# Patient Record
Sex: MALE | Race: WHITE | NOT HISPANIC OR LATINO | Employment: OTHER | URBAN - METROPOLITAN AREA
[De-identification: names, ages, dates, MRNs, and addresses within clinical notes are randomized per-mention and may not be internally consistent; named-entity substitution may affect disease eponyms.]

---

## 2018-04-13 ENCOUNTER — HOSPITAL ENCOUNTER (INPATIENT)
Facility: HOSPITAL | Age: 63
LOS: 4 days | Discharge: HOME | End: 2018-04-17
Attending: EMERGENCY MEDICINE | Admitting: INTERNAL MEDICINE

## 2018-04-13 ENCOUNTER — APPOINTMENT (OUTPATIENT)
Dept: CT IMAGING | Facility: HOSPITAL | Age: 63
End: 2018-04-13

## 2018-04-13 ENCOUNTER — APPOINTMENT (OUTPATIENT)
Dept: MRI IMAGING | Facility: HOSPITAL | Age: 63
End: 2018-04-13

## 2018-04-13 ENCOUNTER — APPOINTMENT (OUTPATIENT)
Dept: GENERAL RADIOLOGY | Facility: HOSPITAL | Age: 63
End: 2018-04-13

## 2018-04-13 ENCOUNTER — APPOINTMENT (OUTPATIENT)
Dept: CARDIOLOGY | Facility: HOSPITAL | Age: 63
End: 2018-04-13
Attending: INTERNAL MEDICINE

## 2018-04-13 DIAGNOSIS — R13.12 OROPHARYNGEAL DYSPHAGIA: ICD-10-CM

## 2018-04-13 DIAGNOSIS — G45.9 TRANSIENT CEREBRAL ISCHEMIA, UNSPECIFIED TYPE: ICD-10-CM

## 2018-04-13 DIAGNOSIS — R07.9 EXERTIONAL CHEST PAIN: Primary | ICD-10-CM

## 2018-04-13 PROBLEM — R73.9 HYPERGLYCEMIA: Status: ACTIVE | Noted: 2018-04-13

## 2018-04-13 PROBLEM — E78.5 HLD (HYPERLIPIDEMIA): Status: ACTIVE | Noted: 2018-04-13

## 2018-04-13 PROBLEM — I16.1 HYPERTENSIVE EMERGENCY: Status: ACTIVE | Noted: 2018-04-13

## 2018-04-13 PROBLEM — I10 ESSENTIAL HYPERTENSION: Status: ACTIVE | Noted: 2018-04-13

## 2018-04-13 LAB
ALBUMIN SERPL-MCNC: 3.7 G/DL (ref 3.5–5.2)
ALBUMIN/GLOB SERPL: 1.3 G/DL
ALP SERPL-CCNC: 155 U/L (ref 39–117)
ALT SERPL W P-5'-P-CCNC: 20 U/L (ref 1–41)
ANION GAP SERPL CALCULATED.3IONS-SCNC: 10.9 MMOL/L
AORTIC DIMENSIONLESS INDEX: 0.7 (DI)
ASCENDING AORTA: 3 CM
AST SERPL-CCNC: 17 U/L (ref 1–40)
BASOPHILS # BLD AUTO: 0.03 10*3/MM3 (ref 0–0.2)
BASOPHILS NFR BLD AUTO: 0.4 % (ref 0–1.5)
BH CV ECHO MEAS - ACS: 2 CM
BH CV ECHO MEAS - AO MEAN PG (FULL): 2 MMHG
BH CV ECHO MEAS - AO MEAN PG: 4 MMHG
BH CV ECHO MEAS - AO ROOT AREA (BSA CORRECTED): 1.6
BH CV ECHO MEAS - AO ROOT AREA: 8.6 CM^2
BH CV ECHO MEAS - AO ROOT DIAM: 3.3 CM
BH CV ECHO MEAS - AO V2 MAX: 125 CM/SEC
BH CV ECHO MEAS - AO V2 MEAN: 90.8 CM/SEC
BH CV ECHO MEAS - AO V2 VTI: 22.3 CM
BH CV ECHO MEAS - ASC AORTA: 3 CM
BH CV ECHO MEAS - AVA(I,A): 2.3 CM^2
BH CV ECHO MEAS - AVA(I,D): 2.3 CM^2
BH CV ECHO MEAS - BSA(HAYCOCK): 2.2 M^2
BH CV ECHO MEAS - BSA: 2.1 M^2
BH CV ECHO MEAS - BZI_BMI: 33.8 KILOGRAMS/M^2
BH CV ECHO MEAS - BZI_METRIC_HEIGHT: 170.2 CM
BH CV ECHO MEAS - BZI_METRIC_WEIGHT: 98 KG
BH CV ECHO MEAS - CONTRAST EF (2CH): 55.9 ML/M^2
BH CV ECHO MEAS - CONTRAST EF 4CH: 57.1 ML/M^2
BH CV ECHO MEAS - EDV(CUBED): 97.3 ML
BH CV ECHO MEAS - EDV(MOD-SP2): 59 ML
BH CV ECHO MEAS - EDV(MOD-SP4): 70 ML
BH CV ECHO MEAS - EDV(TEICH): 97.3 ML
BH CV ECHO MEAS - EF(CUBED): 69.4 %
BH CV ECHO MEAS - EF(MOD-SP2): 55.9 %
BH CV ECHO MEAS - EF(MOD-SP4): 57.1 %
BH CV ECHO MEAS - EF(TEICH): 61 %
BH CV ECHO MEAS - ESV(CUBED): 29.8 ML
BH CV ECHO MEAS - ESV(MOD-SP2): 26 ML
BH CV ECHO MEAS - ESV(MOD-SP4): 30 ML
BH CV ECHO MEAS - ESV(TEICH): 37.9 ML
BH CV ECHO MEAS - FS: 32.6 %
BH CV ECHO MEAS - IVS/LVPW: 0.92
BH CV ECHO MEAS - IVSD: 1.1 CM
BH CV ECHO MEAS - LAT PEAK E' VEL: 6 CM/SEC
BH CV ECHO MEAS - LV DIASTOLIC VOL/BSA (35-75): 33.5 ML/M^2
BH CV ECHO MEAS - LV MASS(C)D: 192.9 GRAMS
BH CV ECHO MEAS - LV MASS(C)DI: 92.3 GRAMS/M^2
BH CV ECHO MEAS - LV MEAN PG: 2 MMHG
BH CV ECHO MEAS - LV SYSTOLIC VOL/BSA (12-30): 14.4 ML/M^2
BH CV ECHO MEAS - LV V1 MAX: 79 CM/SEC
BH CV ECHO MEAS - LV V1 MEAN: 64.8 CM/SEC
BH CV ECHO MEAS - LV V1 VTI: 14.8 CM
BH CV ECHO MEAS - LVIDD: 4.6 CM
BH CV ECHO MEAS - LVIDS: 3.1 CM
BH CV ECHO MEAS - LVLD AP2: 6.7 CM
BH CV ECHO MEAS - LVLD AP4: 7.6 CM
BH CV ECHO MEAS - LVLS AP2: 5.8 CM
BH CV ECHO MEAS - LVLS AP4: 5.8 CM
BH CV ECHO MEAS - LVOT AREA (M): 3.5 CM^2
BH CV ECHO MEAS - LVOT AREA: 3.5 CM^2
BH CV ECHO MEAS - LVOT DIAM: 2.1 CM
BH CV ECHO MEAS - LVPWD: 1.2 CM
BH CV ECHO MEAS - MED PEAK E' VEL: 6 CM/SEC
BH CV ECHO MEAS - MV A DUR: 0.1 SEC
BH CV ECHO MEAS - MV A MAX VEL: 97.7 CM/SEC
BH CV ECHO MEAS - MV DEC SLOPE: 389 CM/SEC^2
BH CV ECHO MEAS - MV DEC TIME: 0.16 SEC
BH CV ECHO MEAS - MV E MAX VEL: 77.5 CM/SEC
BH CV ECHO MEAS - MV E/A: 0.79
BH CV ECHO MEAS - MV MEAN PG: 2 MMHG
BH CV ECHO MEAS - MV P1/2T MAX VEL: 82.1 CM/SEC
BH CV ECHO MEAS - MV P1/2T: 61.8 MSEC
BH CV ECHO MEAS - MV V2 MEAN: 70.2 CM/SEC
BH CV ECHO MEAS - MV V2 VTI: 27.9 CM
BH CV ECHO MEAS - MVA P1/2T LCG: 2.7 CM^2
BH CV ECHO MEAS - MVA(P1/2T): 3.6 CM^2
BH CV ECHO MEAS - MVA(VTI): 1.8 CM^2
BH CV ECHO MEAS - PA ACC SLOPE: 42.8 CM/SEC^2
BH CV ECHO MEAS - PA ACC TIME: 0.06 SEC
BH CV ECHO MEAS - PA MAX PG (FULL): 3 MMHG
BH CV ECHO MEAS - PA MAX PG: 5.1 MMHG
BH CV ECHO MEAS - PA PR(ACCEL): 50.7 MMHG
BH CV ECHO MEAS - PA V2 MAX: 113 CM/SEC
BH CV ECHO MEAS - PULM A REVS DUR: 0.1 SEC
BH CV ECHO MEAS - PULM A REVS VEL: 32 CM/SEC
BH CV ECHO MEAS - PULM DIAS VEL: 40.7 CM/SEC
BH CV ECHO MEAS - PULM S/D: 1.5
BH CV ECHO MEAS - PULM SYS VEL: 60.2 CM/SEC
BH CV ECHO MEAS - PVA(V,A): 3.4 CM^2
BH CV ECHO MEAS - PVA(V,D): 3.4 CM^2
BH CV ECHO MEAS - QP/QS: 1.6
BH CV ECHO MEAS - RV MAX PG: 2.1 MMHG
BH CV ECHO MEAS - RV MEAN PG: 1 MMHG
BH CV ECHO MEAS - RV V1 MAX: 72.9 CM/SEC
BH CV ECHO MEAS - RV V1 MEAN: 54.3 CM/SEC
BH CV ECHO MEAS - RV V1 VTI: 15.2 CM
BH CV ECHO MEAS - RVOT AREA: 5.3 CM^2
BH CV ECHO MEAS - RVOT DIAM: 2.6 CM
BH CV ECHO MEAS - SI(AO): 91.3 ML/M^2
BH CV ECHO MEAS - SI(CUBED): 32.3 ML/M^2
BH CV ECHO MEAS - SI(LVOT): 24.5 ML/M^2
BH CV ECHO MEAS - SI(MOD-SP2): 15.8 ML/M^2
BH CV ECHO MEAS - SI(MOD-SP4): 19.1 ML/M^2
BH CV ECHO MEAS - SI(TEICH): 28.4 ML/M^2
BH CV ECHO MEAS - SUP REN AO DIAM: 2.25 CM
BH CV ECHO MEAS - SV(AO): 190.7 ML
BH CV ECHO MEAS - SV(CUBED): 67.5 ML
BH CV ECHO MEAS - SV(LVOT): 51.3 ML
BH CV ECHO MEAS - SV(MOD-SP2): 33 ML
BH CV ECHO MEAS - SV(MOD-SP4): 40 ML
BH CV ECHO MEAS - SV(RVOT): 80.7 ML
BH CV ECHO MEAS - SV(TEICH): 59.4 ML
BH CV ECHO MEAS - TAPSE (>1.6): 2.5 CM2
BH CV VAS BP RIGHT ARM: NORMAL MMHG
BH CV XLRA - RV BASE: 3 CM
BH CV XLRA - TDI S': 15.4 CM/SEC
BILIRUB SERPL-MCNC: 0.3 MG/DL (ref 0.1–1.2)
BUN BLD-MCNC: 13 MG/DL (ref 8–23)
BUN/CREAT SERPL: 23.2 (ref 7–25)
CALCIUM SPEC-SCNC: 8.5 MG/DL (ref 8.6–10.5)
CHLORIDE SERPL-SCNC: 101 MMOL/L (ref 98–107)
CO2 SERPL-SCNC: 25.1 MMOL/L (ref 22–29)
CREAT BLD-MCNC: 0.56 MG/DL (ref 0.76–1.27)
DEPRECATED RDW RBC AUTO: 45.4 FL (ref 37–54)
E/E' RATIO: 12
EOSINOPHIL # BLD AUTO: 0.3 10*3/MM3 (ref 0–0.7)
EOSINOPHIL NFR BLD AUTO: 4.4 % (ref 0.3–6.2)
ERYTHROCYTE [DISTWIDTH] IN BLOOD BY AUTOMATED COUNT: 13.6 % (ref 11.5–14.5)
GFR SERPL CREATININE-BSD FRML MDRD: 148 ML/MIN/1.73
GLOBULIN UR ELPH-MCNC: 2.9 GM/DL
GLUCOSE BLD-MCNC: 292 MG/DL (ref 65–99)
GLUCOSE BLDC GLUCOMTR-MCNC: 295 MG/DL (ref 70–130)
HCT VFR BLD AUTO: 50.8 % (ref 40.4–52.2)
HGB BLD-MCNC: 16.6 G/DL (ref 13.7–17.6)
IMM GRANULOCYTES # BLD: 0.03 10*3/MM3 (ref 0–0.03)
IMM GRANULOCYTES NFR BLD: 0.4 % (ref 0–0.5)
LEFT ATRIUM VOLUME INDEX: 17 ML/M2
LV EF 2D ECHO EST: 57 %
LYMPHOCYTES # BLD AUTO: 2.56 10*3/MM3 (ref 0.9–4.8)
LYMPHOCYTES NFR BLD AUTO: 37.9 % (ref 19.6–45.3)
MAXIMAL PREDICTED HEART RATE: 158 BPM
MCH RBC QN AUTO: 30.4 PG (ref 27–32.7)
MCHC RBC AUTO-ENTMCNC: 32.7 G/DL (ref 32.6–36.4)
MCV RBC AUTO: 93 FL (ref 79.8–96.2)
MONOCYTES # BLD AUTO: 0.6 10*3/MM3 (ref 0.2–1.2)
MONOCYTES NFR BLD AUTO: 8.9 % (ref 5–12)
NEUTROPHILS # BLD AUTO: 3.23 10*3/MM3 (ref 1.9–8.1)
NEUTROPHILS NFR BLD AUTO: 48 % (ref 42.7–76)
NT-PROBNP SERPL-MCNC: 31.8 PG/ML (ref 5–900)
PLATELET # BLD AUTO: 149 10*3/MM3 (ref 140–500)
PMV BLD AUTO: 11.6 FL (ref 6–12)
POTASSIUM BLD-SCNC: 3.9 MMOL/L (ref 3.5–5.2)
PROT SERPL-MCNC: 6.6 G/DL (ref 6–8.5)
RBC # BLD AUTO: 5.46 10*6/MM3 (ref 4.6–6)
SODIUM BLD-SCNC: 137 MMOL/L (ref 136–145)
STRESS TARGET HR: 134 BPM
TROPONIN T SERPL-MCNC: <0.01 NG/ML (ref 0–0.03)
TROPONIN T SERPL-MCNC: <0.01 NG/ML (ref 0–0.03)
WBC NRBC COR # BLD: 6.75 10*3/MM3 (ref 4.5–10.7)

## 2018-04-13 PROCEDURE — 93306 TTE W/DOPPLER COMPLETE: CPT

## 2018-04-13 PROCEDURE — 99254 IP/OBS CNSLTJ NEW/EST MOD 60: CPT | Performed by: INTERNAL MEDICINE

## 2018-04-13 PROCEDURE — 83880 ASSAY OF NATRIURETIC PEPTIDE: CPT | Performed by: PHYSICIAN ASSISTANT

## 2018-04-13 PROCEDURE — 85025 COMPLETE CBC W/AUTO DIFF WBC: CPT | Performed by: PHYSICIAN ASSISTANT

## 2018-04-13 PROCEDURE — 93005 ELECTROCARDIOGRAM TRACING: CPT | Performed by: PHYSICIAN ASSISTANT

## 2018-04-13 PROCEDURE — 70551 MRI BRAIN STEM W/O DYE: CPT

## 2018-04-13 PROCEDURE — 82962 GLUCOSE BLOOD TEST: CPT

## 2018-04-13 PROCEDURE — 80053 COMPREHEN METABOLIC PANEL: CPT | Performed by: PHYSICIAN ASSISTANT

## 2018-04-13 PROCEDURE — 84484 ASSAY OF TROPONIN QUANT: CPT | Performed by: PHYSICIAN ASSISTANT

## 2018-04-13 PROCEDURE — 70450 CT HEAD/BRAIN W/O DYE: CPT

## 2018-04-13 PROCEDURE — 99285 EMERGENCY DEPT VISIT HI MDM: CPT

## 2018-04-13 PROCEDURE — 84484 ASSAY OF TROPONIN QUANT: CPT | Performed by: INTERNAL MEDICINE

## 2018-04-13 PROCEDURE — 93306 TTE W/DOPPLER COMPLETE: CPT | Performed by: INTERNAL MEDICINE

## 2018-04-13 PROCEDURE — 71046 X-RAY EXAM CHEST 2 VIEWS: CPT

## 2018-04-13 PROCEDURE — 93010 ELECTROCARDIOGRAM REPORT: CPT | Performed by: INTERNAL MEDICINE

## 2018-04-13 PROCEDURE — 25010000002 LORAZEPAM PER 2 MG: Performed by: INTERNAL MEDICINE

## 2018-04-13 RX ORDER — SODIUM CHLORIDE 0.9 % (FLUSH) 0.9 %
1-10 SYRINGE (ML) INJECTION AS NEEDED
Status: DISCONTINUED | OUTPATIENT
Start: 2018-04-13 | End: 2018-04-17 | Stop reason: HOSPADM

## 2018-04-13 RX ORDER — ASPIRIN 325 MG
325 TABLET ORAL ONCE
Status: COMPLETED | OUTPATIENT
Start: 2018-04-13 | End: 2018-04-13

## 2018-04-13 RX ORDER — HYDROCODONE BITARTRATE AND ACETAMINOPHEN 5; 325 MG/1; MG/1
1 TABLET ORAL EVERY 4 HOURS PRN
Status: DISCONTINUED | OUTPATIENT
Start: 2018-04-13 | End: 2018-04-17 | Stop reason: HOSPADM

## 2018-04-13 RX ORDER — ASPIRIN 325 MG
325 TABLET ORAL DAILY
Status: DISCONTINUED | OUTPATIENT
Start: 2018-04-13 | End: 2018-04-14

## 2018-04-13 RX ORDER — ONDANSETRON 4 MG/1
4 TABLET, ORALLY DISINTEGRATING ORAL EVERY 6 HOURS PRN
Status: DISCONTINUED | OUTPATIENT
Start: 2018-04-13 | End: 2018-04-17 | Stop reason: HOSPADM

## 2018-04-13 RX ORDER — DOCUSATE SODIUM 100 MG/1
100 CAPSULE, LIQUID FILLED ORAL 2 TIMES DAILY PRN
Status: DISCONTINUED | OUTPATIENT
Start: 2018-04-13 | End: 2018-04-17 | Stop reason: HOSPADM

## 2018-04-13 RX ORDER — ONDANSETRON 2 MG/ML
4 INJECTION INTRAMUSCULAR; INTRAVENOUS EVERY 6 HOURS PRN
Status: DISCONTINUED | OUTPATIENT
Start: 2018-04-13 | End: 2018-04-17 | Stop reason: HOSPADM

## 2018-04-13 RX ORDER — LORAZEPAM 2 MG/ML
1 INJECTION INTRAMUSCULAR
Status: COMPLETED | OUTPATIENT
Start: 2018-04-13 | End: 2018-04-13

## 2018-04-13 RX ORDER — ATORVASTATIN CALCIUM 80 MG/1
80 TABLET, FILM COATED ORAL NIGHTLY
Status: DISCONTINUED | OUTPATIENT
Start: 2018-04-13 | End: 2018-04-17 | Stop reason: HOSPADM

## 2018-04-13 RX ORDER — NITROGLYCERIN 0.4 MG/1
0.4 TABLET SUBLINGUAL
Status: DISCONTINUED | OUTPATIENT
Start: 2018-04-13 | End: 2018-04-17 | Stop reason: HOSPADM

## 2018-04-13 RX ORDER — DIPHENHYDRAMINE HCL 25 MG
25 CAPSULE ORAL NIGHTLY PRN
COMMUNITY
End: 2022-10-28 | Stop reason: HOSPADM

## 2018-04-13 RX ORDER — ONDANSETRON 4 MG/1
4 TABLET, FILM COATED ORAL EVERY 6 HOURS PRN
Status: DISCONTINUED | OUTPATIENT
Start: 2018-04-13 | End: 2018-04-17 | Stop reason: HOSPADM

## 2018-04-13 RX ORDER — LABETALOL HYDROCHLORIDE 5 MG/ML
20 INJECTION, SOLUTION INTRAVENOUS EVERY 6 HOURS PRN
Status: DISCONTINUED | OUTPATIENT
Start: 2018-04-13 | End: 2018-04-14

## 2018-04-13 RX ORDER — ASPIRIN 300 MG/1
300 SUPPOSITORY RECTAL DAILY
Status: DISCONTINUED | OUTPATIENT
Start: 2018-04-13 | End: 2018-04-14

## 2018-04-13 RX ORDER — ACETAMINOPHEN 325 MG/1
650 TABLET ORAL EVERY 4 HOURS PRN
Status: DISCONTINUED | OUTPATIENT
Start: 2018-04-13 | End: 2018-04-17 | Stop reason: HOSPADM

## 2018-04-13 RX ORDER — BISACODYL 10 MG
10 SUPPOSITORY, RECTAL RECTAL DAILY PRN
Status: DISCONTINUED | OUTPATIENT
Start: 2018-04-13 | End: 2018-04-17 | Stop reason: HOSPADM

## 2018-04-13 RX ORDER — DEXTROSE MONOHYDRATE 25 G/50ML
25 INJECTION, SOLUTION INTRAVENOUS
Status: DISCONTINUED | OUTPATIENT
Start: 2018-04-13 | End: 2018-04-17 | Stop reason: HOSPADM

## 2018-04-13 RX ORDER — NICOTINE POLACRILEX 4 MG
15 LOZENGE BUCCAL
Status: DISCONTINUED | OUTPATIENT
Start: 2018-04-13 | End: 2018-04-17 | Stop reason: HOSPADM

## 2018-04-13 RX ORDER — ASPIRIN 325 MG
325 TABLET ORAL DAILY
COMMUNITY
End: 2018-04-17 | Stop reason: HOSPADM

## 2018-04-13 RX ADMIN — LORAZEPAM 1 MG: 2 INJECTION INTRAMUSCULAR; INTRAVENOUS at 16:20

## 2018-04-13 RX ADMIN — LORAZEPAM 1 MG: 2 INJECTION INTRAMUSCULAR; INTRAVENOUS at 17:10

## 2018-04-13 RX ADMIN — ASPIRIN 325 MG: 325 TABLET ORAL at 12:50

## 2018-04-13 RX ADMIN — METOPROLOL TARTRATE 25 MG: 25 TABLET ORAL at 15:30

## 2018-04-13 RX ADMIN — ATORVASTATIN CALCIUM 80 MG: 80 TABLET, FILM COATED ORAL at 23:38

## 2018-04-13 RX ADMIN — METOPROLOL TARTRATE 25 MG: 25 TABLET ORAL at 23:38

## 2018-04-13 NOTE — ED PROVIDER NOTES
"EMERGENCY DEPARTMENT ENCOUNTER    Room Number:  37/37  Date seen:  4/13/2018  Time seen: 10:41 AM  PCP: No Known Provider  Historian: Patient      HPI:  Chief Complaint: Multiple Complaints  Context: Matt Cruz is a 62 y.o. male with h/o CAD for which pt had coronary angioplasty with stent placement about 9 years ago. Pt reports that he has not seen a physician for about 8 years \"because I've had other things going on\"; pt reports, \"I've been told I'm hypertensive, but I haven't taken any medications for years\". Pt reports that for the past several months, pt has had intermittent episodes of lightheadedness and chest pain with exertion. Pt states that last week, pt had an episode of expressive aphasia with right facial numbness and right arm numbness that lasted about 5 minutes and resolved without recurrence. Pt was seen at a new PMD's office earlier today for all of his symptoms; while there, pt's blood pressure was noted to be 220/90. Consequently, pt was referred to the ER for further evaluation. Pt currently denies focal weakness, visual difficulties, headache, palpitations, dyspnea, diaphoresis, abdominal pain, and N/V/D. There are no other complaints at this time.     Onset: Gradual in onset  Location: Generalized body  Radiation: N/A  Duration: Neurological symptoms occurred last week; chest pain/lightheadedness began several months ago  Timing: Intermittent  Character: N/A  Aggravating Factors: Chest pain and lightheadedness worsen with exertion  Alleviating Factors: Chest pain and lightheadedness improve with rest  Severity: Moderate         MEDICAL RECORD REVIEW    Pt has had no recent visits to [facility] ED.           ALLERGIES  Review of patient's allergies indicates no known allergies.    PAST MEDICAL HISTORY  Active Ambulatory Problems     Diagnosis Date Noted   • No Active Ambulatory Problems     Resolved Ambulatory Problems     Diagnosis Date Noted   • No Resolved Ambulatory Problems     Past Medical " History:   Diagnosis Date   • Coronary artery disease    • Hyperlipidemia    • Hypertension    • Kidney stone        PAST SURGICAL HISTORY  Past Surgical History:   Procedure Laterality Date   • CORONARY ANGIOPLASTY WITH STENT PLACEMENT     • KIDNEY STONE SURGERY         FAMILY HISTORY  History reviewed. No pertinent family history.    SOCIAL HISTORY  Social History     Social History   • Marital status:      Spouse name: N/A   • Number of children: N/A   • Years of education: N/A     Occupational History   • Not on file.     Social History Main Topics   • Smoking status: Former Smoker   • Smokeless tobacco: Not on file   • Alcohol use No   • Drug use: No   • Sexual activity: Defer     Other Topics Concern   • Not on file     Social History Narrative   • No narrative on file           REVIEW OF SYSTEMS  Review of Systems   Constitutional: Negative for chills and diaphoresis.   HENT: Negative for congestion, rhinorrhea and sore throat.    Eyes: Negative for pain and visual disturbance.   Respiratory: Negative for cough and shortness of breath.    Cardiovascular: Positive for chest pain. Negative for palpitations and leg swelling.   Gastrointestinal: Negative for abdominal pain, diarrhea, nausea and vomiting.   Genitourinary: Negative for difficulty urinating, dysuria, flank pain and frequency.   Musculoskeletal: Negative for myalgias, neck pain and neck stiffness.   Skin: Negative for rash.   Neurological: Positive for speech difficulty (now resolved), light-headedness and numbness (right face and right arm - now resolved). Negative for weakness and headaches.   Psychiatric/Behavioral: Negative.    All other systems reviewed and are negative.          PHYSICAL EXAM  ED Triage Vitals [04/13/18 1020]   Temp Heart Rate Resp BP SpO2   98.5 °F (36.9 °C) 76 16 170/87 98 %      Temp src Heart Rate Source Patient Position BP Location FiO2 (%)   Oral -- -- -- --     Physical Exam   Constitutional: He is oriented to  person, place, and time. No distress.   HENT:   Head: Normocephalic.   Mouth/Throat: Mucous membranes are normal.   Eyes: EOM are normal. Pupils are equal, round, and reactive to light.   Neck: Normal range of motion. Neck supple.   Cardiovascular: Normal rate, regular rhythm and normal heart sounds.    Pulmonary/Chest: Effort normal and breath sounds normal. No respiratory distress. He has no decreased breath sounds. He has no wheezes. He has no rhonchi. He has no rales.   Abdominal:   Soft  Nontender  Nondistended     Musculoskeletal: Normal range of motion.   Neurological: He is alert and oriented to person, place, and time. He has normal sensation.   GCS is 15  Cranial nerves II-XII are intact.  No facial droop. Uvula is midline. No tongue deviation. PERRL and EOMI. There is no dysarthria, aphasia, or slurred speech.  Sensation is intact to light touch bilaterally and is symmetrical in the face, BUEs, and BLEs.  Strength is 5/5 and symmetrical in the BUEs and BLEs.  Finger to nose, heel to shin, and rapid alternating movements are intact.     Skin: Skin is warm and dry.   There are widespread psoriatic plaques to the extremities.    Psychiatric: Mood and affect normal.   Nursing note and vitals reviewed.          LAB RESULTS  Recent Results (from the past 24 hour(s))   Comprehensive Metabolic Panel    Collection Time: 04/13/18 11:22 AM   Result Value Ref Range    Glucose 292 (H) 65 - 99 mg/dL    BUN 13 8 - 23 mg/dL    Creatinine 0.56 (L) 0.76 - 1.27 mg/dL    Sodium 137 136 - 145 mmol/L    Potassium 3.9 3.5 - 5.2 mmol/L    Chloride 101 98 - 107 mmol/L    CO2 25.1 22.0 - 29.0 mmol/L    Calcium 8.5 (L) 8.6 - 10.5 mg/dL    Total Protein 6.6 6.0 - 8.5 g/dL    Albumin 3.70 3.50 - 5.20 g/dL    ALT (SGPT) 20 1 - 41 U/L    AST (SGOT) 17 1 - 40 U/L    Alkaline Phosphatase 155 (H) 39 - 117 U/L    Total Bilirubin 0.3 0.1 - 1.2 mg/dL    eGFR Non African Amer 148 >60 mL/min/1.73    Globulin 2.9 gm/dL    A/G Ratio 1.3 g/dL     BUN/Creatinine Ratio 23.2 7.0 - 25.0    Anion Gap 10.9 mmol/L   Troponin    Collection Time: 04/13/18 11:22 AM   Result Value Ref Range    Troponin T <0.010 0.000 - 0.030 ng/mL   CBC Auto Differential    Collection Time: 04/13/18 11:22 AM   Result Value Ref Range    WBC 6.75 4.50 - 10.70 10*3/mm3    RBC 5.46 4.60 - 6.00 10*6/mm3    Hemoglobin 16.6 13.7 - 17.6 g/dL    Hematocrit 50.8 40.4 - 52.2 %    MCV 93.0 79.8 - 96.2 fL    MCH 30.4 27.0 - 32.7 pg    MCHC 32.7 32.6 - 36.4 g/dL    RDW 13.6 11.5 - 14.5 %    RDW-SD 45.4 37.0 - 54.0 fl    MPV 11.6 6.0 - 12.0 fL    Platelets 149 140 - 500 10*3/mm3    Neutrophil % 48.0 42.7 - 76.0 %    Lymphocyte % 37.9 19.6 - 45.3 %    Monocyte % 8.9 5.0 - 12.0 %    Eosinophil % 4.4 0.3 - 6.2 %    Basophil % 0.4 0.0 - 1.5 %    Immature Grans % 0.4 0.0 - 0.5 %    Neutrophils, Absolute 3.23 1.90 - 8.10 10*3/mm3    Lymphocytes, Absolute 2.56 0.90 - 4.80 10*3/mm3    Monocytes, Absolute 0.60 0.20 - 1.20 10*3/mm3    Eosinophils, Absolute 0.30 0.00 - 0.70 10*3/mm3    Basophils, Absolute 0.03 0.00 - 0.20 10*3/mm3    Immature Grans, Absolute 0.03 0.00 - 0.03 10*3/mm3   BNP    Collection Time: 04/13/18 11:22 AM   Result Value Ref Range    proBNP 31.8 5.0 - 900.0 pg/mL       I ordered the above labs and reviewed the results.        RADIOLOGY  CT Head Without Contrast   Preliminary Result       No evidence for acute intracranial pathology. If there remains clinical   concern for a CT occult infarct, further evaluation could be performed   with MR imaging.       These findings and recommendations were directly discussed with Patsy Shay of the emergency room staff on 04/13/2018 at approximately 12:19   PM.       Radiation dose reduction techniques were utilized, including automated   exposure control and exposure modulation based on body size.          XR Chest 2 View   Final Result    The lungs are somewhat poorly inflated. There is mild bibasilar  atelectasis. There are no acute focal  "infiltrates. There are no pleural  effusions. A few calcified granulomas are noted. The heart is normal in  size.     IMPRESSIONS: No evidence of acute disease within the chest.          I ordered the above noted radiological studies and reviewed the images on the PACS system.  Spoke with Dr. Carnes (radiologist) regarding CT Head scan results.        MEDICATIONS GIVEN IN ER  Medications   aspirin tablet 325 mg (325 mg Oral Given 4/13/18 1250)           EKG  Interpreted by Dr. Georges (ER physician). Please see his documentation for the interpretation.         PROCEDURES  Procedures          PROGRESS AND CONSULTS    Progress Notes:    ED Course     11:06 AM:  Pt is not a candidate for tPA as pt's NIHSS is 0; pt's neurological symptoms occurred last week and have now resolved.     Blood work, EKG, CXR, and CT Head ordered for further evaluation.     12:16 PM:  Reviewed pt's history and workup with Dr. Georges (ER physician).  After a bedside evaluation, Dr. Georges agrees with the plan of care.    12:31 PM:  Pt's blood glucose is 292. Troponin is negative. CXR is negative acute. CT Head is negative acute. Placed call to [facility] for admission for further evaluation and management of pt's symptoms. Decision time to admit: Now.    12:57 PM:  Placed call to the cardiologist to discuss further course of care. BNP ordered for further evaluation.     1:07 PM:  Discussed case with Dr. Aguilar, hospitalist. He will admit pt to a telemetry bed.     1:26 PM:  Case d/w DIPTI Aguilar for [facility]; their group will consult during the admission. Pt will receive ASA in the ER.           Disposition vitals:  /94   Pulse 71   Temp 98.5 °F (36.9 °C) (Oral)   Resp 20   Ht 170.2 cm (67\")   Wt 97.5 kg (215 lb)   SpO2 95%   BMI 33.67 kg/m²           DIAGNOSIS  Final diagnoses:   Exertional chest pain   Transient cerebral ischemia, unspecified type           DISPOSITION  Pt admitted to " telemetry.      ADMISSION    Discussed treatment plan and reason for admission with admitting physician.              Documentation assistance provided by mee Brown for Ms. Patsy Shay PA-C.  Information recorded by the scribsonya was done at my direction and has been verified and validated by me.              Shellie Brown  04/13/18 [id number]       JANNIE Sin  04/15/18 6699

## 2018-04-13 NOTE — CONSULTS
Patient Name: Matt Cruz  :1955  62 y.o.    Date of Admission: 2018  Encounter Provider: Ashley Alba MD  Date of Encounter Visit: 18  Place of Service: [facility] CARDIOLOGY  Referring Provider: Jaden Aguilar MD  Patient Care Team:  No Known Provider as PCP - General      Chief complaint: HTN and recent stroke-like symptoms    Reason for consult: hypertension    History of Present Illness:  Mr. Cruz is 62 years old, with history of CAD s/p stent to mid LAD in . He was sent to ED from PCP's office for possible stroke symptoms last week. These included expressive aphasia, right facial numbness and right arm numbness; these resolved after 5 minutes. Today, in PCP's office, /90; he's had no further recurrence of stroke like symptoms. Upon arrival to ED today, /87 with HR 76; EKG shows NSR. CT head was negative for acute disease. We have been asked to see for hypertension and chest pain. Labs: troponin <0.01, proBNP 31.8, BUN 13, Crea 0.56, WBC 6.75, Hgb 16.6, Hct 50.8. plt 149.    Comes he comes to the emergency room because he started having symptoms of expressive aphasia and right facial numbness.  He is being evaluated for stroke.  He was found to be markedly height her intensive at his primary care provider's office and was sent to the emergency room.  He has a history of hypertension which has not been well-controlled.  He does not exercise on a regular basis.  When he tries to exert himself he started getting discomfort in his chest which he describes as a pressure that does not radiate.  It's associated with shortness of breath.  It feels just like the pain he had prior to his stents in the past.  He is currently pain-free.    Cardiac catheterization was in  which showed three vessel disease (90% stenosis mid LAD, RCA occlusion, and 30% stenosis of LCX) with EF 50-55%. Angioplasty and KD of the mid LAD was performed, however,  angioplasty was unsuccessful of RCA. Medical management was recommended. He is currently not taking any prescribed medications. He has not followed up with a cardiology for several years.    Cardiac Testing:  Cardiac Catheterization 2010      Imaging:  CXR 18  IMPRESSIONS: No evidence of acute disease within the chest.    CT head 18  IMPRESSION:  No evidence for acute intracranial pathology. If there remains clinical  concern for a CT occult infarct, further evaluation could be performed  with MR imaging.    Past Medical History:   Diagnosis Date   • Coronary artery disease    • Hyperlipidemia    • Hypertension    • Kidney stone        Past Surgical History:   Procedure Laterality Date   • CORONARY ANGIOPLASTY WITH STENT PLACEMENT     • KIDNEY STONE SURGERY           Prior to Admission medications    Medication Sig Start Date End Date Taking? Authorizing Provider   aspirin 325 MG tablet Take 325 mg by mouth Daily.   Yes Historical Provider, MD   diphenhydrAMINE (BENADRYL) 25 mg capsule Take 25 mg by mouth At Night As Needed for Allergies.   Yes Historical Provider, MD       No Known Allergies    Social History     Social History   • Marital status:      Occupational History   • director      Social History Main Topics   • Smoking status: Former Smoker   • Alcohol use No   • Drug use: No   • Sexual activity: Defer     Other Topics Concern   • Not on file       Family History   Problem Relation Age of Onset   • Coronary artery disease Father 60   • Heart attack Brother 48            REVIEW OF SYSTEMS:   All other systems reviewed and negative.        Objective:     Vitals:    18 1118 18 1232 18 1333 18 1503   BP: (!) 185/96 162/94 171/92 (!) 178/103   Pulse: 72 71 72 72   Resp:  18 20   Temp:       TempSrc:       SpO2: 94% 95% 96% 95%   Weight:       Height:         Body mass index is 33.67 kg/m².  No intake or output data in the 24 hours ending 18  1545    Constitutional: He is oriented to person, place, and time. He appears well-developed. He does not appear ill.   HENT:   Head: Normocephalic and atraumatic. Head is without contusion.   Right Ear: Hearing normal. No drainage.   Left Ear: Hearing normal. No drainage.   Nose: No nasal deformity. No epistaxis.   Eyes: Lids are normal. Right eye exhibits no exudate. Left eye exhibits no exudate.  Neck: No JVD present. Carotid bruit is not present. No tracheal deviation present. No thyroid mass and no thyromegaly present.   Cardiovascular: Normal rate, regular rhythm and normal heart sounds.    Pulses:       Posterior tibial pulses are 2+ on the right side, and 2+ on the left side.   Pulmonary/Chest: Effort normal and breath sounds normal.   Abdominal: Soft. Normal appearance and bowel sounds are normal. There is no tenderness.   Musculoskeletal: Normal range of motion.        Right shoulder: He exhibits no deformity.        Left shoulder: He exhibits no deformity.   Neurological: He is alert and oriented to person, place, and time. He has normal strength.   Skin: Skin is warm, dry and intact. No rash noted.   Psychiatric: He has a normal mood and affect. His behavior is normal. Thought content normal.   Vitals reviewed      Lab Review:       Results from last 7 days  Lab Units 04/13/18  1122   SODIUM mmol/L 137   POTASSIUM mmol/L 3.9   CHLORIDE mmol/L 101   CO2 mmol/L 25.1   BUN mg/dL 13   CREATININE mg/dL 0.56*   CALCIUM mg/dL 8.5*   BILIRUBIN mg/dL 0.3   ALK PHOS U/L 155*   ALT (SGPT) U/L 20   AST (SGOT) U/L 17   GLUCOSE mg/dL 292*       Results from last 7 days  Lab Units 04/13/18  1122   TROPONIN T ng/mL <0.010       Results from last 7 days  Lab Units 04/13/18  1122   WBC 10*3/mm3 6.75   HEMOGLOBIN g/dL 16.6   HEMATOCRIT % 50.8   PLATELETS 10*3/mm3 149                EKG:             I personally viewed and interpreted the patient's EKG/Telemetry data.        Assessment and Plan:       1.  Chest pain.  This  is very concerning for anginal chest pain.  I would like to spend the weekend getting this possible stroke evaluated, getting his blood pressure and blood sugars under control with a tentative plan of a heart catheterization on Monday.  2.  Hypertension.  3.  Hyperglycemia.  He denies a formal diagnosis of diabetes.  4.  Strokelike symptoms.  MRI and MRA are pending.    5.  Obesity  6.  Family history of premature coronary disease in his brother who  of a heart attack at a young age.    Ashley Alba MD  18  3:45 PM

## 2018-04-13 NOTE — PROGRESS NOTES
Clinical Pharmacy Services: Medication History    Matt Cruz is a 62 y.o. male presenting to [facility] for Exertional chest pain [R07.9]    He  has a past medical history of Coronary artery disease; Hyperlipidemia; Hypertension; and Kidney stone.    Allergies as of 04/13/2018   • (No Known Allergies)       Medication information was obtained from: Patient at bedside   Pharmacy and Phone Number: [facility] [phone]    Prior to Admission Medications     Prescriptions Last Dose Informant Patient Reported? Taking?    aspirin 325 MG tablet 4/12/2018 Self Yes Yes    Take 325 mg by mouth Daily.    diphenhydrAMINE (BENADRYL) 25 mg capsule Past Week Self Yes Yes    Take 25 mg by mouth At Night As Needed for Allergies.            Medication notes: N/A    This medication list is complete to the best of my knowledge as of 4/13/2018    Please call if questions.    Keesha Foster, Pharm.D. Candidate 2018  [phone]  4/13/2018 2:19 PM

## 2018-04-13 NOTE — ED TRIAGE NOTES
Pt had stent placed 9 years ago without much follow up.    Last week, pt had episode of slurred speech, aphasia, facial numbness, right sided weakness. Symptoms resolved after 5 minutes.    Pt was at PCP today and was hypertensive. Pt sent to ER for hypertension and TIA. No neuro symptoms today.

## 2018-04-13 NOTE — ED PROVIDER NOTES
"The ZAC and I have discussed this patient's history, physical exam, and treatment plan.  I have reviewed the documentation and personally had a face to face interaction with the patient. I affirm the documentation and agree with the treatment and plan.  The attached note describes my personal findings.    HPI:  Mtat Cruz is a 62 y.o. male with h/o CAD for which pt has had coronary angioplasty with stent placement about 9 years ago. Pt reports that he has not seen a physician for about 8 years \"because I've had other things going on\"; pt reports, \"I've been told I'm hypertensive, but I haven't taken any medications for years\". Pt reports that for the past several months, pt has had intermittent episodes of lightheadedness and chest pain with exertion. Pt states that last week, pt had an episode of expressive aphasia that lasted about 5 minutes and has now resolved. Pt was seen at his PMD's office earlier today for all of his symptoms; while there, pt's blood pressure was noted to be 220/90. Consequently, pt was referred to the ER for further evaluation. Pt denies focal weakness/numbness, visual difficulties, headache, palpitations, dyspnea, diaphoresis, abdominal pain, and N/V/D. There are no other complaints at this time. Pt ambulates independently. Pt denies tobacco and     Per pt's spouse, pt's speech seems baseline but pt appears fatigued.     PEx:  On exam, A and O X3, intact distal pulses, heart RRR, lungs CTAB, nml speeach, no pronator dirft, abd is obese soft and non-tender, no motor os sensory changes.  The patient currently denies any chest pain, shortness of breath or any neurologic complaints.    Progress and Consults:  BP- (!) 185/96 HR- 72 Temp- 98.5 °F (36.9 °C) (Oral) O2 sat- 94%  Notified pt that he may of had a TIA. Notified pt of plan to admit to medicine for further assessment. Pt understands and agrees with the plan, all questions answered.    Procedures:  EKG           EKG time: " 1112  Rhythm/Rate: 75  P waves and NJ: narrow complex  QRS, axis: nml   ST and T waves: ST depression in anterior leads 2,3,F, no ST elevation  Nml QT    Interpreted Contemporaneously by me, independently viewed  Changed compared to prior 10/2010.    Documentation assistance provided by mee Rocha for Leo Georges MD.  Information recorded by the scribe was done at my direction and has been verified and validated by me.       Blanca Rocha  04/13/18 1253       Leo Georges MD  04/13/18 9570

## 2018-04-13 NOTE — ED NOTES
Gave the floor report. Pt will go to room 544 % north after completion of MRI      Lupe Cha RN  04/13/18 9668

## 2018-04-13 NOTE — H&P
Name: Matt Cruz ADMIT: 2018   : 1955  PCP: No Known Provider    MRN: [id number] LOS: 0 days   AGE/SEX: 62 y.o. male  ROOM: /     Chief Complaint   Patient presents with   • Hypertension   • Neuro Deficit(s)     last week - denies symptoms today       Subjective   Mr. Cruz is a 62 y.o. male with a history of previous PCI in  and then quit going to follow up 8 years ago who presents to [facility] after having several weeks of progressive worsening chest pressure with exertion. Described as sternal pain. Occasional neck pain. No arm numbness or pain. He reports no associated dyspnea/orthopnea/pnd/edema. Pain is relieved with rest. He also had right sided facial droop and right arm numbness/weakness associated with speaking nonsensical words/slurred speech last week. This resolved after about 5 minutes and has not recurred. He reports that he did not have HA, vision changes, or difficulty swallowing. He reports being able to comprehend others but was not able to form the words he wanted to say. He went to PCP office and was found to have SBP in 220s so was told to report to ER. He has known HTN and has not taken medication for several years. He also reports being told he has high blood glucose but is unsure if diagnosed with diabetes. He has HLD. Nonsmoker. Denies depressed mood. States he just got busy raising family and didn't fit MD visits into his schedule.    History of Present Illness    Past Medical History:   Diagnosis Date   • Coronary artery disease    • Hyperlipidemia    • Hypertension    • Kidney stone      Past Surgical History:   Procedure Laterality Date   • CORONARY ANGIOPLASTY WITH STENT PLACEMENT     • KIDNEY STONE SURGERY       Family History   Problem Relation Age of Onset   • Coronary artery disease Father 60   • Heart attack Brother 48          Social History   Substance Use Topics   • Smoking status: Former Smoker   • Smokeless tobacco: Not on  file   • Alcohol use No       (Not in a hospital admission)  Allergies:  No Known Allergies    Review of Systems   Constitutional: Negative for chills and fever.   HENT: Negative for sore throat and trouble swallowing.    Eyes: Negative for pain and visual disturbance.   Respiratory: Negative for cough, shortness of breath and wheezing.    Cardiovascular: Positive for chest pain. Negative for palpitations and leg swelling.   Gastrointestinal: Negative for constipation, diarrhea, nausea and vomiting.   Endocrine: Negative for cold intolerance and heat intolerance.   Genitourinary: Negative for difficulty urinating and dysuria.   Musculoskeletal: Negative for neck pain and neck stiffness.   Skin: Negative for pallor and rash.   Allergic/Immunologic: Negative for environmental allergies and food allergies.   Neurological: Negative for seizures and syncope.   Hematological: Negative for adenopathy.   Psychiatric/Behavioral: Negative for agitation and confusion.        Objective    Vital Signs  Temp:  [98.5 °F (36.9 °C)] 98.5 °F (36.9 °C)  Heart Rate:  [71-76] 72  Resp:  [16-20] 18  BP: (162-185)/(87-96) 171/92  SpO2:  [94 %-98 %] 96 %  on  Flow (L/min):  [2] 2;   Device (Oxygen Therapy): nasal cannula  Body mass index is 33.67 kg/m².    Physical Exam   Constitutional: He is oriented to person, place, and time. He appears well-developed and well-nourished. No distress.   HENT:   Head: Normocephalic and atraumatic.   Nose: Nose normal.   Eyes: Conjunctivae and EOM are normal. Pupils are equal, round, and reactive to light.   Neck: Normal range of motion. Neck supple.   Cardiovascular: Normal rate, regular rhythm, normal heart sounds and intact distal pulses.    No murmur heard.  Pulmonary/Chest: Effort normal and breath sounds normal. No stridor. He has no wheezes. He has no rales.   Abdominal: Soft. Bowel sounds are normal. He exhibits no distension. There is no tenderness.   Musculoskeletal: Normal range of motion. He  exhibits edema (trace ble).   Neurological: He is alert and oriented to person, place, and time. No cranial nerve deficit. He exhibits normal muscle tone.   Skin: Skin is warm and dry. He is not diaphoretic.   Psychiatric: He has a normal mood and affect. His behavior is normal.   Nursing note and vitals reviewed.      Results Review:   I reviewed the patient's new clinical results. Reviewed imaging, agree with interpretation. Reviewed telemetry/EKG, sinus rhythm, mild st depression II/III/avf. Reviewed prior records.    Lab Results (last 24 hours)     Procedure Component Value Units Date/Time    CBC & Differential [[id number]] Collected:  04/13/18 1122    Specimen:  Blood Updated:  04/13/18 1143    Narrative:       The following orders were created for panel order CBC & Differential.  Procedure                               Abnormality         Status                     ---------                               -----------         ------                     CBC Auto Differential[[id number]]        Normal              Final result                 Please view results for these tests on the individual orders.    Comprehensive Metabolic Panel [[id number]]  (Abnormal) Collected:  04/13/18 1122    Specimen:  Blood Updated:  04/13/18 1158     Glucose 292 (H) mg/dL      BUN 13 mg/dL      Creatinine 0.56 (L) mg/dL      Sodium 137 mmol/L      Potassium 3.9 mmol/L      Chloride 101 mmol/L      CO2 25.1 mmol/L      Calcium 8.5 (L) mg/dL      Total Protein 6.6 g/dL      Albumin 3.70 g/dL      ALT (SGPT) 20 U/L      AST (SGOT) 17 U/L      Comment: Specimen hemolyzed.  Results may be affected.        Alkaline Phosphatase 155 (H) U/L      Total Bilirubin 0.3 mg/dL      eGFR Non African Amer 148 mL/min/1.73      Globulin 2.9 gm/dL      A/G Ratio 1.3 g/dL      BUN/Creatinine Ratio 23.2     Anion Gap 10.9 mmol/L     Troponin [[id number]]  (Normal) Collected:  04/13/18 1122    Specimen:  Blood Updated:  04/13/18 1157     Troponin T <0.010  ng/mL     Narrative:       Troponin T Reference Ranges:  Less than 0.03 ng/mL:    Negative for AMI  0.03 to 0.09 ng/mL:      Indeterminant for AMI  Greater than 0.09 ng/mL: Positive for AMI    CBC Auto Differential [[id number]]  (Normal) Collected:  04/13/18 1122    Specimen:  Blood Updated:  04/13/18 1143     WBC 6.75 10*3/mm3      RBC 5.46 10*6/mm3      Hemoglobin 16.6 g/dL      Hematocrit 50.8 %      MCV 93.0 fL      MCH 30.4 pg      MCHC 32.7 g/dL      RDW 13.6 %      RDW-SD 45.4 fl      MPV 11.6 fL      Platelets 149 10*3/mm3      Neutrophil % 48.0 %      Lymphocyte % 37.9 %      Monocyte % 8.9 %      Eosinophil % 4.4 %      Basophil % 0.4 %      Immature Grans % 0.4 %      Neutrophils, Absolute 3.23 10*3/mm3      Lymphocytes, Absolute 2.56 10*3/mm3      Monocytes, Absolute 0.60 10*3/mm3      Eosinophils, Absolute 0.30 10*3/mm3      Basophils, Absolute 0.03 10*3/mm3      Immature Grans, Absolute 0.03 10*3/mm3     BNP [[id number]]  (Normal) Collected:  04/13/18 1122    Specimen:  Blood Updated:  04/13/18 1319     proBNP 31.8 pg/mL     Narrative:       Among patients with dyspnea, NT-proBNP is highly sensitive for the detection of acute congestive heart failure. In addition NT-proBNP of <300 pg/ml effectively rules out acute congestive heart failure with 99% negative predictive value.          CT Head Without Contrast   Final Result       No evidence for acute intracranial pathology. If there remains clinical   concern for a CT occult infarct, further evaluation could be performed   with MR imaging.       These findings and recommendations were directly discussed with Patsy Shay of the emergency room staff on 04/13/2018 at approximately 12:19   PM.       Radiation dose reduction techniques were utilized, including automated   exposure control and exposure modulation based on body size.       This report was finalized on 4/13/2018 1:13 PM by Dr. Allan Carnes MD.          XR Chest 2 View   Final Result       MRI Brain With & Without Contrast    (Results Pending)   MRI Angiogram Head Without Contrast    (Results Pending)   MRI Angiogram Neck With & Without Contrast    (Results Pending)     Assessment/Plan      Active Hospital Problems (** Indicates Principal Problem)    Diagnosis Date Noted   • **Exertional chest pain [R07.9] 04/13/2018   • Essential hypertension [I10] 04/13/2018   • TIA (transient ischemic attack) [G45.9] 04/13/2018   • Hyperglycemia [R73.9] 04/13/2018   • HLD (hyperlipidemia) [E78.5] 04/13/2018      Resolved Hospital Problems    Diagnosis Date Noted Date Resolved   No resolved problems to display.     - CP: Start ASA, Statin, BBlocker. NTG prn. Hx CAD and multiple risk factors. Trend troponin, check Echo, repeat EKG, monitor on telemetry. Consult Cardiology.  - TIA: Returned to baseline and happened several days ago. Think BP control for CP purposes more urgent. Will obtain TIA workup with MRI/A head/neck. Echo as above. Consult Neurology.  - HTN: Hypertensive emergency possible depending on etiology of above symptoms. His BNP is not elevated and currently is at neurologic baseline. Renal function is normal. Systolic was 220 earlier today. Now improved to 180s. Monitor with Metoprolol for now. PRN labetalol.  - Hyperglycemia: BG>200 c/w diabetes. Check A1c, start SSI. DM educator.  - HLD: Check lipid profile. Statin as above.  - PPx AZAR/SCD  - Full Code    I discussed the patients findings and my recommendations with patient and consulting provider.      Jaden Aguilar MD  [facility]  04/13/18  2:51 PM

## 2018-04-14 ENCOUNTER — APPOINTMENT (OUTPATIENT)
Dept: CT IMAGING | Facility: HOSPITAL | Age: 63
End: 2018-04-14

## 2018-04-14 PROBLEM — E11.69 TYPE 2 DIABETES MELLITUS WITH OTHER SPECIFIED COMPLICATION (HCC): Status: ACTIVE | Noted: 2018-04-13

## 2018-04-14 LAB
ALBUMIN SERPL-MCNC: 3.6 G/DL (ref 3.5–5.2)
ALBUMIN/GLOB SERPL: 1.4 G/DL
ALP SERPL-CCNC: 129 U/L (ref 39–117)
ALT SERPL W P-5'-P-CCNC: 18 U/L (ref 1–41)
ANION GAP SERPL CALCULATED.3IONS-SCNC: 13.9 MMOL/L
ARTICHOKE IGE QN: 134 MG/DL (ref 0–100)
AST SERPL-CCNC: 13 U/L (ref 1–40)
BASOPHILS # BLD AUTO: 0.03 10*3/MM3 (ref 0–0.2)
BASOPHILS NFR BLD AUTO: 0.4 % (ref 0–1.5)
BILIRUB SERPL-MCNC: 0.4 MG/DL (ref 0.1–1.2)
BUN BLD-MCNC: 13 MG/DL (ref 8–23)
BUN/CREAT SERPL: 24.5 (ref 7–25)
CALCIUM SPEC-SCNC: 8.7 MG/DL (ref 8.6–10.5)
CHLORIDE SERPL-SCNC: 101 MMOL/L (ref 98–107)
CHOLEST SERPL-MCNC: 217 MG/DL (ref 0–200)
CO2 SERPL-SCNC: 23.1 MMOL/L (ref 22–29)
CREAT BLD-MCNC: 0.53 MG/DL (ref 0.76–1.27)
DEPRECATED RDW RBC AUTO: 45.9 FL (ref 37–54)
EOSINOPHIL # BLD AUTO: 0.32 10*3/MM3 (ref 0–0.7)
EOSINOPHIL NFR BLD AUTO: 3.9 % (ref 0.3–6.2)
ERYTHROCYTE [DISTWIDTH] IN BLOOD BY AUTOMATED COUNT: 13.7 % (ref 11.5–14.5)
GFR SERPL CREATININE-BSD FRML MDRD: >150 ML/MIN/1.73
GLOBULIN UR ELPH-MCNC: 2.6 GM/DL
GLUCOSE BLD-MCNC: 247 MG/DL (ref 65–99)
GLUCOSE BLDC GLUCOMTR-MCNC: 206 MG/DL (ref 70–130)
GLUCOSE BLDC GLUCOMTR-MCNC: 211 MG/DL (ref 70–130)
GLUCOSE BLDC GLUCOMTR-MCNC: 277 MG/DL (ref 70–130)
GLUCOSE BLDC GLUCOMTR-MCNC: 294 MG/DL (ref 70–130)
HBA1C MFR BLD: 11.3 % (ref 4.8–5.6)
HCT VFR BLD AUTO: 49.1 % (ref 40.4–52.2)
HDLC SERPL-MCNC: 31 MG/DL (ref 40–60)
HGB BLD-MCNC: 16.5 G/DL (ref 13.7–17.6)
IMM GRANULOCYTES # BLD: 0.04 10*3/MM3 (ref 0–0.03)
IMM GRANULOCYTES NFR BLD: 0.5 % (ref 0–0.5)
INR PPP: 0.93 (ref 0.9–1.1)
LDLC SERPL CALC-MCNC: ABNORMAL MG/DL (ref 0–100)
LDLC/HDLC SERPL: ABNORMAL {RATIO}
LYMPHOCYTES # BLD AUTO: 3.17 10*3/MM3 (ref 0.9–4.8)
LYMPHOCYTES NFR BLD AUTO: 38.5 % (ref 19.6–45.3)
MCH RBC QN AUTO: 31 PG (ref 27–32.7)
MCHC RBC AUTO-ENTMCNC: 33.6 G/DL (ref 32.6–36.4)
MCV RBC AUTO: 92.1 FL (ref 79.8–96.2)
MONOCYTES # BLD AUTO: 0.66 10*3/MM3 (ref 0.2–1.2)
MONOCYTES NFR BLD AUTO: 8 % (ref 5–12)
NEUTROPHILS # BLD AUTO: 4.02 10*3/MM3 (ref 1.9–8.1)
NEUTROPHILS NFR BLD AUTO: 48.7 % (ref 42.7–76)
PLATELET # BLD AUTO: 159 10*3/MM3 (ref 140–500)
PMV BLD AUTO: 11.7 FL (ref 6–12)
POTASSIUM BLD-SCNC: 3.7 MMOL/L (ref 3.5–5.2)
PROT SERPL-MCNC: 6.2 G/DL (ref 6–8.5)
PROTHROMBIN TIME: 12.3 SECONDS (ref 11.7–14.2)
RBC # BLD AUTO: 5.33 10*6/MM3 (ref 4.6–6)
SODIUM BLD-SCNC: 138 MMOL/L (ref 136–145)
TRIGL SERPL-MCNC: 430 MG/DL (ref 0–150)
TROPONIN T SERPL-MCNC: <0.01 NG/ML (ref 0–0.03)
TROPONIN T SERPL-MCNC: <0.01 NG/ML (ref 0–0.03)
TSH SERPL DL<=0.05 MIU/L-ACNC: 2.73 MIU/ML (ref 0.27–4.2)
VIT B12 BLD-MCNC: 451 PG/ML (ref 211–946)
VLDLC SERPL-MCNC: ABNORMAL MG/DL (ref 5–40)
WBC NRBC COR # BLD: 8.24 10*3/MM3 (ref 4.5–10.7)

## 2018-04-14 PROCEDURE — 85025 COMPLETE CBC W/AUTO DIFF WBC: CPT | Performed by: INTERNAL MEDICINE

## 2018-04-14 PROCEDURE — 84443 ASSAY THYROID STIM HORMONE: CPT | Performed by: INTERNAL MEDICINE

## 2018-04-14 PROCEDURE — 99233 SBSQ HOSP IP/OBS HIGH 50: CPT | Performed by: INTERNAL MEDICINE

## 2018-04-14 PROCEDURE — 63710000001 INSULIN ASPART PER 5 UNITS: Performed by: INTERNAL MEDICINE

## 2018-04-14 PROCEDURE — 80061 LIPID PANEL: CPT | Performed by: INTERNAL MEDICINE

## 2018-04-14 PROCEDURE — 36415 COLL VENOUS BLD VENIPUNCTURE: CPT | Performed by: INTERNAL MEDICINE

## 2018-04-14 PROCEDURE — 93010 ELECTROCARDIOGRAM REPORT: CPT | Performed by: INTERNAL MEDICINE

## 2018-04-14 PROCEDURE — 84484 ASSAY OF TROPONIN QUANT: CPT | Performed by: INTERNAL MEDICINE

## 2018-04-14 PROCEDURE — 92610 EVALUATE SWALLOWING FUNCTION: CPT

## 2018-04-14 PROCEDURE — 25010000002 IOPAMIDOL 61 % SOLUTION: Performed by: INTERNAL MEDICINE

## 2018-04-14 PROCEDURE — 70496 CT ANGIOGRAPHY HEAD: CPT

## 2018-04-14 PROCEDURE — 70498 CT ANGIOGRAPHY NECK: CPT

## 2018-04-14 PROCEDURE — 93005 ELECTROCARDIOGRAM TRACING: CPT | Performed by: INTERNAL MEDICINE

## 2018-04-14 PROCEDURE — 83036 HEMOGLOBIN GLYCOSYLATED A1C: CPT | Performed by: INTERNAL MEDICINE

## 2018-04-14 PROCEDURE — 83721 ASSAY OF BLOOD LIPOPROTEIN: CPT | Performed by: INTERNAL MEDICINE

## 2018-04-14 PROCEDURE — 99254 IP/OBS CNSLTJ NEW/EST MOD 60: CPT | Performed by: PSYCHIATRY & NEUROLOGY

## 2018-04-14 PROCEDURE — 85610 PROTHROMBIN TIME: CPT | Performed by: INTERNAL MEDICINE

## 2018-04-14 PROCEDURE — 82962 GLUCOSE BLOOD TEST: CPT

## 2018-04-14 PROCEDURE — 82607 VITAMIN B-12: CPT | Performed by: PSYCHIATRY & NEUROLOGY

## 2018-04-14 PROCEDURE — 80053 COMPREHEN METABOLIC PANEL: CPT | Performed by: INTERNAL MEDICINE

## 2018-04-14 RX ORDER — CLOPIDOGREL BISULFATE 75 MG/1
75 TABLET ORAL DAILY
Status: DISCONTINUED | OUTPATIENT
Start: 2018-04-14 | End: 2018-04-17 | Stop reason: HOSPADM

## 2018-04-14 RX ORDER — CARVEDILOL 6.25 MG/1
6.25 TABLET ORAL EVERY 12 HOURS SCHEDULED
Status: DISCONTINUED | OUTPATIENT
Start: 2018-04-14 | End: 2018-04-15

## 2018-04-14 RX ORDER — VALSARTAN 80 MG/1
80 TABLET ORAL
Status: DISCONTINUED | OUTPATIENT
Start: 2018-04-14 | End: 2018-04-15

## 2018-04-14 RX ORDER — HYDRALAZINE HYDROCHLORIDE 20 MG/ML
20 INJECTION INTRAMUSCULAR; INTRAVENOUS EVERY 6 HOURS PRN
Status: DISCONTINUED | OUTPATIENT
Start: 2018-04-14 | End: 2018-04-17 | Stop reason: HOSPADM

## 2018-04-14 RX ORDER — CHOLECALCIFEROL (VITAMIN D3) 125 MCG
1000 CAPSULE ORAL DAILY
Status: DISCONTINUED | OUTPATIENT
Start: 2018-04-14 | End: 2018-04-17 | Stop reason: HOSPADM

## 2018-04-14 RX ORDER — ASPIRIN 81 MG/1
81 TABLET ORAL DAILY
Status: DISCONTINUED | OUTPATIENT
Start: 2018-04-14 | End: 2018-04-17 | Stop reason: HOSPADM

## 2018-04-14 RX ADMIN — CARVEDILOL 6.25 MG: 6.25 TABLET, FILM COATED ORAL at 20:46

## 2018-04-14 RX ADMIN — METOPROLOL TARTRATE 25 MG: 25 TABLET ORAL at 09:12

## 2018-04-14 RX ADMIN — INSULIN ASPART 4 UNITS: 100 INJECTION, SOLUTION INTRAVENOUS; SUBCUTANEOUS at 18:36

## 2018-04-14 RX ADMIN — VALSARTAN 80 MG: 80 TABLET, FILM COATED ORAL at 13:53

## 2018-04-14 RX ADMIN — INSULIN ASPART 6 UNITS: 100 INJECTION, SOLUTION INTRAVENOUS; SUBCUTANEOUS at 00:32

## 2018-04-14 RX ADMIN — CLOPIDOGREL 75 MG: 75 TABLET, FILM COATED ORAL at 09:20

## 2018-04-14 RX ADMIN — INSULIN ASPART 4 UNITS: 100 INJECTION, SOLUTION INTRAVENOUS; SUBCUTANEOUS at 09:11

## 2018-04-14 RX ADMIN — ATORVASTATIN CALCIUM 80 MG: 80 TABLET, FILM COATED ORAL at 20:45

## 2018-04-14 RX ADMIN — ASPIRIN 81 MG: 81 TABLET ORAL at 11:28

## 2018-04-14 RX ADMIN — INSULIN ASPART 6 UNITS: 100 INJECTION, SOLUTION INTRAVENOUS; SUBCUTANEOUS at 11:28

## 2018-04-14 RX ADMIN — Medication 1000 MCG: at 16:25

## 2018-04-14 RX ADMIN — INSULIN ASPART 6 UNITS: 100 INJECTION, SOLUTION INTRAVENOUS; SUBCUTANEOUS at 21:52

## 2018-04-14 RX ADMIN — CARVEDILOL 6.25 MG: 6.25 TABLET, FILM COATED ORAL at 13:53

## 2018-04-14 RX ADMIN — IOPAMIDOL 95 ML: 612 INJECTION, SOLUTION INTRAVENOUS at 10:40

## 2018-04-14 NOTE — PROGRESS NOTES
"Patient Name: Matt Cruz  :1955  62 y.o.      Patient Care Team:  No Known Provider as PCP - General    Interval History:   Ruled out for myocardial infarction.    Subjective:  Following for chest pain     Objective   Vital Signs  Temp:  [97.2 °F (36.2 °C)-98.3 °F (36.8 °C)] 97.9 °F (36.6 °C)  Heart Rate:  [67-84] 67  Resp:  [18-20] 18  BP: (149-185)/() 174/97    Intake/Output Summary (Last 24 hours) at 18 1103  Last data filed at 18 2341   Gross per 24 hour   Intake              360 ml   Output                0 ml   Net              360 ml     Flowsheet Rows    Flowsheet Row First Filed Value   Admission Height 170.2 cm (67\") Documented at 2018 1020   Admission Weight 97.5 kg (215 lb) Documented at 2018 1020          Physical Exam:   General Appearance:    Alert, cooperative, in no acute distress   Lungs:     Clear to auscultation.  Normal respiratory effort and rate.      Heart:    Regular rhythm and normal rate, normal S1 and S2, no murmurs, gallops or rubs.     Chest Wall:    No abnormalities observed   Abdomen:     Soft, nontender, positive bowel sounds.     Extremities:   no cyanosis, clubbing or edema.  No marked joint deformities.  Adequate musculoskeletal strength.       Results Review:      Results from last 7 days  Lab Units 18   SODIUM mmol/L 138   POTASSIUM mmol/L 3.7   CHLORIDE mmol/L 101   CO2 mmol/L 23.1   BUN mg/dL 13   CREATININE mg/dL 0.53*   GLUCOSE mg/dL 247*   CALCIUM mg/dL 8.7       Results from last 7 days  Lab Units 18  0001 18  1941   TROPONIN T ng/mL <0.010 <0.010 <0.010       Results from last 7 days  Lab Units 18   WBC 10*3/mm3 8.24   HEMOGLOBIN g/dL 16.5   HEMATOCRIT % 49.1   PLATELETS 10*3/mm3 159       Results from last 7 days  Lab Units 18  06   INR  0.93       Results from last 7 days  Lab Units 18   CHOLESTEROL mg/dL 217*           Results from last 7 days  Lab Units " 18  0613   CHOLESTEROL mg/dL 217*   TRIGLYCERIDES mg/dL 430*   HDL CHOL mg/dL 31*   LDL CHOL mg/dL 134*         Medication Review:     aspirin 81 mg Oral Daily   atorvastatin 80 mg Oral Nightly   clopidogrel 75 mg Oral Daily   gadobenate dimeglumine 20 mL Intravenous Once in imaging   insulin aspart 0-9 Units Subcutaneous 4x Daily With Meals & Nightly   metoprolol tartrate 25 mg Oral Q12H             Assessment/Plan     1.  Chest pain. Plan is for heart catheterization on Monday.  2.  Hypertension.  On a low-dose of metoprolol.  Switch to carvedilol for better blood pressure control.  Add an angiotensin receptor blocker given diabetes.  3.   Diabetes.  Poorly controlled.  Hemoglobin A1c 11.3.  4.  Strokelike symptoms.   MRI was limited.  No acute stroke noted    5.  Obesity  6.  Family history of premature coronary disease in his brother who  of a heart attack at a young age.    Ashley Alba MD, [facility]  18  11:03 AM

## 2018-04-14 NOTE — PLAN OF CARE
Problem: Patient Care Overview  Goal: Plan of Care Review   04/14/18 6547   Coping/Psychosocial   Plan of Care Reviewed With patient   Coping/Psychosocial   Patient Agreement with Plan of Care agrees   OTHER   Outcome Summary Functional swallow with regular and thin liquids.

## 2018-04-14 NOTE — THERAPY DISCHARGE NOTE
Acute Care - Speech Language Pathology   Swallow Eval/Discharge [facility]     Patient Name: Matt Cruz  : 1955  MRN: [id number]  Today's Date: 2018               SPEECH-LANGUAGE PATHOLOGY EVALUATION - SWALLOW  Subjective: The patient was seen on this date for a Clinical Swallow evaluation.  Patient was alert and cooperative.  Significant history: HTN, TIA  Objective: Textures given included thin liquid and regular consistency.  Assessment: Difficulties were noted with none of the above consistencies.  Observations:  no difficulty noted  SLP Findings:  Patient presents with functional oropharyngeal dysphagia, without esophageal component.   Recommendations: Diet Textures: thin liquid, regular consistency food.  Medications should be taken whole with thin liquids.  Recommended Strategies: . Oral care before breakfast, after all meals and PRN.  Dysphagia therapy is not recommended. Rationale: functional swallow..      Admit Date: 2018    Visit Dx:    ICD-10-CM ICD-9-CM   1. Exertional chest pain R07.9 786.50   2. Transient cerebral ischemia, unspecified type G45.9 435.9   3. Oropharyngeal dysphagia R13.12 787.22     Patient Active Problem List   Diagnosis   • Exertional chest pain   • Essential hypertension   • TIA (transient ischemic attack)   • Type 2 diabetes mellitus with other specified complication   • HLD (hyperlipidemia)     Past Medical History:   Diagnosis Date   • Coronary artery disease    • Hyperlipidemia    • Hypertension    • Kidney stone      Past Surgical History:   Procedure Laterality Date   • CORONARY ANGIOPLASTY WITH STENT PLACEMENT     • KIDNEY STONE SURGERY            SWALLOW EVALUATION (last 72 hours)      SLP Adult Swallow Evaluation     Row Name 18 1500                   Rehab Evaluation    Document Type evaluation  -LS        Subjective Information no complaints  -LS        Patient Observations alert;cooperative;agree to therapy  -LS        Symptoms Noted  During/After Treatment none  -LS           General Information    Patient Profile Reviewed yes  -LS        Pertinent History Of Current Problem --   HTN, TIA  -LS        Current Method of Nutrition regular textures;thin liquids  -LS        Precautions/Limitations, Vision WFL  -LS        Precautions/Limitations, Hearing WFL  -LS        Prior Level of Function-Communication WFL  -LS        Prior Level of Function-Swallowing no diet consistency restrictions  -LS        Plans/Goals Discussed with patient  -        Barriers to Rehab none identified  -LS        Patient's Goals for Discharge return home  -LS           Oral Motor and Function    Dentition Assessment natural, present and adequate  -LS        Secretion Management WNL/WFL  -LS        Volitional Swallow WFL  -LS           Oral Musculature and Cranial Nerve Assessment    Oral Motor General Assessment WFL  -LS           General Eating/Swallowing Observations    Respiratory Support Currently in Use room air  -LS        Eating/Swallowing Skills self-fed  -LS        Positioning During Eating upright 90 degree  -LS        Utensils Used spoon;cup  -LS        Consistencies Trialed regular textures;thin liquids  -LS           Clinical Swallow Eval    Oral Prep Phase WFL  -LS        Oral Transit WFL  -LS        Oral Residue WFL  -LS        Pharyngeal Phase no overt signs/symptoms of pharyngeal impairment  -LS        Clinical Swallow Evaluation Summary Functional oropharyngeal swallow.  -LS           Clinical Impression    SLP Swallowing Diagnosis functional oral phase;functional pharyngeal phase  -LS        Functional Impact no impact on function  -LS        Rehab Potential/Prognosis, Swallowing good, to achieve stated therapy goals  -        Criteria for Skilled Therapeutic Interventions Met demonstrates skilled criteria  -           Recommendations    Therapy Frequency (Swallow) PRN  -LS        Predicted Duration Therapy Intervention (Days) until discharge  -         SLP Diet Recommendation regular textures;thin liquids  -LS        Recommended Precautions and Strategies upright posture during/after eating;small bites of food and sips of liquid  -LS        SLP Rec. for Method of Medication Administration meds whole;with thin liquids  -LS        Anticipated Dischage Disposition home  -LS           Swallow Goals (SLP)    Oral Nutrition/Hydration Goal Selection (SLP) --  -LS          User Key  (r) = Recorded By, (t) = Taken By, (c) = Cosigned By    Initials Name Effective Dates    ALPHONSE Tierney MS CCC-SLP 03/07/18 -         EDUCATION  The patient has been educated in the following areas:   Dysphagia (Swallowing Impairment)      .    SLP Recommendation and Plan  SLP Swallowing Diagnosis: functional oral phase, functional pharyngeal phase  SLP Diet Recommendation: regular textures, thin liquids           Criteria for Skilled Therapeutic Interventions Met: demonstrates skilled criteria  Anticipated Dischage Disposition: home  Rehab Potential/Prognosis, Swallowing: good, to achieve stated therapy goals  Therapy Frequency (Swallow): PRN  Predicted Duration Therapy Intervention (Days): until discharge       Plan of Care Reviewed With: patient  Outcome Summary: Functional swallow with regular and thin liquids.           SLP Outcome Measures (last 72 hours)      SLP Outcome Measures     Row Name 04/14/18 1500             SLP Outcome Measures    Outcome Measure Used? Adult NOMS  -         FCM Scores    FCM Chosen Swallowing  -      Swallowing FCM Score 7  -        User Key  (r) = Recorded By, (t) = Taken By, (c) = Cosigned By    Initials Name Effective Dates    ALPHONSE Tierney MS CCC-SLP 03/07/18 -            Time Calculation:         Time Calculation- SLP     Row Name 04/14/18 1542             Time Calculation- SLP    SLP Received On 04/14/18  -LS        User Key  (r) = Recorded By, (t) = Taken By, (c) = Cosigned By    Initials Name Provider Type    ALPHONSE Tierney MS  CCC-SLP Speech and Language Pathologist          Therapy Charges for Today     Code Description Service Date Service Provider Modifiers Qty    [id number] HC ST EVAL ORAL PHARYNG SWALLOW 4 4/14/2018 Isauro Tierney MS CCC-SLP GN 1               SLP Discharge Summary  Anticipated Dischage Disposition: home    Isauro Tierney MS CCC-SLP  4/14/2018

## 2018-04-14 NOTE — PLAN OF CARE
Problem: Patient Care Overview  Goal: Plan of Care Review   04/14/18 3026   Coping/Psychosocial   Plan of Care Reviewed With patient   OTHER   Outcome Summary Pt up ad oswaldo, reports no h/o falls, no use of AD and ambulating like normal. Reviewed activity/walking recommendations while inpatient and PT POC to DC PT - no inpatient PT needs, pt aware.

## 2018-04-14 NOTE — PLAN OF CARE
Problem: Patient Care Overview  Goal: Plan of Care Review  Outcome: Ongoing (interventions implemented as appropriate)   04/14/18 0141   Coping/Psychosocial   Plan of Care Reviewed With patient   Coping/Psychosocial   Patient Agreement with Plan of Care agrees   Plan of Care Review   Progress improving   OTHER   Outcome Summary A+O X 4. V/S WDL. Denied pain. Educated on safety and called light use. NIH 0. Currently on ASA + Lipitor. Sleeping w/o SOA at rest, O2 therapy at 2 L r/t desaturation in 80's.       Problem: Stroke (Ischemic) (Adult)  Goal: Signs and Symptoms of Listed Potential Problems Will be Absent, Minimized or Managed (Stroke)  Outcome: Ongoing (interventions implemented as appropriate)

## 2018-04-14 NOTE — SIGNIFICANT NOTE
04/14/18 0949   Rehab Time/Intention   Evaluation Not Performed patient unavailable for evaluation  ()   Rehab Treatment   Discipline occupational therapist

## 2018-04-14 NOTE — PROGRESS NOTES
Name: Matt Cruz ADMIT: 2018   : 1955  PCP: No Known Provider    MRN: [id number] LOS: 1 days   AGE/SEX: 62 y.o. male  ROOM: [facility]/   Subjective   No CP SOA NVD reported. No neurologic symptoms.    Objective   Vital Signs  Temp:  [97.2 °F (36.2 °C)-98.3 °F (36.8 °C)] 97.9 °F (36.6 °C)  Heart Rate:  [67-84] 67  Resp:  [18-20] 18  BP: (149-178)/() 174/97  SpO2:  [93 %-96 %] 93 %  on  Flow (L/min):  [1-2] 2;   Device (Oxygen Therapy): nasal cannula  Body mass index is 34.97 kg/m².    Physical Exam   Constitutional: He is oriented to person, place, and time. He appears well-developed. No distress.   HENT:   Head: Normocephalic and atraumatic.   Eyes: Conjunctivae and EOM are normal.   Neck: Normal range of motion. Neck supple.   Cardiovascular: Normal rate, regular rhythm and intact distal pulses.    Pulmonary/Chest: Effort normal and breath sounds normal. He has no wheezes.   Abdominal: Soft. He exhibits no distension.   Musculoskeletal: Normal range of motion. He exhibits no edema.   Neurological: He is alert and oriented to person, place, and time.   Skin: Skin is warm and dry. He is not diaphoretic.   Psychiatric: He has a normal mood and affect. His behavior is normal.   Nursing note and vitals reviewed.      Results Review:       I reviewed the patient's new clinical results. Reviewed imaging, agree with interpretation. Reviewed telemetry, sinus rhythm.    Results from last 7 days  Lab Units 18  0613 18  1122   WBC 10*3/mm3 8.24 6.75   HEMOGLOBIN g/dL 16.5 16.6   PLATELETS 10*3/mm3 159 149     Results from last 7 days  Lab Units 18  0613 18  1122   SODIUM mmol/L 138 137   POTASSIUM mmol/L 3.7 3.9   CHLORIDE mmol/L 101 101   CO2 mmol/L 23.1 25.1   BUN mg/dL 13 13   CREATININE mg/dL 0.53* 0.56*   GLUCOSE mg/dL 247* 292*   Estimated Creatinine Clearance: 163.7 mL/min (by C-G formula based on SCr of 0.53 mg/dL (L)).  Results from last 7 days  Lab Units 18  2763  04/13/18  1122   CALCIUM mg/dL 8.7 8.5*   ALBUMIN g/dL 3.60 3.70         aspirin 81 mg Oral Daily   atorvastatin 80 mg Oral Nightly   carvedilol 6.25 mg Oral Q12H   clopidogrel 75 mg Oral Daily   gadobenate dimeglumine 20 mL Intravenous Once in imaging   insulin aspart 0-9 Units Subcutaneous 4x Daily With Meals & Nightly   valsartan 80 mg Oral Q24H      Diet Regular; Cardiac      Assessment/Plan      Active Hospital Problems (** Indicates Principal Problem)    Diagnosis Date Noted   • **Exertional chest pain [R07.9] 04/13/2018   • Essential hypertension [I10] 04/13/2018   • TIA (transient ischemic attack) [G45.9] 04/13/2018   • Type 2 diabetes mellitus with other specified complication [E11.69] 04/13/2018   • HLD (hyperlipidemia) [E78.5] 04/13/2018      Resolved Hospital Problems    Diagnosis Date Noted Date Resolved   No resolved problems to display.     - HTN: Agree with changing to coreg for improved BP control. Valsartan added as well. Hydralazine PRN.  - DM2: A1c 11.3. Discussed need for insulin therapy for control. Weight based would suggest 25 units nightly of levemir, but has only required 16 units SSI. Will begin with 15 units levemir nightly and titrate from there. DM educator consult.  - CP: EF preserved and normal wall motion on Echo. Planned cath on Monday. ASA/Plavix/Statin. Cardiology following.  - TIA: Limited MRI due to anxiety/movement despite ativan but no acute stroke. CTA planned. ASA/Statin. Neurology following.  -Disposition: TBD    Jaden Aguilar MD  [facility]  04/14/18  12:39 PM

## 2018-04-14 NOTE — NURSING NOTE
Referral received via stroke order set. Patient at baseline, has no PT needs. Not appropriate for acute rehab. Will sign off.

## 2018-04-14 NOTE — PLAN OF CARE
Problem: Patient Care Overview  Goal: Plan of Care Review  Outcome: Ongoing (interventions implemented as appropriate)   04/14/18 1010   Coping/Psychosocial   Plan of Care Reviewed With patient   Coping/Psychosocial   Patient Agreement with Plan of Care agrees   Plan of Care Review   Progress improving   OTHER   Outcome Summary A&O X 4, CTA today. On 2LNC 93-94%. NIH=0. Educated on Plavix and bleeding precautions. Continue to monitor.     Goal: Individualization and Mutuality  Outcome: Ongoing (interventions implemented as appropriate)    Goal: Discharge Needs Assessment   04/14/18 1010   Discharge Needs Assessment   Readmission Within the Last 30 Days no previous admission in last 30 days   Concerns to be Addressed other (see comments)   Concerns Comments Compliance w/diet and blood glucose meds.   Patient/Family Anticipates Transition to home   Patient/Family Anticipated Services at Transition nutritionist       Problem: Stroke (Ischemic) (Adult)  Goal: Signs and Symptoms of Listed Potential Problems Will be Absent, Minimized or Managed (Stroke)  Outcome: Ongoing (interventions implemented as appropriate)   04/14/18 1010   Goal/Outcome Evaluation   Problems Assessed (Stroke (Ischemic)) all   Problems Assessed (Stroke (Ischemic)) none       Problem: Patient Care Overview  Goal: Plan of Care Review   04/14/18 1010   Coping/Psychosocial   Plan of Care Reviewed With patient   Plan of Care Review   Progress improving   OTHER   Outcome Summary A&O X 4, CTA today. On 2LNC 93-94%. NIH=0. Educated on Plavix and bleeding precautions. Continue to monitor.     Goal: Individualization and Mutuality  Outcome: Ongoing (interventions implemented as appropriate)   04/14/18 1010   Individualization   Patient Specific Preferences BRP, no bed alarm, call for assistance   Patient Specific Goals (Include Timeframe) No falls, safe environment   Patient Specific Interventions Educated on Plavix and bleeding precautions. CTA today.   this am. Tx'd w/ssi. Continue to monitor.

## 2018-04-14 NOTE — CONSULTS
Encounter Date: 4/14/2018    CHIEF COMPLAINT: One episode of speech difficulty with the right facial numbness and right arm numbness lasting 5 minutes referred by Dr. Aguilar    Patient Active Problem List   Diagnosis   • Exertional chest pain   • Essential hypertension   • TIA (transient ischemic attack)   • Hyperglycemia   • HLD (hyperlipidemia)       PRESENT ILLNESS:    Portions of this notes is copied from previous physician encounters, reviewed and edited appropriately.    Background:     Matt Cruz is a 62 y.o. male with history of fracture dyslipidemia, hypertension, coronary artery disease status post stents now admitted for workup from primary care office because of uncontrolled hypertension and One episode of speech difficulty with the right facial numbness and right arm numbness lasting 5 minutes. This episode happened 1 week back.  Patient blood pressure was very high in the systolics 220.  Patient does not take any hypertension medications.    No previous history of TIA or strokelike symptoms.    Patient examined by the bedside: Patient says that he knows he has hypertension but stopped going to the doctor's.  His risk factors are extremely uncontrolled.      Review of systems   No neck stiffness  No photophobia  All systems reviewed and are negative.         Past Medical History:   Diagnosis Date   • Coronary artery disease    • Hyperlipidemia    • Hypertension    • Kidney stone        Past Surgical History:   Procedure Laterality Date   • CORONARY ANGIOPLASTY WITH STENT PLACEMENT     • KIDNEY STONE SURGERY         Current Facility-Administered Medications   Medication Dose Route Frequency Provider Last Rate Last Dose   • acetaminophen (TYLENOL) tablet 650 mg  650 mg Oral Q4H PRN Jaden Aguilar MD       • aspirin tablet 325 mg  325 mg Oral Daily Jaden Aguilar MD        Or   • aspirin suppository 300 mg  300 mg Rectal Daily Jaden Aguilar MD       • atorvastatin (LIPITOR) tablet 80 mg   80 mg Oral Nightly Jaden Aguilar MD   80 mg at 04/13/18 2338   • bisacodyl (DULCOLAX) suppository 10 mg  10 mg Rectal Daily PRN Jaden Aguilar MD       • dextrose (D50W) solution 25 g  25 g Intravenous Q15 Min PRN Jaden Aguilar MD       • dextrose (GLUTOSE) oral gel 15 g  15 g Oral Q15 Min PRN Jaden Aguilar MD       • docusate sodium (COLACE) capsule 100 mg  100 mg Oral BID PRN Jaden Aguilar MD       • gadobenate dimeglumine (MULTIHANCE) injection 20 mL  20 mL Intravenous Once in imaging Jaden Aguilar MD       • glucagon (human recombinant) (GLUCAGEN DIAGNOSTIC) injection 1 mg  1 mg Subcutaneous PRN Jaden Aguilar MD       • HYDROcodone-acetaminophen (NORCO) 5-325 MG per tablet 1 tablet  1 tablet Oral Q4H PRN Jaden Aguilar MD       • insulin aspart (novoLOG) injection 0-9 Units  0-9 Units Subcutaneous 4x Daily With Meals & Nightly Jaden Aguilar MD   6 Units at 04/14/18 0032   • labetalol (NORMODYNE,TRANDATE) injection 20 mg  20 mg Intravenous Q6H PRN Jaden Aguilar MD       • metoprolol tartrate (LOPRESSOR) tablet 25 mg  25 mg Oral Q12H Jaden Aguilar MD   25 mg at 04/13/18 2338   • nitroglycerin (NITROSTAT) SL tablet 0.4 mg  0.4 mg Sublingual Q5 Min PRN Jaden Aguilar MD       • ondansetron (ZOFRAN) tablet 4 mg  4 mg Oral Q6H PRN Jaden Aguilar MD        Or   • ondansetron ODT (ZOFRAN-ODT) disintegrating tablet 4 mg  4 mg Oral Q6H PRN Jaden Aguilar MD        Or   • ondansetron (ZOFRAN) injection 4 mg  4 mg Intravenous Q6H PRN Jaden Aguilar MD       • sodium chloride 0.9 % flush 1-10 mL  1-10 mL Intravenous PRN Jaden Aguilar MD           No Known Allergies    Social History     Social History   • Marital status:      Spouse name: N/A   • Number of children: N/A   • Years of education: N/A     Occupational History   • director      Social History Main Topics   • Smoking status: Former Smoker   • Smokeless tobacco: Not on file   • Alcohol  use No   • Drug use: No   • Sexual activity: Defer     Other Topics Concern   • Not on file     Social History Narrative   • No narrative on file       Family History   Problem Relation Age of Onset   • Coronary artery disease Father 60   • Heart attack Brother 48            Family Status   Relation Status   • Father    • Brother        No current facility-administered medications on file prior to encounter.      No current outpatient prescriptions on file prior to encounter.           PHYSICAL EXAMINATION:    Vitals: Patient Vitals for the past 4 hrs:   BP Temp Temp src Pulse Resp SpO2 Weight   18 0714 174/97 97.9 °F (36.6 °C) Oral 67 18 93 % -   18 0539 - - - - - - 101 kg (223 lb 4.8 oz)     Temp:  [97.2 °F (36.2 °C)-98.5 °F (36.9 °C)] 97.9 °F (36.6 °C)  Heart Rate:  [67-84] 67  Resp:  [16-20] 18  BP: (149-185)/() 174/97    General:  pleasant in no acute distress.  HEENT: No pallor or icterus. Neck supple. No Carotid bruits.  Heart: S1 and S2 normal with regular rhythm.  No murmur.       GENERAL NEUROLOGIC EXAM     Mental Status: Awake alert and oriented to person place and time. Language is normal for comprehension, repetition, naming and fluency. Recent and remote memory were normal.  Attention span and concentration were normal. Fund of knowledge was normal.      Cranial nerves:  Fundi were normal bilaterally.  Visual fields were full to confrontation.  Pupils are equal regular and reactive to light. Extraocular movements are intact. Facial sensation was normal and symmetrical bilaterally. Muscles of facial expression were strong and symmetric. Hearing to finger rub normal bilaterally. Palate elevates symmetrically. Sternocleidomastoid and trapezius normal. The tongue protrudes midline without atrophy or fasciculations.      Motor: Normal tone and bulk with no involuntary movements or pronator drift.    Strength normal 5/5 in bilateral upper and lower extremities.     Sensation: Light  touch, pin prick, vibration and joint position sense were normal in the upper and lower extremities.     Reflexes: 1+ bilaterally symmetrical with down going toes.    Coordination: finger nose and heel shin test normal bilaterally.     Gait: Deffered    Labs:     Lab Results   Component Value Date    HGB 16.5 04/14/2018    HCT 49.1 04/14/2018    WBC 8.24 04/14/2018     04/14/2018     Lab Results   Component Value Date    BUN 13 04/14/2018    CALCIUM 8.7 04/14/2018     04/14/2018    K 3.7 04/14/2018     04/14/2018    CO2 23.1 04/14/2018     Lab Results   Component Value Date    ALT 18 04/14/2018    AST 13 04/14/2018    BILITOT 0.4 04/14/2018     Lab Results   Component Value Date    PROTIME 12.3 04/14/2018    INR 0.93 04/14/2018     No components found for: POCGLUC  No components found for: A1C  Lab Results   Component Value Date    HDL 31 (L) 04/14/2018    LDL  04/14/2018      Comment:      Unable to calculate     (H) 04/14/2018     No components found for: B12  Lab Results   Component Value Date    TSH 2.730 04/14/2018       Lab Results (last 24 hours)     Procedure Component Value Units Date/Time    LDL Cholesterol, Direct [[id number]]  (Abnormal) Collected:  04/14/18 0613    Specimen:  Blood Updated:  04/14/18 0752     LDL Cholesterol  134 (H) mg/dL     Narrative:         LDL Reference Ranges    (U.S. Department of Health and Human Services ATP III Classifications)    Optimal          <100 mg/dl  Near Optimal     100-129 mg/dl  Borderline High  130-159 mg/dl  High             160-189 mg/dl  Very High        >189 mg/dl    TSH [[id number]]  (Normal) Collected:  04/14/18 0613    Specimen:  Blood Updated:  04/14/18 0734     TSH 2.730 mIU/mL     Troponin [[id number]]  (Normal) Collected:  04/14/18 0613    Specimen:  Blood Updated:  04/14/18 0734     Troponin T <0.010 ng/mL     Narrative:       Troponin T Reference Ranges:  Less than 0.03 ng/mL:    Negative for AMI  0.03 to 0.09 ng/mL:       Indeterminant for AMI  Greater than 0.09 ng/mL: Positive for AMI    Lipid Panel [[id number]]  (Abnormal) Collected:  04/14/18 0613    Specimen:  Blood Updated:  04/14/18 0728     Total Cholesterol 217 (H) mg/dL      Triglycerides 430 (H) mg/dL      HDL Cholesterol 31 (L) mg/dL      LDL Cholesterol  -- mg/dL      Comment: Unable to calculate        VLDL Cholesterol -- mg/dL      Comment: Unable to calculate        LDL/HDL Ratio --     Comment: Unable to calculate       Narrative:       Cholesterol Reference Ranges  (U.S. Department of Health and Human Services ATP III Classifications)    Desirable          <200 mg/dL  Borderline High    200-239 mg/dL  High Risk          >240 mg/dL      Triglyceride Reference Ranges  (U.S. Department of Health and Human Services ATP III Classifications)    Normal           <150 mg/dL  Borderline High  150-199 mg/dL  High             200-499 mg/dL  Very High        >500 mg/dL    HDL Reference Ranges  (U.S. Department of Health and Human Services ATP III Classifcations)    Low     <40 mg/dl (major risk factor for CHD)  High    >60 mg/dl ('negative' risk factor for CHD)        LDL Reference Ranges  (U.S. Department of Health and Human Services ATP III Classifcations)    Optimal          <100 mg/dL  Near Optimal     100-129 mg/dL  Borderline High  130-159 mg/dL  High             160-189 mg/dL  Very High        >189 mg/dL    Comprehensive Metabolic Panel [[id number]]  (Abnormal) Collected:  04/14/18 0613    Specimen:  Blood Updated:  04/14/18 0728     Glucose 247 (H) mg/dL      BUN 13 mg/dL      Creatinine 0.53 (L) mg/dL      Sodium 138 mmol/L      Potassium 3.7 mmol/L      Chloride 101 mmol/L      CO2 23.1 mmol/L      Calcium 8.7 mg/dL      Total Protein 6.2 g/dL      Albumin 3.60 g/dL      ALT (SGPT) 18 U/L      AST (SGOT) 13 U/L      Alkaline Phosphatase 129 (H) U/L      Total Bilirubin 0.4 mg/dL      eGFR Non African Amer >150 mL/min/1.73      Globulin 2.6 gm/dL      A/G Ratio 1.4 g/dL       BUN/Creatinine Ratio 24.5     Anion Gap 13.9 mmol/L     Protime-INR [[id number]]  (Normal) Collected:  04/14/18 0613    Specimen:  Blood Updated:  04/14/18 0650     Protime 12.3 Seconds      INR 0.93    Hemoglobin A1c [[id number]]  (Abnormal) Collected:  04/14/18 0613    Specimen:  Blood Updated:  04/14/18 0649     Hemoglobin A1C 11.30 (H) %     Narrative:       Hemoglobin A1C Ranges:    Increased Risk for Diabetes  5.7% to 6.4%  Diabetes                     >= 6.5%  Diabetic Goal                < 7.0%    CBC Auto Differential [[id number]]  (Abnormal) Collected:  04/14/18 0613    Specimen:  Blood Updated:  04/14/18 0649     WBC 8.24 10*3/mm3      RBC 5.33 10*6/mm3      Hemoglobin 16.5 g/dL      Hematocrit 49.1 %      MCV 92.1 fL      MCH 31.0 pg      MCHC 33.6 g/dL      RDW 13.7 %      RDW-SD 45.9 fl      MPV 11.7 fL      Platelets 159 10*3/mm3      Neutrophil % 48.7 %      Lymphocyte % 38.5 %      Monocyte % 8.0 %      Eosinophil % 3.9 %      Basophil % 0.4 %      Immature Grans % 0.5 %      Neutrophils, Absolute 4.02 10*3/mm3      Lymphocytes, Absolute 3.17 10*3/mm3      Monocytes, Absolute 0.66 10*3/mm3      Eosinophils, Absolute 0.32 10*3/mm3      Basophils, Absolute 0.03 10*3/mm3      Immature Grans, Absolute 0.04 (H) 10*3/mm3     POC Glucose Once [[id number]]  (Abnormal) Collected:  04/14/18 0625    Specimen:  Blood Updated:  04/14/18 0627     Glucose 211 (H) mg/dL     Narrative:       Meter: [id number] : [id number] Dayne LOPEZ    Troponin [[id number]]  (Normal) Collected:  04/14/18 0001    Specimen:  Blood Updated:  04/14/18 0044     Troponin T <0.010 ng/mL     Narrative:       Troponin T Reference Ranges:  Less than 0.03 ng/mL:    Negative for AMI  0.03 to 0.09 ng/mL:      Indeterminant for AMI  Greater than 0.09 ng/mL: Positive for AMI    POC Glucose Once [[id number]]  (Abnormal) Collected:  04/13/18 2344    Specimen:  Blood Updated:  04/13/18 2345     Glucose 295 (H) mg/dL     Narrative:        Meter: [id number] : [id number] Dayne LOPEZ    Troponin [[id number]]  (Normal) Collected:  04/13/18 1941    Specimen:  Blood Updated:  04/13/18 2015     Troponin T <0.010 ng/mL     Narrative:       Troponin T Reference Ranges:  Less than 0.03 ng/mL:    Negative for AMI  0.03 to 0.09 ng/mL:      Indeterminant for AMI  Greater than 0.09 ng/mL: Positive for AMI    BNP [[id number]]  (Normal) Collected:  04/13/18 1122    Specimen:  Blood Updated:  04/13/18 1319     proBNP 31.8 pg/mL     Narrative:       Among patients with dyspnea, NT-proBNP is highly sensitive for the detection of acute congestive heart failure. In addition NT-proBNP of <300 pg/ml effectively rules out acute congestive heart failure with 99% negative predictive value.    Comprehensive Metabolic Panel [[id number]]  (Abnormal) Collected:  04/13/18 1122    Specimen:  Blood Updated:  04/13/18 1158     Glucose 292 (H) mg/dL      BUN 13 mg/dL      Creatinine 0.56 (L) mg/dL      Sodium 137 mmol/L      Potassium 3.9 mmol/L      Chloride 101 mmol/L      CO2 25.1 mmol/L      Calcium 8.5 (L) mg/dL      Total Protein 6.6 g/dL      Albumin 3.70 g/dL      ALT (SGPT) 20 U/L      AST (SGOT) 17 U/L      Comment: Specimen hemolyzed.  Results may be affected.        Alkaline Phosphatase 155 (H) U/L      Total Bilirubin 0.3 mg/dL      eGFR Non African Amer 148 mL/min/1.73      Globulin 2.9 gm/dL      A/G Ratio 1.3 g/dL      BUN/Creatinine Ratio 23.2     Anion Gap 10.9 mmol/L     Troponin [[id number]]  (Normal) Collected:  04/13/18 1122    Specimen:  Blood Updated:  04/13/18 1157     Troponin T <0.010 ng/mL     Narrative:       Troponin T Reference Ranges:  Less than 0.03 ng/mL:    Negative for AMI  0.03 to 0.09 ng/mL:      Indeterminant for AMI  Greater than 0.09 ng/mL: Positive for AMI    CBC & Differential [[id number]] Collected:  04/13/18 1122    Specimen:  Blood Updated:  04/13/18 1143    Narrative:       The following orders were created for panel order CBC &  Differential.  Procedure                               Abnormality         Status                     ---------                               -----------         ------                     CBC Auto Differential[[id number]]        Normal              Final result                 Please view results for these tests on the individual orders.    CBC Auto Differential [[id number]]  (Normal) Collected:  04/13/18 1122    Specimen:  Blood Updated:  04/13/18 1143     WBC 6.75 10*3/mm3      RBC 5.46 10*6/mm3      Hemoglobin 16.6 g/dL      Hematocrit 50.8 %      MCV 93.0 fL      MCH 30.4 pg      MCHC 32.7 g/dL      RDW 13.6 %      RDW-SD 45.4 fl      MPV 11.6 fL      Platelets 149 10*3/mm3      Neutrophil % 48.0 %      Lymphocyte % 37.9 %      Monocyte % 8.9 %      Eosinophil % 4.4 %      Basophil % 0.4 %      Immature Grans % 0.4 %      Neutrophils, Absolute 3.23 10*3/mm3      Lymphocytes, Absolute 2.56 10*3/mm3      Monocytes, Absolute 0.60 10*3/mm3      Eosinophils, Absolute 0.30 10*3/mm3      Basophils, Absolute 0.03 10*3/mm3      Immature Grans, Absolute 0.03 10*3/mm3           .  Imaging Results (last 24 hours)     Procedure Component Value Units Date/Time    MRI Brain Without Contrast [[id number]] Collected:  04/13/18 1751     Updated:  04/13/18 1758    Narrative:       MRI BRAIN WO CONTRAST-     INDICATIONS: Dizziness     TECHNIQUE: Diffusion-weighted (only) imaging of the brain (patient was  unable to tolerate additional imaging, limiting the assessment)     FINDINGS:     The diffusion-weighted images, in correlation with ADC mapping, show no  restricted diffusion to suggest acute infarct. The exam is limited, as  described above. Follow-up/further evaluation can be obtained as  indications persist.       Impression:          As described.                 This report was finalized on 4/13/2018 5:55 PM by Dr. Jose Mcdonald MD.       CT Head Without Contrast [[id number]] Collected:  04/13/18 1228     Updated:   04/13/18 1316    Narrative:       CT SCAN HEAD WITHOUT CONTRAST     CLINICAL HISTORY: Intermittent episodes of lightheadedness.     CT scan of the head was obtained with 3 mm axial images. No intravenous  contrast was administered.     FINDINGS:     The ventricles, sulci, and cisterns are age appropriate. The basal  ganglia and thalami are unremarkable in appearance. The posterior fossa  structures are within normal limits.       Impression:          No evidence for acute intracranial pathology. If there remains clinical  concern for a CT occult infarct, further evaluation could be performed  with MR imaging.     These findings and recommendations were directly discussed with Patsy Shay of the emergency room staff on 04/13/2018 at approximately 12:19  PM.     Radiation dose reduction techniques were utilized, including automated  exposure control and exposure modulation based on body size.     This report was finalized on 4/13/2018 1:13 PM by Dr. Allan Carnes MD.       XR Chest 2 View [[id number]] Collected:  04/13/18 1139     Updated:  04/13/18 1143    Narrative:       PA AND LATERAL CHEST     CLINICAL HISTORY: Exertional chest pain.     The lungs are somewhat poorly inflated. There is mild bibasilar  atelectasis. There are no acute focal infiltrates. There are no pleural  effusions. A few calcified granulomas are noted. The heart is normal in  size.     IMPRESSIONS: No evidence of acute disease within the chest.     This report was finalized on 4/13/2018 11:40 AM by Dr. George Jim MD.             For this problem, the following was ordered:  Orders Placed This Encounter   Procedures   • XR Chest 2 View   • CT Head Without Contrast   • MRI Angiogram Head Without Contrast   • MRI Angiogram Neck With & Without Contrast   • MRI Brain Without Contrast   • Comprehensive Metabolic Panel   • Troponin   • CBC Auto Differential   • BNP   • Hemoglobin A1c   • Lipid Panel   • Comprehensive Metabolic Panel   • CBC  Auto Differential   • Protime-INR   • TSH   • Troponin   • Potassium   • Magnesium   • Troponin   • Digoxin Level   • Blood Gas, Arterial   • LDL Cholesterol, Direct   • Diet Regular; Cardiac   • Elevate Head of Bed   • Turn Patient   • Order CT Head Without Contrast for Neurological Decline   • Place Compression Stockings (TEDs)   • Remove & Replace Compression Stockings (TEDS) Daily   • Provide Stroke Education Material   • Nursing Dysphagia Screening (Complete Prior to Giving Anything By Mouth)   • RN to Place Order SLP Consult - Eval & Treat Choosing Reason of RN Dysphagia Screen Failed   • Nurse to Call MD or Nutrition Services for Diet if Patient Passes Dysphagia Screen   • Notify Provider   • Place Compression Stockings (TEDs)   • Remove & Replace Compression Stockings (TEDS) Daily   • Place Sequential Compression Device   • Maintain Sequential Compression Device   • Vital Signs   • Neuro Checks   • Intake and Output   • Saline Lock   • Continuous Cardiac Monitoring   • Continuous Pulse Oximetry   • May Be Off Telemetry for Tests   • ACLS Protocol For Life Threatening Dysrhythmias (If No DNR Order)   • Notify Provider if ACLS Protocol Activated   • Do NOT Hold Basal Insulin When Patient is NPO, Hold Bolus Dose if NPO   • Follow [facility] Hypoglycemia Protocol For Blood Glucose Less Than 70 mg/dL   • Hypoglycemia Treatment - Alert Patient That is Not NPO and Can Safely Swallow   • Hypoglycemia Treatment - Patient Has IV Access - Unresponsive, NPO or Unable To Safely Swallow   • Hypoglycemia Treatment - Patient Without IV Access - Unresponsive, NPO or Unable To Safely Swallow   • Notify Provider of event. Communicate needs and document in EMR.   • Document event and patients response to treatment in EMR, any medications given to be documented on MAR.   • Full Code   • [facility] (on-call MD unless specified)   • Cardiology (on-call MD unless specified)   • Inpatient Neuro Clinical Specialist Consult   • Inpatient Rehab  Admission Consult   • Inpatient Case Management  Consult   • Inpatient Diabetes Educator Consult   • Inpatient Cardiology Consult   • Inpatient Neurology Consult   • OT Consult: Eval & Treat Stroke Patient   • PT Consult: Eval & Treat   • Oxygen Therapy-   • Oxygen Therapy- Nasal Cannula; Titrate for SPO2: 90%, equal to or greater than   • SLP Consult: Eval & Treat   • POC Glucose 4x Daily AC & at Bedtime   • POC Glucose Once   • POC Glucose Once   • ECG 12 Lead   • ECG 12 Lead   • ECG 12 Lead   • Adult Transthoracic Echo Complete W/ Cont if Necessary Per Protocol (With Agitated Saline)   • Insert Peripheral IV   • Inpatient Admission   • CBC & Differential       Problem List Items Addressed This Visit     * (Principal)Exertional chest pain - Primary      Other Visit Diagnoses     Transient cerebral ischemia, unspecified type              ASSESSMENT/PLAN: This is a 62 y.o. male who has   Past Medical History:   Diagnosis Date   • Coronary artery disease    • Hyperlipidemia    • Hypertension    • Kidney stone     presents with 1 episode of speech difficulty and right face and arm numbness which resolved within 5 minutes.  This happened 1 week back.    1.  Left hemispheric TIA due to uncontrolled risk factors including uncontrolled diabetes and hypertension and dyslipidemia.  - Stroke labs; B12, TSH, lipid and A1c.  A1c 11.3, TSH 2.7  B12 in the 400s  - MRI brain: No acute stroke  - CTA head and neck ordered  - Telemetry   - PTOT speech rehabilitation assessment  - DVT prophylaxis  - Swallow study  - ZIO patch to rule out atrial fibrillation at discharge.  - Echocardiogram good ejection fraction normal LA no PFO.  - Dual Antiplatelet therapy and statins. (Patient was on aspirin at home)    2. At discharge Secondary stroke prophylaxis: Ideal targets for stroke prevention would be Blood pressure < 130/80; B12 > 500 TSH in normal range and LDL < 70; HbA1c < 6.5 and smoking cessation if  applicable.    Will follow with above results.     Thank you for allowing us to participate in the care of this patient.    Jack Gandhi MD  04/14/18  8:14 AM

## 2018-04-15 PROBLEM — I65.29 CAROTID STENOSIS: Status: ACTIVE | Noted: 2018-04-15

## 2018-04-15 LAB
ANION GAP SERPL CALCULATED.3IONS-SCNC: 15.5 MMOL/L
BUN BLD-MCNC: 11 MG/DL (ref 8–23)
BUN/CREAT SERPL: 14.9 (ref 7–25)
CALCIUM SPEC-SCNC: 9.3 MG/DL (ref 8.6–10.5)
CHLORIDE SERPL-SCNC: 99 MMOL/L (ref 98–107)
CO2 SERPL-SCNC: 25.5 MMOL/L (ref 22–29)
CREAT BLD-MCNC: 0.74 MG/DL (ref 0.76–1.27)
DEPRECATED RDW RBC AUTO: 46.3 FL (ref 37–54)
ERYTHROCYTE [DISTWIDTH] IN BLOOD BY AUTOMATED COUNT: 13.7 % (ref 11.5–14.5)
GFR SERPL CREATININE-BSD FRML MDRD: 107 ML/MIN/1.73
GLUCOSE BLD-MCNC: 208 MG/DL (ref 65–99)
GLUCOSE BLDC GLUCOMTR-MCNC: 204 MG/DL (ref 70–130)
GLUCOSE BLDC GLUCOMTR-MCNC: 209 MG/DL (ref 70–130)
GLUCOSE BLDC GLUCOMTR-MCNC: 257 MG/DL (ref 70–130)
GLUCOSE BLDC GLUCOMTR-MCNC: 335 MG/DL (ref 70–130)
HCT VFR BLD AUTO: 50.6 % (ref 40.4–52.2)
HGB BLD-MCNC: 16.5 G/DL (ref 13.7–17.6)
MCH RBC QN AUTO: 30.4 PG (ref 27–32.7)
MCHC RBC AUTO-ENTMCNC: 32.6 G/DL (ref 32.6–36.4)
MCV RBC AUTO: 93.2 FL (ref 79.8–96.2)
PLATELET # BLD AUTO: 160 10*3/MM3 (ref 140–500)
PMV BLD AUTO: 11.5 FL (ref 6–12)
POTASSIUM BLD-SCNC: 3.5 MMOL/L (ref 3.5–5.2)
RBC # BLD AUTO: 5.43 10*6/MM3 (ref 4.6–6)
SODIUM BLD-SCNC: 140 MMOL/L (ref 136–145)
WBC NRBC COR # BLD: 10.62 10*3/MM3 (ref 4.5–10.7)

## 2018-04-15 PROCEDURE — 80048 BASIC METABOLIC PNL TOTAL CA: CPT | Performed by: INTERNAL MEDICINE

## 2018-04-15 PROCEDURE — 63710000001 INSULIN DETEMER PER 5 UNITS: Performed by: INTERNAL MEDICINE

## 2018-04-15 PROCEDURE — 99233 SBSQ HOSP IP/OBS HIGH 50: CPT | Performed by: INTERNAL MEDICINE

## 2018-04-15 PROCEDURE — 82962 GLUCOSE BLOOD TEST: CPT

## 2018-04-15 PROCEDURE — 63710000001 INSULIN ASPART PER 5 UNITS: Performed by: INTERNAL MEDICINE

## 2018-04-15 PROCEDURE — 99233 SBSQ HOSP IP/OBS HIGH 50: CPT | Performed by: PSYCHIATRY & NEUROLOGY

## 2018-04-15 PROCEDURE — 85027 COMPLETE CBC AUTOMATED: CPT | Performed by: INTERNAL MEDICINE

## 2018-04-15 RX ORDER — VALSARTAN 160 MG/1
160 TABLET ORAL
Status: DISCONTINUED | OUTPATIENT
Start: 2018-04-16 | End: 2018-04-17

## 2018-04-15 RX ORDER — CARVEDILOL 12.5 MG/1
12.5 TABLET ORAL EVERY 12 HOURS SCHEDULED
Status: DISCONTINUED | OUTPATIENT
Start: 2018-04-15 | End: 2018-04-17

## 2018-04-15 RX ORDER — CARVEDILOL 6.25 MG/1
6.25 TABLET ORAL ONCE
Status: COMPLETED | OUTPATIENT
Start: 2018-04-15 | End: 2018-04-15

## 2018-04-15 RX ORDER — VALSARTAN 80 MG/1
80 TABLET ORAL ONCE
Status: COMPLETED | OUTPATIENT
Start: 2018-04-15 | End: 2018-04-15

## 2018-04-15 RX ADMIN — VALSARTAN 80 MG: 80 TABLET, FILM COATED ORAL at 10:57

## 2018-04-15 RX ADMIN — INSULIN DETEMIR 20 UNITS: 100 INJECTION, SOLUTION SUBCUTANEOUS at 21:43

## 2018-04-15 RX ADMIN — INSULIN ASPART 7 UNITS: 100 INJECTION, SOLUTION INTRAVENOUS; SUBCUTANEOUS at 12:52

## 2018-04-15 RX ADMIN — HYDROCODONE BITARTRATE AND ACETAMINOPHEN 1 TABLET: 5; 325 TABLET ORAL at 09:01

## 2018-04-15 RX ADMIN — ASPIRIN 81 MG: 81 TABLET ORAL at 09:01

## 2018-04-15 RX ADMIN — CARVEDILOL 6.25 MG: 6.25 TABLET, FILM COATED ORAL at 10:56

## 2018-04-15 RX ADMIN — ATORVASTATIN CALCIUM 80 MG: 80 TABLET, FILM COATED ORAL at 21:38

## 2018-04-15 RX ADMIN — CARVEDILOL 6.25 MG: 6.25 TABLET, FILM COATED ORAL at 09:01

## 2018-04-15 RX ADMIN — INSULIN ASPART 4 UNITS: 100 INJECTION, SOLUTION INTRAVENOUS; SUBCUTANEOUS at 09:01

## 2018-04-15 RX ADMIN — VALSARTAN 80 MG: 80 TABLET, FILM COATED ORAL at 09:01

## 2018-04-15 RX ADMIN — CLOPIDOGREL 75 MG: 75 TABLET, FILM COATED ORAL at 09:01

## 2018-04-15 RX ADMIN — Medication 1000 MCG: at 09:01

## 2018-04-15 RX ADMIN — CARVEDILOL 12.5 MG: 12.5 TABLET, FILM COATED ORAL at 21:38

## 2018-04-15 RX ADMIN — INSULIN ASPART 4 UNITS: 100 INJECTION, SOLUTION INTRAVENOUS; SUBCUTANEOUS at 18:25

## 2018-04-15 RX ADMIN — INSULIN ASPART 6 UNITS: 100 INJECTION, SOLUTION INTRAVENOUS; SUBCUTANEOUS at 21:43

## 2018-04-15 NOTE — PROGRESS NOTES
"Patient Name: Matt Cruz  :1955  62 y.o.      Patient Care Team:  No Known Provider as PCP - General    Interval History:   Medications adjusted    Subjective:  Following for chest pain     Objective   Vital Signs  Temp:  [97.8 °F (36.6 °C)-98.8 °F (37.1 °C)] 98.4 °F (36.9 °C)  Heart Rate:  [74-78] 76  Resp:  [18-20] 20  BP: (150-163)/(62-78) 163/75  No intake or output data in the 24 hours ending 04/15/18 0903  Flowsheet Rows    Flowsheet Row First Filed Value   Admission Height 170.2 cm (67\") Documented at 2018 1020   Admission Weight 97.5 kg (215 lb) Documented at 2018 1020          Physical Exam:   General Appearance:    Alert, cooperative, in no acute distress   Lungs:     Clear to auscultation.  Normal respiratory effort and rate.      Heart:    Regular rhythm and normal rate, normal S1 and S2, no murmurs, gallops or rubs.     Chest Wall:    No abnormalities observed   Abdomen:     Soft, nontender, positive bowel sounds.     Extremities:   no cyanosis, clubbing or edema.  No marked joint deformities.  Adequate musculoskeletal strength.       Results Review:      Results from last 7 days  Lab Units 04/15/18  0610   SODIUM mmol/L 140   POTASSIUM mmol/L 3.5   CHLORIDE mmol/L 99   CO2 mmol/L 25.5   BUN mg/dL 11   CREATININE mg/dL 0.74*   GLUCOSE mg/dL 208*   CALCIUM mg/dL 9.3       Results from last 7 days  Lab Units 18  0613 18  0001 18  1941   TROPONIN T ng/mL <0.010 <0.010 <0.010       Results from last 7 days  Lab Units 04/15/18  0610   WBC 10*3/mm3 10.62   HEMOGLOBIN g/dL 16.5   HEMATOCRIT % 50.6   PLATELETS 10*3/mm3 160       Results from last 7 days  Lab Units 18  0613   INR  0.93       Results from last 7 days  Lab Units 18  0613   CHOLESTEROL mg/dL 217*           Results from last 7 days  Lab Units 18  0613   CHOLESTEROL mg/dL 217*   TRIGLYCERIDES mg/dL 430*   HDL CHOL mg/dL 31*   LDL CHOL mg/dL 134*         Medication Review:     aspirin 81 mg " Oral Daily   atorvastatin 80 mg Oral Nightly   carvedilol 6.25 mg Oral Q12H   clopidogrel 75 mg Oral Daily   insulin aspart 0-9 Units Subcutaneous 4x Daily With Meals & Nightly   insulin detemir 15 Units Subcutaneous Nightly   valsartan 80 mg Oral Q24H   vitamin B-12 1,000 mcg Oral Daily             Assessment/Plan     1.  Chest pain. Plan for heart catheterization on Monday.  Echo with normal LV systolic function.  Ejection fraction 57%.  No valvular heart disease.  2.  Hypertension.   blood pressure is better but still not controlled.  Increase Coreg and valsartan  3.   Diabetes.  Poorly controlled.  Hemoglobin A1c 11.3.  4.  Strokelike symptoms.   MRI was limited.  No acute stroke noted    5.  Obesity  6.  Family history of premature coronary disease in his brother who  of a heart attack at a young age.       Ashley Alba MD, [facility]  04/15/18  9:03 AM

## 2018-04-15 NOTE — PLAN OF CARE
Problem: Patient Care Overview  Goal: Interprofessional Rounds/Family Conf  Outcome: Ongoing (interventions implemented as appropriate)      Problem: Stroke (Ischemic) (Adult)  Goal: Signs and Symptoms of Listed Potential Problems Will be Absent, Minimized or Managed (Stroke)  Outcome: Ongoing (interventions implemented as appropriate)      Problem: Patient Care Overview  Goal: Plan of Care Review  Outcome: Ongoing (interventions implemented as appropriate)   04/15/18 0458   Coping/Psychosocial   Plan of Care Reviewed With patient   Plan of Care Review   Progress improving   OTHER   Outcome Summary A+O X 4 w/o any c/o pain or discomfort. NIH 0. Currently on Plavix, ASA, and Lipitor. Ambulated around nurses' station as per PT pt stated, no SOA present. Sleeping w/o SOA at rest.

## 2018-04-15 NOTE — PROGRESS NOTES
Neurology Progress Note      Chief Complaint:  Right facial and arm numbness    Subjective     Subjective:  Overnight no issues    Medications:  Current Facility-Administered Medications   Medication Dose Route Frequency Provider Last Rate Last Dose   • acetaminophen (TYLENOL) tablet 650 mg  650 mg Oral Q4H PRN Jaden Aguilar MD       • aspirin EC tablet 81 mg  81 mg Oral Daily Jack Gandhi MD   81 mg at 04/15/18 0901   • atorvastatin (LIPITOR) tablet 80 mg  80 mg Oral Nightly Jaden Aguilar MD   80 mg at 04/14/18 2045   • bisacodyl (DULCOLAX) suppository 10 mg  10 mg Rectal Daily PRN Jaden Aguilar MD       • carvedilol (COREG) tablet 12.5 mg  12.5 mg Oral Q12H Ashley Alba MD       • carvedilol (COREG) tablet 6.25 mg  6.25 mg Oral Once Ashley Alba MD       • clopidogrel (PLAVIX) tablet 75 mg  75 mg Oral Daily Jack Gandhi MD   75 mg at 04/15/18 0901   • dextrose (D50W) solution 25 g  25 g Intravenous Q15 Min PRN Jaden Aguilar MD       • dextrose (GLUTOSE) oral gel 15 g  15 g Oral Q15 Min PRN Jaden Aguilar MD       • docusate sodium (COLACE) capsule 100 mg  100 mg Oral BID PRN Jaden Aguilar MD       • glucagon (human recombinant) (GLUCAGEN DIAGNOSTIC) injection 1 mg  1 mg Subcutaneous PRN Jaden Aguilar MD       • hydrALAZINE (APRESOLINE) injection 20 mg  20 mg Intravenous Q6H PRN Jaden Aguilar MD       • HYDROcodone-acetaminophen (NORCO) 5-325 MG per tablet 1 tablet  1 tablet Oral Q4H PRN Jaden Aguilar MD   1 tablet at 04/15/18 0901   • insulin aspart (novoLOG) injection 0-9 Units  0-9 Units Subcutaneous 4x Daily With Meals & Nightly Jaden Aguilar MD   4 Units at 04/15/18 0901   • insulin detemir (LEVEMIR) injection 15 Units  15 Units Subcutaneous Nightly Jaden Aguilar MD       • nitroglycerin (NITROSTAT) SL tablet 0.4 mg  0.4 mg Sublingual Q5 Min PRN Jaden Aguilar MD       • ondansetron (ZOFRAN) tablet 4 mg  4 mg Oral Q6H PRN Jaden CELAYA  MD Jeff        Or   • ondansetron ODT (ZOFRAN-ODT) disintegrating tablet 4 mg  4 mg Oral Q6H PRN Jaden Aguilar MD        Or   • ondansetron (ZOFRAN) injection 4 mg  4 mg Intravenous Q6H PRN Jaden Aguilar MD       • sodium chloride 0.9 % flush 1-10 mL  1-10 mL Intravenous PRN Jaden Aguilar MD       • [START ON 4/16/2018] valsartan (DIOVAN) tablet 160 mg  160 mg Oral Q24H Ashley Alba MD       • valsartan (DIOVAN) tablet 80 mg  80 mg Oral Once Ashley Alba MD       • vitamin B-12 (CYANOCOBALAMIN) tablet 1,000 mcg  1,000 mcg Oral Daily Jack Gandhi MD   1,000 mcg at 04/15/18 0901       Review of Systems:   -A 14 point review of systems is completed and is negative except for Above      Objective      Vital Signs  Temp:  [97.8 °F (36.6 °C)-98.8 °F (37.1 °C)] 98.4 °F (36.9 °C)  Heart Rate:  [74-78] 76  Resp:  [18-20] 20  BP: (150-163)/(62-78) 163/75    Physical Exam:    HEENT:  Neck supple  CVS:  Regular rate and rhythm.  No murmurs    Neurologic Exam:    -Awake, Alert, Oriented X 3  -No word finding difficulties  -No aphasia  -No dysarthria  -Follows simple and complex commands    Cranial nerves II through XII intact.    Motor: (strength out of 5:  1= minimal movement, 2 = movement in plane of gravity, 3 = movement against gravity, 4 = movement against some resistance, 5 = full strength)    -Right Upper Ext: Proximal: 5 Distal: 5  -Left Upper Ext: Proximal: 5 Distal: 5    -Right Lower Ext: Proximal: 5 Distal: 5  -Left Lower Ext: Proximal: 5 Distal: 5    DTR:  1+ throughout in all four extremities    Sensory:  -Intact to light touch, pinprick, temperature, pain, and proprioception    Coordination/Gait:  -No ataxia     Results Review:    I reviewed the patient's new clinical results.        Assessment/Plan     Hospital Problem List    Principal Problem:    Exertional chest pain  Active Problems:    Essential hypertension    TIA (transient ischemic attack)    Type 2 diabetes mellitus with  other specified complication    HLD (hyperlipidemia)    Impression:  1.  Left hemispheric TIA due to uncontrolled risk factors including uncontrolled diabetes and hypertension and dyslipidemia.  - Stroke labs; B12, TSH, lipid and A1c.  A1c 11.3, TSH 2.7  B12 in the 400s  - MRI brain: No acute stroke  - CTA head and neck left PCA stenosis, right ICA 50% stenosis  - ZIO patch to rule out atrial fibrillation at discharge.  - Dual Antiplatelet therapy and statins for intracranial and extracranial atherosclerosis. (Patient was on aspirin at home)  - Vascular consult for right ICA stenosis to keep close watch as outpatient.  Medical management for now.     2. At discharge Secondary stroke prophylaxis: Ideal targets for stroke prevention would be Blood pressure < 130/80; B12 > 500 TSH in normal range and LDL < 70; HbA1c < 6.5 and smoking cessation if applicable.    Plan:  Patient can be discharged from neurological standpoint.          Jack Gandhi MD  04/15/18  9:12 AM

## 2018-04-15 NOTE — PLAN OF CARE
Problem: Patient Care Overview  Goal: Plan of Care Review  Outcome: Ongoing (interventions implemented as appropriate)   04/15/18 1606   Coping/Psychosocial   Plan of Care Reviewed With patient   Plan of Care Review   Progress improving   OTHER   Outcome Summary Patient up in chair most of the shift, walking without asst. No c/o chest pain, consent signed for cardiac cath in am. BP meds adjusted this shift. Some teaching completed about insulin and diabetes.      Goal: Individualization and Mutuality  Outcome: Ongoing (interventions implemented as appropriate)    Goal: Discharge Needs Assessment  Outcome: Ongoing (interventions implemented as appropriate)      Problem: Stroke (Ischemic) (Adult)  Goal: Signs and Symptoms of Listed Potential Problems Will be Absent, Minimized or Managed (Stroke)  Outcome: Ongoing (interventions implemented as appropriate)      Problem: Patient Care Overview  Goal: Plan of Care Review  Outcome: Outcome(s) achieved Date Met: 04/15/18    Goal: Individualization and Mutuality  Outcome: Outcome(s) achieved Date Met: 04/15/18    Goal: Discharge Needs Assessment  Outcome: Outcome(s) achieved Date Met: 04/15/18    Goal: Interprofessional Rounds/Family Conf  Outcome: Outcome(s) achieved Date Met: 04/15/18

## 2018-04-15 NOTE — PROGRESS NOTES
Name: Matt Cruz ADMIT: 2018   : 1955  PCP: No Known Provider    MRN: [id number] LOS: 2 days   AGE/SEX: 62 y.o. male  ROOM: [facility]   Subjective   No CP SOA NVD. No weakness or numbness reported. No vision change or HA.    Objective   Vital Signs  Temp:  [97.8 °F (36.6 °C)-98.8 °F (37.1 °C)] 98.4 °F (36.9 °C)  Heart Rate:  [74-78] 76  Resp:  [18-20] 20  BP: (150-163)/(62-78) 163/75  SpO2:  [91 %-96 %] 91 %  on  Flow (L/min):  [2-3] 2;   Device (Oxygen Therapy): room air  Body mass index is 33.97 kg/m².    Physical Exam   Constitutional: He is oriented to person, place, and time. He appears well-developed. No distress.   HENT:   Head: Normocephalic and atraumatic.   Eyes: Conjunctivae and EOM are normal.   Neck: Normal range of motion. Neck supple.   Cardiovascular: Normal rate, regular rhythm and intact distal pulses.    Pulmonary/Chest: Effort normal and breath sounds normal. He has no wheezes.   Abdominal: Soft. He exhibits no distension.   Musculoskeletal: Normal range of motion. He exhibits no edema.   Neurological: He is alert and oriented to person, place, and time.   Skin: Skin is warm and dry. He is not diaphoretic.   Psychiatric: He has a normal mood and affect. His behavior is normal.   Nursing note and vitals reviewed.      Results Review:       I reviewed the patient's new clinical results. Reviewed imaging, agree with interpretation. Reviewed telemetry, sinus rhythm.    Results from last 7 days  Lab Units 04/15/18  0610 18  0613 18  1122   WBC 10*3/mm3 10.62 8.24 6.75   HEMOGLOBIN g/dL 16.5 16.5 16.6   PLATELETS 10*3/mm3 160 159 149     Results from last 7 days  Lab Units 04/15/18  0610 18  0613 18  1122   SODIUM mmol/L 140 138 137   POTASSIUM mmol/L 3.5 3.7 3.9   CHLORIDE mmol/L 99 101 101   CO2 mmol/L 25.5 23.1 25.1   BUN mg/dL 11 13 13   CREATININE mg/dL 0.74* 0.53* 0.56*   GLUCOSE mg/dL 208* 247* 292*   Estimated Creatinine Clearance: 115.7 mL/min (by C-G  formula based on SCr of 0.74 mg/dL (L)).  Results from last 7 days  Lab Units 04/15/18  0610 04/14/18  0613 04/13/18  1122   CALCIUM mg/dL 9.3 8.7 8.5*   ALBUMIN g/dL  --  3.60 3.70         aspirin 81 mg Oral Daily   atorvastatin 80 mg Oral Nightly   carvedilol 12.5 mg Oral Q12H   carvedilol 6.25 mg Oral Once   clopidogrel 75 mg Oral Daily   insulin aspart 0-9 Units Subcutaneous 4x Daily With Meals & Nightly   insulin detemir 20 Units Subcutaneous Nightly   [START ON 4/16/2018] valsartan 160 mg Oral Q24H   valsartan 80 mg Oral Once   vitamin B-12 1,000 mcg Oral Daily      Diet Regular; Cardiac, Consistent Carbohydrate  NPO Diet NPO Except: Sips With Meds      Assessment/Plan      Active Hospital Problems (** Indicates Principal Problem)    Diagnosis Date Noted   • **Exertional chest pain [R07.9] 04/13/2018   • Carotid stenosis [I65.29] 04/15/2018   • Essential hypertension [I10] 04/13/2018   • TIA (transient ischemic attack) [G45.9] 04/13/2018   • Type 2 diabetes mellitus with other specified complication [E11.69] 04/13/2018   • HLD (hyperlipidemia) [E78.5] 04/13/2018      Resolved Hospital Problems    Diagnosis Date Noted Date Resolved   No resolved problems to display.     - CP: Cath planned tomorrow. ASA/Plavix/Coreg/Atorvastatin. NTG PRN. Cardiology following.  - HTN: Coreg, Valsartan. Hydralazine PRN. Increased doses today.  - DM2: A1c 11.3. Needed 20 units insulin yesterday. Increase levemir. Monitor with SSI.  - TIA: ASA/Plavix/Statin. No acute stroke on MRI.  - Carotid Stenosis: Consult Vascular.  - Thyroid Nodule: TSH normal.  - Disposition: Home/possibly Tuesday    Discussed with Dr Gandhi.    Jaden Aguilar MD  [facility]  04/15/18  10:53 AM

## 2018-04-16 LAB
ANION GAP SERPL CALCULATED.3IONS-SCNC: 13.7 MMOL/L
BUN BLD-MCNC: 14 MG/DL (ref 8–23)
BUN/CREAT SERPL: 23.3 (ref 7–25)
CALCIUM SPEC-SCNC: 9.2 MG/DL (ref 8.6–10.5)
CHLORIDE SERPL-SCNC: 100 MMOL/L (ref 98–107)
CO2 SERPL-SCNC: 24.3 MMOL/L (ref 22–29)
CREAT BLD-MCNC: 0.6 MG/DL (ref 0.76–1.27)
DEPRECATED RDW RBC AUTO: 46.9 FL (ref 37–54)
ERYTHROCYTE [DISTWIDTH] IN BLOOD BY AUTOMATED COUNT: 13.6 % (ref 11.5–14.5)
GFR SERPL CREATININE-BSD FRML MDRD: 137 ML/MIN/1.73
GLUCOSE BLD-MCNC: 211 MG/DL (ref 65–99)
GLUCOSE BLDC GLUCOMTR-MCNC: 206 MG/DL (ref 70–130)
GLUCOSE BLDC GLUCOMTR-MCNC: 219 MG/DL (ref 70–130)
GLUCOSE BLDC GLUCOMTR-MCNC: 227 MG/DL (ref 70–130)
GLUCOSE BLDC GLUCOMTR-MCNC: 236 MG/DL (ref 70–130)
HCT VFR BLD AUTO: 52.4 % (ref 40.4–52.2)
HGB BLD-MCNC: 16.9 G/DL (ref 13.7–17.6)
INR PPP: 0.95 (ref 0.9–1.1)
MCH RBC QN AUTO: 30.6 PG (ref 27–32.7)
MCHC RBC AUTO-ENTMCNC: 32.3 G/DL (ref 32.6–36.4)
MCV RBC AUTO: 94.9 FL (ref 79.8–96.2)
PLATELET # BLD AUTO: 154 10*3/MM3 (ref 140–500)
PMV BLD AUTO: 11.6 FL (ref 6–12)
POTASSIUM BLD-SCNC: 3.8 MMOL/L (ref 3.5–5.2)
PROTHROMBIN TIME: 12.5 SECONDS (ref 11.7–14.2)
RBC # BLD AUTO: 5.52 10*6/MM3 (ref 4.6–6)
SODIUM BLD-SCNC: 138 MMOL/L (ref 136–145)
WBC NRBC COR # BLD: 10.66 10*3/MM3 (ref 4.5–10.7)

## 2018-04-16 PROCEDURE — 0 IOPAMIDOL PER 1 ML: Performed by: INTERNAL MEDICINE

## 2018-04-16 PROCEDURE — C9600 PERC DRUG-EL COR STENT SING: HCPCS | Performed by: INTERNAL MEDICINE

## 2018-04-16 PROCEDURE — 25010000002 HEPARIN (PORCINE) PER 1000 UNITS: Performed by: INTERNAL MEDICINE

## 2018-04-16 PROCEDURE — 85027 COMPLETE CBC AUTOMATED: CPT | Performed by: INTERNAL MEDICINE

## 2018-04-16 PROCEDURE — 82962 GLUCOSE BLOOD TEST: CPT

## 2018-04-16 PROCEDURE — C1874 STENT, COATED/COV W/DEL SYS: HCPCS | Performed by: INTERNAL MEDICINE

## 2018-04-16 PROCEDURE — 99152 MOD SED SAME PHYS/QHP 5/>YRS: CPT | Performed by: INTERNAL MEDICINE

## 2018-04-16 PROCEDURE — 80048 BASIC METABOLIC PNL TOTAL CA: CPT | Performed by: INTERNAL MEDICINE

## 2018-04-16 PROCEDURE — 63710000001 INSULIN ASPART PER 5 UNITS: Performed by: INTERNAL MEDICINE

## 2018-04-16 PROCEDURE — C1887 CATHETER, GUIDING: HCPCS | Performed by: INTERNAL MEDICINE

## 2018-04-16 PROCEDURE — 85347 COAGULATION TIME ACTIVATED: CPT

## 2018-04-16 PROCEDURE — 99233 SBSQ HOSP IP/OBS HIGH 50: CPT | Performed by: INTERNAL MEDICINE

## 2018-04-16 PROCEDURE — 25010000002 FENTANYL CITRATE (PF) 100 MCG/2ML SOLUTION: Performed by: INTERNAL MEDICINE

## 2018-04-16 PROCEDURE — 85610 PROTHROMBIN TIME: CPT | Performed by: INTERNAL MEDICINE

## 2018-04-16 PROCEDURE — C1894 INTRO/SHEATH, NON-LASER: HCPCS | Performed by: INTERNAL MEDICINE

## 2018-04-16 PROCEDURE — 93458 L HRT ARTERY/VENTRICLE ANGIO: CPT | Performed by: INTERNAL MEDICINE

## 2018-04-16 PROCEDURE — C1725 CATH, TRANSLUMIN NON-LASER: HCPCS | Performed by: INTERNAL MEDICINE

## 2018-04-16 PROCEDURE — B2111ZZ FLUOROSCOPY OF MULTIPLE CORONARY ARTERIES USING LOW OSMOLAR CONTRAST: ICD-10-PCS | Performed by: INTERNAL MEDICINE

## 2018-04-16 PROCEDURE — 99153 MOD SED SAME PHYS/QHP EA: CPT | Performed by: INTERNAL MEDICINE

## 2018-04-16 PROCEDURE — 92928 PRQ TCAT PLMT NTRAC ST 1 LES: CPT | Performed by: INTERNAL MEDICINE

## 2018-04-16 PROCEDURE — 99254 IP/OBS CNSLTJ NEW/EST MOD 60: CPT | Performed by: NEUROLOGICAL SURGERY

## 2018-04-16 PROCEDURE — 25010000002 MIDAZOLAM PER 1 MG: Performed by: INTERNAL MEDICINE

## 2018-04-16 PROCEDURE — C1769 GUIDE WIRE: HCPCS | Performed by: INTERNAL MEDICINE

## 2018-04-16 PROCEDURE — B2151ZZ FLUOROSCOPY OF LEFT HEART USING LOW OSMOLAR CONTRAST: ICD-10-PCS | Performed by: INTERNAL MEDICINE

## 2018-04-16 PROCEDURE — 4A023N7 MEASUREMENT OF CARDIAC SAMPLING AND PRESSURE, LEFT HEART, PERCUTANEOUS APPROACH: ICD-10-PCS | Performed by: INTERNAL MEDICINE

## 2018-04-16 PROCEDURE — 027034Z DILATION OF CORONARY ARTERY, ONE ARTERY WITH DRUG-ELUTING INTRALUMINAL DEVICE, PERCUTANEOUS APPROACH: ICD-10-PCS | Performed by: INTERNAL MEDICINE

## 2018-04-16 DEVICE — XIENCE ALPINE EVEROLIMUS ELUTING CORONARY STENT SYSTEM 3.00 MM X 12 MM / RAPID-EXCHANGE
Type: IMPLANTABLE DEVICE | Status: FUNCTIONAL
Brand: XIENCE ALPINE

## 2018-04-16 RX ORDER — SODIUM CHLORIDE 9 MG/ML
INJECTION, SOLUTION INTRAVENOUS CONTINUOUS PRN
Status: DISCONTINUED | OUTPATIENT
Start: 2018-04-16 | End: 2018-04-16 | Stop reason: HOSPADM

## 2018-04-16 RX ORDER — CLOPIDOGREL BISULFATE 75 MG/1
TABLET ORAL AS NEEDED
Status: DISCONTINUED | OUTPATIENT
Start: 2018-04-16 | End: 2018-04-16 | Stop reason: HOSPADM

## 2018-04-16 RX ORDER — FENTANYL CITRATE 50 UG/ML
INJECTION, SOLUTION INTRAMUSCULAR; INTRAVENOUS AS NEEDED
Status: DISCONTINUED | OUTPATIENT
Start: 2018-04-16 | End: 2018-04-16 | Stop reason: HOSPADM

## 2018-04-16 RX ORDER — LIDOCAINE HYDROCHLORIDE 20 MG/ML
INJECTION, SOLUTION INFILTRATION; PERINEURAL AS NEEDED
Status: DISCONTINUED | OUTPATIENT
Start: 2018-04-16 | End: 2018-04-16 | Stop reason: HOSPADM

## 2018-04-16 RX ORDER — MIDAZOLAM HYDROCHLORIDE 1 MG/ML
INJECTION INTRAMUSCULAR; INTRAVENOUS AS NEEDED
Status: DISCONTINUED | OUTPATIENT
Start: 2018-04-16 | End: 2018-04-16 | Stop reason: HOSPADM

## 2018-04-16 RX ORDER — HEPARIN SODIUM 1000 [USP'U]/ML
INJECTION, SOLUTION INTRAVENOUS; SUBCUTANEOUS AS NEEDED
Status: DISCONTINUED | OUTPATIENT
Start: 2018-04-16 | End: 2018-04-16 | Stop reason: HOSPADM

## 2018-04-16 RX ADMIN — ATORVASTATIN CALCIUM 80 MG: 80 TABLET, FILM COATED ORAL at 20:55

## 2018-04-16 RX ADMIN — Medication 1000 MCG: at 07:38

## 2018-04-16 RX ADMIN — CARVEDILOL 12.5 MG: 12.5 TABLET, FILM COATED ORAL at 20:55

## 2018-04-16 RX ADMIN — INSULIN ASPART 4 UNITS: 100 INJECTION, SOLUTION INTRAVENOUS; SUBCUTANEOUS at 20:55

## 2018-04-16 RX ADMIN — CLOPIDOGREL 75 MG: 75 TABLET, FILM COATED ORAL at 07:38

## 2018-04-16 RX ADMIN — INSULIN ASPART 4 UNITS: 100 INJECTION, SOLUTION INTRAVENOUS; SUBCUTANEOUS at 17:00

## 2018-04-16 RX ADMIN — VALSARTAN 160 MG: 160 TABLET ORAL at 07:38

## 2018-04-16 RX ADMIN — ACETAMINOPHEN 650 MG: 325 TABLET ORAL at 20:01

## 2018-04-16 RX ADMIN — CARVEDILOL 12.5 MG: 12.5 TABLET, FILM COATED ORAL at 07:38

## 2018-04-16 RX ADMIN — INSULIN DETEMIR 20 UNITS: 100 INJECTION, SOLUTION SUBCUTANEOUS at 20:56

## 2018-04-16 RX ADMIN — ASPIRIN 81 MG: 81 TABLET ORAL at 07:38

## 2018-04-16 NOTE — CONSULTS
Patient name: Matt Cruz  Referring Provider: Dr. Gandhi  Reason for Consultation: Intracranial stenosis    Patient Care Team:  No Known Provider as PCP - General    Chief complaint: Episode lasting about 5 minutes of right arm numbness and facial numbness with some speech difficulty.    Subjective .     History of present illness:    Patient is a 62 y.o. right handed male who presents with multiple stroke risk factors including dyslipidemia, hypertension, coronary artery disease.  He was admitted to hospital from a primary care physician's office because of accelerated hypertension.  This was associated with an episode lasting about 5 minutes where he describes he was unable to speak, he sent spelled his right face and right arm were numb.  This occurred about a week ago.    Patient has not been taking any particular medications on a routine basis at home.    At this time he denies numbness tingling weakness incontinence thought disorder, visual disturbances, or speech problems.    Review of Systems  Review of Systems   Constitutional: Negative for unexpected weight change.   HENT: Negative for nosebleeds.    Eyes: Negative for visual disturbance.   Respiratory: Negative for chest tightness.    Cardiovascular: Negative for palpitations.   Gastrointestinal: Negative for abdominal pain.   Endocrine: Negative for polyuria.   Genitourinary: Negative for decreased urine volume.   Musculoskeletal: Negative for back pain.   Allergic/Immunologic: Negative for immunocompromised state.   Neurological: Negative for seizures, facial asymmetry, speech difficulty, numbness and headaches.   Psychiatric/Behavioral: Negative for behavioral problems and confusion.       History  PAST MEDICAL HISTORY  Past Medical History:   Diagnosis Date   • Coronary artery disease    • Hyperlipidemia    • Hypertension    • Kidney stone        PAST SURGICAL HISTORY  Past Surgical History:   Procedure Laterality Date   • CORONARY ANGIOPLASTY WITH  STENT PLACEMENT     • KIDNEY STONE SURGERY         FAMILY HISTORY  Family History   Problem Relation Age of Onset   • Coronary artery disease Father 60   • Heart attack Brother 48            SOCIAL HISTORY  Social History   Substance Use Topics   • Smoking status: Former Smoker   • Smokeless tobacco: Not on file   • Alcohol use No       Employed      Allergies:  Review of patient's allergies indicates no known allergies.    MEDICATIONS:  Prescriptions Prior to Admission   Medication Sig Dispense Refill Last Dose   • aspirin 325 MG tablet Take 325 mg by mouth Daily.   2018 at Unknown time   • diphenhydrAMINE (BENADRYL) 25 mg capsule Take 25 mg by mouth At Night As Needed for Allergies.   Past Week at Unknown time       Objective     Results Review:  LABS:    Admission on 2018   No results displayed because visit has over 200 results.          DIAGNOSTICS:  CT head and CTA and limited MRI: CT head shows no hemorrhage.  CTA demonstrates about 50% stenosis of the distal petrous internal carotid artery on the right side.  There is also some attenuation of the posterior cerebral artery on the left side.  The posterior circulation overall is relatively small compared to the anterior circulation.    Results Review:   I reviewed the patient's new clinical results.  I personally viewed and interpreted the patient's imaging studies    Vital Signs   Temp:  [97.3 °F (36.3 °C)-98.6 °F (37 °C)] 97.3 °F (36.3 °C)  Heart Rate:  [69-85] 75  Resp:  [18] 18  BP: (123-149)/(60-80) 146/80    Physical Exam:  Physical Exam   Constitutional: He is oriented to person, place, and time. He appears well-developed and well-nourished. He is cooperative.   HENT:   Head: Normocephalic and atraumatic.   Eyes: EOM are normal. Pupils are equal, round, and reactive to light. No scleral icterus.   Neck: Normal range of motion. Neck supple.   Cardiovascular: Normal rate, regular rhythm and intact distal pulses.    No murmur  heard.  Pulmonary/Chest: Effort normal and breath sounds normal.   Abdominal: Soft. Bowel sounds are normal. There is no tenderness.   Musculoskeletal: Normal range of motion.   Neurological: He is alert and oriented to person, place, and time. He has normal strength. He displays no atrophy. No cranial nerve deficit or sensory deficit. He exhibits normal muscle tone. He has a normal Finger-Nose-Finger Test, a normal Heel to Shin Test, a normal Romberg Test and a normal Tandem Gait Test. He displays a negative Romberg sign. Gait normal. Coordination and gait normal. GCS eye subscore is 4. GCS verbal subscore is 5. GCS motor subscore is 6.   Reflex Scores:       Tricep reflexes are 2+ on the right side and 2+ on the left side.       Bicep reflexes are 2+ on the right side and 2+ on the left side.       Brachioradialis reflexes are 2+ on the right side and 2+ on the left side.       Patellar reflexes are 2+ on the right side and 2+ on the left side.       Achilles reflexes are 2+ on the right side and 2+ on the left side.  Negative Cruz and clonus  Finger to nose intact   Heel to shin intact  Able to tandem walk  Able to walk on heels and toes   Skin: Skin is warm, dry and intact.   Venous stasis changes in the lower extremities.   Psychiatric: He has a normal mood and affect. His speech is normal and behavior is normal. Judgment and thought content normal. Cognition and memory are normal.   Vitals reviewed.    Neurologic Exam     Mental Status   Oriented to person, place, and time.   Registration: recalls 3 of 3 objects. Recall at 5 minutes: recalls 3 of 3 objects. Follows 3 step commands.   Attention: normal. Concentration: normal.   Speech: speech is normal   Level of consciousness: alert  Knowledge: good.   Able to name object. Able to read. Able to repeat. Able to write. Normal comprehension.     Cranial Nerves     CN II   Visual fields full to confrontation.     CN III, IV, VI   Pupils are equal, round, and  reactive to light.  Extraocular motions are normal.   Right pupil: Consensual response: intact.   Left pupil: Consensual response: intact.   CN III: no CN III palsy  CN VI: no CN VI palsy  Nystagmus: none   Diplopia: none  Ophthalmoparesis: none  Upgaze: normal  Downgaze: normal  Conjugate gaze: present  Vestibulo-ocular reflex: present    CN V   Facial sensation intact.     CN VII   Facial expression full, symmetric.     CN VIII   CN VIII normal.     CN IX, X   CN IX normal.     CN XI   CN XI normal.     CN XII   CN XII normal.     Motor Exam   Muscle bulk: normal  Overall muscle tone: normal  Right arm tone: normal  Left arm tone: normal  Right arm pronator drift: absent  Left arm pronator drift: absent  Right leg tone: normal  Left leg tone: normal    Strength   Strength 5/5 throughout.   Right neck flexion: 5/5  Left neck flexion: 5/5  Right neck extension: 5/5  Left neck extension: 5/5  Right deltoid: 5/5  Left deltoid: 5/5  Right biceps: 5/5  Left biceps: 5/5  Right triceps: 5/5  Left triceps: 5/5  Right wrist flexion: 5/5  Left wrist flexion: 5/5  Right wrist extension: 5/5  Left wrist extension: 5/5  Right interossei: 5/5  Left interossei: 5/5  Right abdominals: 5/5  Left abdominals: 5/5  Right iliopsoas: 5/5  Left iliopsoas: 5/5  Right quadriceps: 5/5  Left quadriceps: 5/5  Right hamstrin/5  Left hamstrin/5  Right glutei: 5/5  Left glutei: 5/5  Right anterior tibial: 5/5  Left anterior tibial: 5/5  Right posterior tibial: 5/5  Left posterior tibial: 5/5  Right peroneal: 5/5  Left peroneal: 5/5  Right gastroc: 5/5  Left gastroc: 5/5    Sensory Exam   Light touch normal.   Vibration normal.   Proprioception normal.   Pinprick normal.     Gait, Coordination, and Reflexes     Gait  Gait: normal    Coordination   Romberg: negative  Finger to nose coordination: normal  Heel to shin coordination: normal  Tandem walking coordination: normal    Tremor   Resting tremor: absent  Intention tremor:  absent  Action tremor: absent    Reflexes   Right brachioradialis: 2+  Left brachioradialis: 2+  Right biceps: 2+  Left biceps: 2+  Right triceps: 2+  Left triceps: 2+  Right patellar: 2+  Left patellar: 2+  Right achilles: 2+  Left achilles: 2+  Right : 2+  Left : 2+  Right plantar: normal  Left plantar: normal  Right Cruz: absent  Left Cruz: absent  Right ankle clonus: absent  Left ankle clonus: absent      Assessment/Plan     Principal Problem:    Exertional chest pain  Active Problems:    Essential hypertension    TIA (transient ischemic attack)    Type 2 diabetes mellitus with other specified complication    HLD (hyperlipidemia)    Carotid stenosis    The patient has cerebral atherosclerosis.  There are no hemodynamically significant stenoses.  I do not think that the posterior cerebral artery stenosis on the left side is related to his episode and even if it were there is no intervention that would be appropriate to consider.  A 50% stenosis of the pre-cavernous internal carotid artery at the skull base is really of no significance except prognostically.        PLAN: I discussed with the patient healthy lifestyle.  I suggested some dietary changes, exercise, and excellent blood pressure control.    There are no neurosurgical or neurointerventional indications at this time.    I discussed the patients findings and my recommendations with patient, family and nursing staff    Gunnar Liu MD  04/16/18  7:56 AM

## 2018-04-16 NOTE — PLAN OF CARE
Problem: Patient Care Overview  Goal: Plan of Care Review  Outcome: Ongoing (interventions implemented as appropriate)      Problem: Stroke (Ischemic) (Adult)  Goal: Signs and Symptoms of Listed Potential Problems Will be Absent, Minimized or Managed (Stroke)  Outcome: Ongoing (interventions implemented as appropriate)

## 2018-04-16 NOTE — PLAN OF CARE
Problem: Patient Care Overview  Goal: Plan of Care Review   04/16/18 0505 04/16/18 0511   Coping/Psychosocial   Plan of Care Reviewed With --  patient   Coping/Psychosocial   Patient Agreement with Plan of Care --  agrees   Plan of Care Review   Progress --  improving   OTHER   Outcome Summary A+O X 4 w/o any c/o pain or discomfort. V/S WDL. Assisted w/ a shower tonight. w/o any complications. Up ad oswaldo, ambulated in room, and in hallways. Continue on ASA, and Lipitor. Cardiac cath today, NPO. Sleeping w/o SOA at rest. --

## 2018-04-16 NOTE — CONSULTS
"Diabetes Education  Assessment/Teaching    Patient Name:  Matt Cruz  YOB: 1955  MRN: [id number]  Admit Date:  4/13/2018      Assessment Date:  4/16/2018  Flowsheet Row Most Recent Value   General Information    Referral From: MD mahoney   Height 170.2 cm (67\")   Height Method Stated   Weight 101 kg (221 lb 9 oz)   Weight Method Bed scale   Pregnancy Assessment   Diabetes History   What type of diabetes do you have? Type 2   Length of Diabetes Diagnosis 6 - 10 years [Pt states was initially dx'd in 2010 and was prescribed metformin.  ]   Have you had diabetes education/teaching in the past? yes   When and where was your diabetes education? Pt states possibly attended one class of DSME at [facility] in 2010   Do you test your blood sugar at home? no [Pt states had tested BG in the past]   Education Preferences   Barriers to Learning other (comment) [None]   Nutrition Information   Assessment Topics   Healthy Eating - Assessment Needs education   Taking Medication - Assessment Needs education   Problem Solving - Assessment Needs education   Reducing Risk - Assessment Needs education   Monitoring - Assessment Needs education   DM Goals   Healthy Eating - Goal 0-30 days from discharge   Taking Medication - Goal 0-7 days from discharge   Problem Solving - Goal 0-30 days from discharge   Reducing Risk - Goal 30-90 days from discharge   Monitoring - Goal 0-7 days from discharge   Contact Plan Follow-up medical care          Flowsheet Row Most Recent Value   DM Education Needs   Meter Meter provided   Meter Type One Touch [Verio Flex w/ Delica lancets]   Frequency of Testing AC meals   Blood Glucose Target Range  mg/dL   Medication Insulin, Actions, Side effects, Administration, Pen [Levemir FlexTouch w/ 4 mm Lisa pen needles.  Pt states was on Metformin in the past.  ]   Problem Solving Hypoglycemia, Hyperglycemia, Signs, Symptoms, Treatment, Sick days   Reducing Risks A1C testing [A1c 11/3% w/ " target of less than 7%]   Healthy Eating RD consult   Healthy Coping Appropriate   Discharge Plan Home [Encouraged completion of DSME]   Motivation Engaged   Teaching Method Explanation, Discussion, Demonstration, Handouts, Teach back   Patient Response Verbalized understanding, Demonstrates adequately            Other Comments:          Electronically signed by:  Leticia Hernandez RN  04/16/18 12:52 PM

## 2018-04-16 NOTE — SIGNIFICANT NOTE
04/16/18 0831   Rehab Time/Intention   Evaluation Not Performed patient/family declined evaluation  (Pt states feels baseline function for mobility and adls. Denies need for OT. )   Rehab Treatment   Discipline occupational therapist

## 2018-04-16 NOTE — CONSULTS
"Diabetes Education  Assessment/Teaching    Patient Name:  Matt Cruz  YOB: 1955  MRN: [id number]  Admit Date:  4/13/2018      Assessment Date:  4/16/2018  Flowsheet Row Most Recent Value   General Information    Referral From: MD mahoney   Height 170.2 cm (67\")   Height Method Stated   Weight 101 kg (221 lb 9 oz)   Weight Method Bed scale   Pregnancy Assessment   Diabetes History   What type of diabetes do you have? Type 2   Length of Diabetes Diagnosis Newly diagnosed <6 months   Education Preferences   Barriers to Learning other (comment) [Pt prefers not to start education process as he is going for cath this AM.  Materials left at bedside and encouraged pt to start reading if possible.  ]   Nutrition Information   Assessment Topics   DM Goals          Flowsheet Row Most Recent Value   DM Education Needs   Patient Response Verbalized understanding [Above discussed w/ staff RN.  ]            Other Comments:         Electronically signed by:  Leticia Hernandez RN  04/16/18 9:48 AM  "

## 2018-04-16 NOTE — PROGRESS NOTES
"Patient Name: Matt Cruz  :1955  62 y.o.      Patient Care Team:  No Known Provider as PCP - General    Interval History:   Blood pressure is better.    Subjective:  Following for chest pain, hypertension     Objective   Vital Signs  Temp:  [97.3 °F (36.3 °C)-98.6 °F (37 °C)] 97.3 °F (36.3 °C)  Heart Rate:  [69-85] 75  Resp:  [18] 18  BP: (123-149)/(60-80) 146/80  No intake or output data in the 24 hours ending 18 0828  Flowsheet Rows    Flowsheet Row First Filed Value   Admission Height 170.2 cm (67\") Documented at 2018 1020   Admission Weight 97.5 kg (215 lb) Documented at 2018 1020          Physical Exam:   General Appearance:    Alert, cooperative, in no acute distress   Lungs:     Clear to auscultation.  Normal respiratory effort and rate.      Heart:    Regular rhythm and normal rate, normal S1 and S2, no murmurs, gallops or rubs.     Chest Wall:    No abnormalities observed   Abdomen:     Soft, nontender, positive bowel sounds.     Extremities:   no cyanosis, clubbing or edema.  No marked joint deformities.  Adequate musculoskeletal strength.       Results Review:      Results from last 7 days  Lab Units 18  0530   SODIUM mmol/L 138   POTASSIUM mmol/L 3.8   CHLORIDE mmol/L 100   CO2 mmol/L 24.3   BUN mg/dL 14   CREATININE mg/dL 0.60*   GLUCOSE mg/dL 211*   CALCIUM mg/dL 9.2       Results from last 7 days  Lab Units 18  0613 18  0001 18  1941   TROPONIN T ng/mL <0.010 <0.010 <0.010       Results from last 7 days  Lab Units 18  0530   WBC 10*3/mm3 10.66   HEMOGLOBIN g/dL 16.9   HEMATOCRIT % 52.4*   PLATELETS 10*3/mm3 154       Results from last 7 days  Lab Units 18  0530 18  0613   INR  0.95 0.93       Results from last 7 days  Lab Units 18  0613   CHOLESTEROL mg/dL 217*           Results from last 7 days  Lab Units 18  0613   CHOLESTEROL mg/dL 217*   TRIGLYCERIDES mg/dL 430*   HDL CHOL mg/dL 31*   LDL CHOL mg/dL 134*       "   Medication Review:     aspirin 81 mg Oral Daily   atorvastatin 80 mg Oral Nightly   carvedilol 12.5 mg Oral Q12H   clopidogrel 75 mg Oral Daily   insulin aspart 0-9 Units Subcutaneous 4x Daily With Meals & Nightly   insulin detemir 20 Units Subcutaneous Nightly   valsartan 160 mg Oral Q24H   vitamin B-12 1,000 mcg Oral Daily             Assessment/Plan     1.  Chest pain. He has risk factors for coronary disease which include uncontrolled diabetes, hypertension, hyperlipidemia and a family history of premature coronary disease.  He is going to have a heart catheterization today. Echo with normal LV systolic function.  Ejection fraction 57%.  No valvular heart disease.  2.  Hypertension.  I increased his Coreg and valsartan yesterday.  His blood pressure looks better.  It may need to be tweaked again before he leaves.  3.   Diabetes.  Poorly controlled.  Hemoglobin A1c 11.3.  4.  Strokelike symptoms.  Seen by Dr. Liu.  No intervention planned.   5.  Obesity  6.  Family history of premature coronary disease in his brother who  of a heart attack at a young age.    - Continue high-dose atorvastatin  - Continue Coreg and valsartan for now  - Heart catheterization today    Ashley Alba MD, [facility]  18  8:28 AM

## 2018-04-16 NOTE — PLAN OF CARE
Problem: Patient Care Overview  Goal: Plan of Care Review   04/16/18 1150   Coping/Psychosocial   Plan of Care Reviewed With patient;spouse   Coping/Psychosocial   Patient Agreement with Plan of Care agrees   Plan of Care Review   Progress improving   OTHER   Outcome Summary Diabetes education initiated

## 2018-04-16 NOTE — PROGRESS NOTES
"   LOS: 3 days   Patient Care Team:  No Known Provider as PCP - General    Chief Complaint: none today    Subjective     Feeling fine today. No chest pain or SOA.        Subjective:  Symptoms:  Resolved.  No shortness of breath, malaise, cough, chest pain, weakness, headache, chest pressure, anorexia, diarrhea or anxiety.    Diet:  NPO.  No nausea or vomiting.    Activity level: Normal.    Pain:  He reports no pain.        History taken from: patient chart family    Objective     Vital Signs  Temp:  [97.3 °F (36.3 °C)-98.6 °F (37 °C)] 97.3 °F (36.3 °C)  Heart Rate:  [69-75] 75  Resp:  [18] 18  BP: (135-149)/(65-80) 146/80    Objective:  General Appearance:  Comfortable and in no acute distress.    Vital signs: (most recent): Blood pressure 146/80, pulse 75, temperature 97.3 °F (36.3 °C), temperature source Oral, resp. rate 18, height 170.2 cm (67\"), weight 101 kg (221 lb 9 oz), SpO2 96 %.  Vital signs are normal.  No fever.    Output: Producing urine and producing stool.    HEENT: Normal HEENT exam.    Lungs:  Normal effort and normal respiratory rate.  Breath sounds clear to auscultation.    Heart: Normal rate.  Regular rhythm.    Abdomen: Abdomen is soft.  Bowel sounds are normal.   There is no abdominal tenderness.     Extremities: There is dependent edema (chronic).    Pulses: Distal pulses are intact.    Neurological: Patient is alert and oriented to person, place and time.    Pupils:  Pupils are equal, round, and reactive to light.    Skin:  Warm and dry.              Results Review:     I reviewed the patient's new clinical results.  I reviewed the patient's new imaging results and agree with the interpretation.  I reviewed the patient's other test results and agree with the interpretation  I personally viewed and interpreted the patient's EKG/Telemetry data  Discussed with patient and wife    Medication Review: reviewed    Assessment/Plan     Principal Problem:    Exertional chest pain  Active Problems:    " Essential hypertension    TIA (transient ischemic attack)    Type 2 diabetes mellitus with other specified complication    HLD (hyperlipidemia)    Carotid stenosis          Plan:   (Left heart cath today  Continue Statin, BB, and ARB  Neuro recommends DAPT, ZioPatch at discharge, and modification of risk factors  Diabetic teaching completed  Continue LA insulin for now  Plan to start oral metformin after dc (when 48h out from contrast study)  Home tomorrow hopefully).       Amaury Johnson MD  04/16/18  2:03 PM    Time: 25min

## 2018-04-16 NOTE — DISCHARGE INSTRUCTIONS
[facility]  [address] [place] [address]    Coronary Angiogram with Stent (Radial Approach) After Care    Refer to this sheet in the next few weeks. These instructions provide you with information on caring for yourself after your procedure. Your health care provider may also give you more specific instructions. Your treatment has been planned according to current medical practices, but problems sometimes occur. Call your health care provider if you have any problems or questions after your procedure.       Home Care Instructions:  · You may shower the day after the procedure. Remove the bandage (dressing) and gently wash the site with plain soap and water. Gently pat the site dry. You may apply a band aid daily for 2 days if desired.    · Do not apply powder or lotion to the site.  · Do not submerge the affected site in water for 3 to 5 days or until the site is completely healed.   · Do not flex or bend the affected arm for 24 hours.  · Do not lift, push or pull anything over 10 pounds for 2 days after your procedure.  · Do not operate machinery or power tools for 24 hours.  · Inspect the site at least twice daily. You may notice some bruising at the site and it may be tender for 1 to 2 weeks.     · Increase your fluid intake for the next 2 days.    · Keep arm elevated for 24 hours.  For the remainder of the day, keep your arm in the “Pledge of Allegiance” position when up and about.    · Limit your activity for the next 48 hours and avoid strenuous activity as directed by your physician.   · Cardiac Rehab may or may not be ordered.  Please consult with your physician  · You may drive 24 hours after the procedure unless otherwise instructed by your caregiver.  · A responsible adult should be with you for the first 24 hours after you arrive home.   · Do not make any important legal decisions or sign legal papers for 24 hours. Do not drink alcohol for 24 hours.    · Take medicines only as  directed by your health care provider.  Blood thinners may be prescribed after your procedure to improve blood flow through the stent.    · Metformin or any medications containing Metformin should not be taken for 48 hours after your procedure.    · Eat a heart-healthy diet. This should include plenty of fresh fruits and vegetables. Meat should be lean cuts. Avoid the following types of food:    ¨ Food that is high in salt.    ¨ Canned or highly processed food.    ¨ Food that is high in saturated fat or sugar.    ¨ Fried food.    · Make any other lifestyle changes recommended by your health care provider. This may include:    ¨ Not using any tobacco products including cigarettes, chewing tobacco, or electronic cigarettes.   ¨ Managing your weight.    ¨ Getting regular exercise.    ¨ Managing your blood pressure.    ¨ Limiting your alcohol intake.    ¨ Managing other health problems, such as diabetes.    · If you need an MRI after your heart stent was placed, be sure to tell the health care provider who orders the MRI that you have a heart stent.    · Keep all follow-up visits as directed by your health care provider.    ·   Call Your Doctor If:  · You have unusual pain at the radial/ulnar (wrist) site.  · You have redness, warmth, swelling, or pain at the radial/ulnar (wrist) site.  · You have drainage (other than a small amount of blood on the dressing).  · `You have chills or a fever > 101.  · Your arm becomes pale or dark, cool, tingly, or numb.  · You develop chest pain, shortness of breath, feel faint or pass out.    · You have heavy bleeding from the site, hold pressure on the site for 20 minutes.  If the bleeding stops, apply a fresh bandage and call your cardiologist.  However, if you continue to have bleeding, call 911.        Make Sure You:   · Understand these instructions.  · Will watch your condition.  · Will get help right away if you are not doing well or get worse.

## 2018-04-17 ENCOUNTER — APPOINTMENT (OUTPATIENT)
Dept: CARDIOLOGY | Facility: HOSPITAL | Age: 63
End: 2018-04-17
Attending: HOSPITALIST

## 2018-04-17 VITALS
DIASTOLIC BLOOD PRESSURE: 69 MMHG | RESPIRATION RATE: 18 BRPM | HEIGHT: 67 IN | OXYGEN SATURATION: 91 % | HEART RATE: 74 BPM | BODY MASS INDEX: 34.78 KG/M2 | SYSTOLIC BLOOD PRESSURE: 133 MMHG | TEMPERATURE: 99.1 F | WEIGHT: 221.56 LBS

## 2018-04-17 PROBLEM — I25.10 CORONARY ARTERY DISEASE: Chronic | Status: ACTIVE | Noted: 2018-04-17

## 2018-04-17 LAB
ACT BLD: 246 SECONDS (ref 82–152)
ALBUMIN SERPL-MCNC: 3.7 G/DL (ref 3.5–5.2)
ALBUMIN/GLOB SERPL: 1.2 G/DL
ALP SERPL-CCNC: 119 U/L (ref 39–117)
ALT SERPL W P-5'-P-CCNC: 19 U/L (ref 1–41)
ANION GAP SERPL CALCULATED.3IONS-SCNC: 14 MMOL/L
AST SERPL-CCNC: 15 U/L (ref 1–40)
BASOPHILS # BLD AUTO: 0.03 10*3/MM3 (ref 0–0.2)
BASOPHILS NFR BLD AUTO: 0.3 % (ref 0–1.5)
BILIRUB SERPL-MCNC: 0.8 MG/DL (ref 0.1–1.2)
BUN BLD-MCNC: 17 MG/DL (ref 8–23)
BUN/CREAT SERPL: 32.1 (ref 7–25)
CALCIUM SPEC-SCNC: 9.2 MG/DL (ref 8.6–10.5)
CHLORIDE SERPL-SCNC: 100 MMOL/L (ref 98–107)
CO2 SERPL-SCNC: 23 MMOL/L (ref 22–29)
CREAT BLD-MCNC: 0.53 MG/DL (ref 0.76–1.27)
DEPRECATED RDW RBC AUTO: 48.7 FL (ref 37–54)
EOSINOPHIL # BLD AUTO: 0.2 10*3/MM3 (ref 0–0.7)
EOSINOPHIL NFR BLD AUTO: 1.8 % (ref 0.3–6.2)
ERYTHROCYTE [DISTWIDTH] IN BLOOD BY AUTOMATED COUNT: 13.8 % (ref 11.5–14.5)
GFR SERPL CREATININE-BSD FRML MDRD: >150 ML/MIN/1.73
GLOBULIN UR ELPH-MCNC: 3 GM/DL
GLUCOSE BLD-MCNC: 223 MG/DL (ref 65–99)
GLUCOSE BLDC GLUCOMTR-MCNC: 203 MG/DL (ref 70–130)
GLUCOSE BLDC GLUCOMTR-MCNC: 248 MG/DL (ref 70–130)
HCT VFR BLD AUTO: 53.7 % (ref 40.4–52.2)
HGB BLD-MCNC: 17 G/DL (ref 13.7–17.6)
IMM GRANULOCYTES # BLD: 0.07 10*3/MM3 (ref 0–0.03)
IMM GRANULOCYTES NFR BLD: 0.6 % (ref 0–0.5)
LYMPHOCYTES # BLD AUTO: 2.26 10*3/MM3 (ref 0.9–4.8)
LYMPHOCYTES NFR BLD AUTO: 20.1 % (ref 19.6–45.3)
MCH RBC QN AUTO: 30.6 PG (ref 27–32.7)
MCHC RBC AUTO-ENTMCNC: 31.7 G/DL (ref 32.6–36.4)
MCV RBC AUTO: 96.8 FL (ref 79.8–96.2)
MONOCYTES # BLD AUTO: 1.08 10*3/MM3 (ref 0.2–1.2)
MONOCYTES NFR BLD AUTO: 9.6 % (ref 5–12)
NEUTROPHILS # BLD AUTO: 7.62 10*3/MM3 (ref 1.9–8.1)
NEUTROPHILS NFR BLD AUTO: 67.6 % (ref 42.7–76)
PLATELET # BLD AUTO: 154 10*3/MM3 (ref 140–500)
PMV BLD AUTO: 11.1 FL (ref 6–12)
POTASSIUM BLD-SCNC: 3.8 MMOL/L (ref 3.5–5.2)
PROT SERPL-MCNC: 6.7 G/DL (ref 6–8.5)
RBC # BLD AUTO: 5.55 10*6/MM3 (ref 4.6–6)
SODIUM BLD-SCNC: 137 MMOL/L (ref 136–145)
WBC NRBC COR # BLD: 11.26 10*3/MM3 (ref 4.5–10.7)

## 2018-04-17 PROCEDURE — 93010 ELECTROCARDIOGRAM REPORT: CPT | Performed by: INTERNAL MEDICINE

## 2018-04-17 PROCEDURE — 99232 SBSQ HOSP IP/OBS MODERATE 35: CPT | Performed by: INTERNAL MEDICINE

## 2018-04-17 PROCEDURE — 93005 ELECTROCARDIOGRAM TRACING: CPT | Performed by: INTERNAL MEDICINE

## 2018-04-17 PROCEDURE — 63710000001 INSULIN ASPART PER 5 UNITS: Performed by: INTERNAL MEDICINE

## 2018-04-17 PROCEDURE — 82962 GLUCOSE BLOOD TEST: CPT

## 2018-04-17 PROCEDURE — 80053 COMPREHEN METABOLIC PANEL: CPT | Performed by: HOSPITALIST

## 2018-04-17 PROCEDURE — 85025 COMPLETE CBC W/AUTO DIFF WBC: CPT | Performed by: HOSPITALIST

## 2018-04-17 PROCEDURE — 0296T HC EXT ECG > 48HR TO 21 DAY RCRD W/CONECT INTL RCRD: CPT

## 2018-04-17 RX ORDER — CLOPIDOGREL BISULFATE 75 MG/1
75 TABLET ORAL DAILY
Qty: 30 TABLET | Refills: 0 | Status: SHIPPED | OUTPATIENT
Start: 2018-04-18 | End: 2021-09-17 | Stop reason: SDUPTHER

## 2018-04-17 RX ORDER — ATORVASTATIN CALCIUM 80 MG/1
80 TABLET, FILM COATED ORAL NIGHTLY
Qty: 30 TABLET | Refills: 0 | Status: SHIPPED | OUTPATIENT
Start: 2018-04-17 | End: 2021-11-04 | Stop reason: SDUPTHER

## 2018-04-17 RX ORDER — VALSARTAN 320 MG/1
320 TABLET ORAL
Status: DISCONTINUED | OUTPATIENT
Start: 2018-04-18 | End: 2018-04-17 | Stop reason: HOSPADM

## 2018-04-17 RX ORDER — CARVEDILOL 25 MG/1
25 TABLET ORAL EVERY 12 HOURS SCHEDULED
Qty: 60 TABLET | Refills: 0 | Status: SHIPPED | OUTPATIENT
Start: 2018-04-17 | End: 2021-09-17 | Stop reason: SDUPTHER

## 2018-04-17 RX ORDER — CARVEDILOL 25 MG/1
25 TABLET ORAL EVERY 12 HOURS SCHEDULED
Status: DISCONTINUED | OUTPATIENT
Start: 2018-04-17 | End: 2018-04-17 | Stop reason: HOSPADM

## 2018-04-17 RX ORDER — VALSARTAN 160 MG/1
160 TABLET ORAL ONCE
Status: COMPLETED | OUTPATIENT
Start: 2018-04-17 | End: 2018-04-17

## 2018-04-17 RX ORDER — CARVEDILOL 12.5 MG/1
12.5 TABLET ORAL ONCE
Status: COMPLETED | OUTPATIENT
Start: 2018-04-17 | End: 2018-04-17

## 2018-04-17 RX ORDER — VALSARTAN 320 MG/1
320 TABLET ORAL
Qty: 30 TABLET | Refills: 0 | Status: SHIPPED | OUTPATIENT
Start: 2018-04-18 | End: 2021-09-15 | Stop reason: HOSPADM

## 2018-04-17 RX ORDER — ASPIRIN 81 MG/1
81 TABLET ORAL DAILY
Qty: 30 TABLET | Refills: 0 | Status: SHIPPED | OUTPATIENT
Start: 2018-04-18

## 2018-04-17 RX ADMIN — INSULIN ASPART 4 UNITS: 100 INJECTION, SOLUTION INTRAVENOUS; SUBCUTANEOUS at 06:48

## 2018-04-17 RX ADMIN — ACETAMINOPHEN 650 MG: 325 TABLET ORAL at 01:28

## 2018-04-17 RX ADMIN — CARVEDILOL 12.5 MG: 12.5 TABLET, FILM COATED ORAL at 10:50

## 2018-04-17 RX ADMIN — ASPIRIN 81 MG: 81 TABLET ORAL at 08:28

## 2018-04-17 RX ADMIN — VALSARTAN 160 MG: 160 TABLET ORAL at 08:28

## 2018-04-17 RX ADMIN — Medication 10 ML: at 08:28

## 2018-04-17 RX ADMIN — Medication 1000 MCG: at 08:28

## 2018-04-17 RX ADMIN — CLOPIDOGREL 75 MG: 75 TABLET, FILM COATED ORAL at 08:28

## 2018-04-17 RX ADMIN — CARVEDILOL 12.5 MG: 12.5 TABLET, FILM COATED ORAL at 08:28

## 2018-04-17 RX ADMIN — VALSARTAN 160 MG: 160 TABLET ORAL at 10:50

## 2018-04-17 RX ADMIN — ACETAMINOPHEN 650 MG: 325 TABLET ORAL at 06:52

## 2018-04-17 NOTE — PROGRESS NOTES
"Patient Name: Matt Cruz  :1955  62 y.o.      Patient Care Team:  No Known Provider as PCP - General    Interval History:   Status post post heart catheterization    Subjective:  Following for CP     Objective   Vital Signs  Temp:  [97.5 °F (36.4 °C)-98 °F (36.7 °C)] 98 °F (36.7 °C)  Heart Rate:  [73-83] 75  Resp:  [16-20] 18  BP: (144-173)/(59-92) 173/80    Intake/Output Summary (Last 24 hours) at 18 0925  Last data filed at 18 0814   Gross per 24 hour   Intake              850 ml   Output                0 ml   Net              850 ml     Flowsheet Rows    Flowsheet Row First Filed Value   Admission Height 170.2 cm (67\") Documented at 2018 1020   Admission Weight 97.5 kg (215 lb) Documented at 2018 1020          Physical Exam:   General Appearance:    Alert, cooperative, in no acute distress   Lungs:     Clear to auscultation.  Normal respiratory effort and rate.      Heart:    Regular rhythm and normal rate, normal S1 and S2, no murmurs, gallops or rubs.     Chest Wall:    No abnormalities observed   Abdomen:     Soft, nontender, positive bowel sounds.     Extremities:   no cyanosis, clubbing or edema.  No marked joint deformities.  Adequate musculoskeletal strength.       Results Review:      Results from last 7 days  Lab Units 18  0439   SODIUM mmol/L 137   POTASSIUM mmol/L 3.8   CHLORIDE mmol/L 100   CO2 mmol/L 23.0   BUN mg/dL 17   CREATININE mg/dL 0.53*   GLUCOSE mg/dL 223*   CALCIUM mg/dL 9.2       Results from last 7 days  Lab Units 18  0613 18  0001 18  1941   TROPONIN T ng/mL <0.010 <0.010 <0.010       Results from last 7 days  Lab Units 18  0439   WBC 10*3/mm3 11.26*   HEMOGLOBIN g/dL 17.0   HEMATOCRIT % 53.7*   PLATELETS 10*3/mm3 154       Results from last 7 days  Lab Units 18  0530 18  0613   INR  0.95 0.93       Results from last 7 days  Lab Units 18  0613   CHOLESTEROL mg/dL 217*           Results from last " days  Lab Units 18  0613   CHOLESTEROL mg/dL 217*   TRIGLYCERIDES mg/dL 430*   HDL CHOL mg/dL 31*   LDL CHOL mg/dL 134*         Medication Review:     aspirin 81 mg Oral Daily   atorvastatin 80 mg Oral Nightly   carvedilol 12.5 mg Oral Q12H   clopidogrel 75 mg Oral Daily   insulin aspart 0-9 Units Subcutaneous 4x Daily With Meals & Nightly   insulin detemir 20 Units Subcutaneous Nightly   valsartan 160 mg Oral Q24H   vitamin B-12 1,000 mcg Oral Daily             Assessment/Plan        1.  Coronary artery disease.  He has diffuse diabetic disease with a chronically totally occluded right coronary artery.  A stent was placed to his circumflex.  His LAD is diseased but nothing that required intervention.  He needs dual antiplatelet therapy for life and maximal risk factor modification.  Luckily, he has normal LV systolic function by his echocardiogram.  Continue aspirin and Plavix.  2.  Hypertension.  In going to increase his Coreg and valsartan..  3.   Diabetes.  Poorly controlled.  Hemoglobin A1c 11.3.  4.  Strokelike symptoms.  Seen by Dr. Liu.  No intervention planned.   5.  Obesity  6.  Family history of premature coronary disease in his brother who  of a heart attack at a young age.     - Maximal risk factor modification with high dose statins and good diabetic control.  He needs to exercise regularly.  - Dual antiplatelet therapy for life  - I have increased his blood pressure medication.  - Follow-up with Teri in one week and me in 6 weeks.    Ashley Alba MD, [facility]  18  9:25 AM

## 2018-04-17 NOTE — CONSULTS
"Diabetes Education  Assessment/Teaching    Patient Name:  Matt Cruz  YOB: 1955  MRN: [id number]  Admit Date:  4/13/2018      Assessment Date:  4/17/2018  Flowsheet Row Most Recent Value   General Information    Referral From: MD mahoney   Height 170.2 cm (67.01\")   Height Method Stated   Weight 101 kg (221 lb 9 oz)   Weight Method Bed scale   Pregnancy Assessment   Diabetes History   What type of diabetes do you have? Type 2   Length of Diabetes Diagnosis 6 - 10 years [Pt states was initially dx'd in 2010 and was prescribed metformin.  ]   Have you had diabetes education/teaching in the past? yes   When and where was your diabetes education? Pt states possibly attended one class of DSME at [facility] in 2010   Do you test your blood sugar at home? no [Pt states had tested BG in the past]   Education Preferences   Barriers to Learning other (comment) [None]   Nutrition Information   Assessment Topics   Healthy Eating - Assessment Needs education   Taking Medication - Assessment Needs education   Problem Solving - Assessment Needs education   Reducing Risk - Assessment Needs education   Monitoring - Assessment Needs education   DM Goals   Healthy Eating - Goal 0-30 days from discharge   Taking Medication - Goal 0-7 days from discharge   Problem Solving - Goal 0-30 days from discharge   Reducing Risk - Goal 30-90 days from discharge   Monitoring - Goal 0-7 days from discharge   Contact Plan Follow-up medical care          Flowsheet Row Most Recent Value   DM Education Needs   Medication Other (comment) [Staff states pt gave self Levemir last evening with no issues.  Pt states no questions at this time.  Discussed MD notes of d/c home w/ Metformin.  Pt states was previously on Metfomin in the past.     ]   Healthy Coping Appropriate   Patient Response Verbalized understanding            Other Comments:          Electronically signed by:  Leticia Hernandez RN  04/17/18 10:18 AM  "

## 2018-04-17 NOTE — CONSULTS
Met with pt just after Dr. Alba spoke to him about his risk factors and managing them.  Provided cardiac rehab packet and information about the program.  Pt actually lives and works closer to program at [facility] location.  Provided that program's phone number as well as ours.  Encouraged to call and make first appt and to take AVS to first appt.

## 2018-04-17 NOTE — DISCHARGE SUMMARY
Name: Matt Cruz  Age: 62 y.o.  Sex: male  :  1955  MRN: [id number]         Primary Care Physician: No Known Provider      Date of Admission:  2018  Date of Discharge:  2018      CHIEF COMPLAINT  Hypertension and Neuro Deficit(s) (last week - denies symptoms today)      DISCHARGE DIAGNOSIS  Active Hospital Problems (** Indicates Principal Problem)    Diagnosis Date Noted   • **Exertional chest pain [R07.9] 2018   • Coronary artery disease [I25.10] 2018   • Carotid stenosis [I65.29] 04/15/2018   • Essential hypertension [I10] 2018   • TIA (transient ischemic attack) [G45.9] 2018   • Type 2 diabetes mellitus with other specified complication [E11.69] 2018   • HLD (hyperlipidemia) [E78.5] 2018      Resolved Hospital Problems    Diagnosis Date Noted Date Resolved   No resolved problems to display.       SECONDARY DIAGNOSES  Past Medical History:   Diagnosis Date   • Coronary artery disease    • Hyperlipidemia    • Hypertension    • Kidney stone        CONSULTS   Consult Orders (all)     Start     Ordered    18 0926  Inpatient Cardiothoracic Surgery Consult  Once,   Status:  Canceled     Specialty:  Cardiothoracic Surgery  Provider:  (Not yet assigned)    18 0925    18 1605  Cardiac Rehab Evaluation and Enrollment  Once     Provider:  (Not yet assigned)    18 1604    18 1154  Inpatient Nutrition Consult  Once     Comments:  Newly dx'd   Provider:  (Not yet assigned)    18 1154    04/15/18 1117  Inpatient Neurosurgery Consult  Once     Specialty:  Neurosurgery  Provider:  Gunnar Liu MD    04/15/18 1118    04/15/18 1051  Inpatient Vascular Surgery Consult  Once,   Status:  Canceled     Specialty:  Vascular Surgery  Provider:  Zeus Busby MD    04/15/18 1050    18 0000  Inpatient Neuro Clinical Specialist Consult  Once     Provider:  Jack Gandhi MD    18 1448    18 1449  Inpatient Rehab  Admission Consult  Once     Provider:  (Not yet assigned)    04/13/18 1448    04/13/18 1449  Inpatient Case Management  Consult  Once     Provider:  (Not yet assigned)    04/13/18 1448    04/13/18 1449  Inpatient Diabetes Educator Consult  Once     Provider:  (Not yet assigned)    04/13/18 1448    04/13/18 1449  Inpatient Cardiology Consult  Once     Specialty:  Cardiology  Provider:  Ashley Alba MD    04/13/18 1448    04/13/18 1449  Inpatient Neurology Consult  Once     Specialty:  Neurology  Provider:  Jack Gandhi MD    04/13/18 1448    04/13/18 1257  Cardiology (on-call MD unless specified)  Once     Specialty:  Cardiology  Provider:  (Not yet assigned)    04/13/18 1257    04/13/18 1232  [facility] (on-call MD unless specified)  Once     Specialty:  Internal Medicine  Provider:  (Not yet assigned)    04/13/18 1231          PROCEDURES PERFORMED  Left heart cath with stent to circumflex, 4/16, Dr. Alba        HOSPITAL COURSE  Admit with exertional CP, recent TIA symptoms, uncontrolled HTN, and DM2 (A1c 11).   No acute CVA. Neuro recommends statin, ASA, and ZioPatch. Follow up with them in 3 months per usual.  Pt underwent cardiac cath and stent to circumflex. Dr. Alba recommends better BP and blood sugar control, dual antiplatelet therapy for life, lifestyle modifications, cardiac rehab, and follow up in their office at 1 week and 6 weeks post-discharge.   Pt has been started on once nightly long-acting insulin (Levemir). Tomorrow evening he will start BID AC Metformin.  Diabetic educators have met with pt during admission.  Pt's TSH was slightly elevated at ~7. Will need repeated along with free T4 at f/u with PCP.      PHYSICAL EXAM  Temp:  [97.5 °F (36.4 °C)-98 °F (36.7 °C)] 98 °F (36.7 °C)  Heart Rate:  [73-83] 74  Resp:  [16-20] 18  BP: (142-173)/(59-92) 142/68  Body mass index is 34.69 kg/m².  Physical Exam  General Appearance:  Comfortable and in no acute distress.    Output: Producing  urine and producing stool.    HEENT: Normal HEENT exam.    Lungs:  Normal effort and normal respiratory rate.  Breath sounds clear to auscultation.    Heart: Normal rate.  Regular rhythm.    Abdomen: Abdomen is soft.  Bowel sounds are normal.   There is no abdominal tenderness.     Extremities: There is dependent edema (chronic).    Pulses: Distal pulses are intact.    Neurological: Patient is alert and oriented to person, place and time.    Pupils:  Pupils are equal, round, and reactive to light.    Skin:  Warm and dry    CONDITION ON DISCHARGE  Stable.      DISCHARGE DISPOSITION   Home with family      ALLERGIES  No Known Allergies      DISCHARGE MEDICATIONS     Your medication list      START taking these medications      Instructions Last Dose Given Next Dose Due   aspirin 81 MG EC tablet  Start taking on:  4/18/2018  Replaces:  aspirin 325 MG tablet      Take 1 tablet by mouth Daily.       atorvastatin 80 MG tablet  Commonly known as:  LIPITOR      Take 1 tablet by mouth Every Night.       carvedilol 25 MG tablet  Commonly known as:  COREG      Take 1 tablet by mouth Every 12 (Twelve) Hours.       clopidogrel 75 MG tablet  Commonly known as:  PLAVIX  Start taking on:  4/18/2018      Take 1 tablet by mouth Daily.       cyanocobalamin 1000 MCG tablet  Commonly known as:  VITAMIN B-12  Start taking on:  4/18/2018      Take 1 tablet by mouth Daily.       insulin detemir 100 UNIT/ML injection  Commonly known as:  LEVEMIR      Inject 20 Units under the skin Every Night.       metFORMIN 500 MG tablet  Commonly known as:  GLUCOPHAGE      Take 1 tablet by mouth 2 (Two) Times a Day With Meals.       valsartan 320 MG tablet  Commonly known as:  DIOVAN  Start taking on:  4/18/2018      Take 1 tablet by mouth Daily.          CONTINUE taking these medications      Instructions Last Dose Given Next Dose Due   diphenhydrAMINE 25 mg capsule  Commonly known as:  BENADRYL      Take 25 mg by mouth At Night As Needed for  Allergies.          STOP taking these medications    aspirin 325 MG tablet  Replaced by:  aspirin 81 MG EC tablet              Where to Get Your Medications      These medications were sent to [address], [place] - [address] [place] [address] AT [facility] [address] & (LOS A - [phone] PH - [phone] FX  [address] [facility], [facility] [address]    Phone:  [phone]    aspirin 81 MG EC tablet   atorvastatin 80 MG tablet   carvedilol 25 MG tablet   clopidogrel 75 MG tablet   cyanocobalamin 1000 MCG tablet   insulin detemir 100 UNIT/ML injection   metFORMIN 500 MG tablet   valsartan 320 MG tablet        No future appointments.  Additional Instructions for the Follow-ups that You Need to Schedule     Discharge Follow-up with PCP    As directed      Follow Up Details:  Indira RESENDEZ (PCP) in 1 week         Discharge Follow-up with Specialty: [facility]; 3 Months    As directed      Specialty:  [facility]    Follow Up:  3 Months         Discharge Follow-up with Specified Provider: Dr. Alba ([facility]); 6 Weeks    As directed      To:  Dr. Alba ([facility])    Follow Up:  6 Weeks         Discharge Follow-up with Specified Provider: Teri Tejeda ([facility]); 1 Week    As directed      To:  Teri Tejeda ([facility])    Follow Up:  1 Week           Follow-up Information     No Known Provider .    Why:  Jacobson NP (PCP) in 1 week  Contact information:  [facility] [address]                   TEST  RESULTS PENDING AT DISCHARGE  None       CODE STATUS  Full Code        Amaury Johnson MD  [facility]  04/17/18  11:11 AM      Time: greater than 30 minutes.

## 2018-04-17 NOTE — PLAN OF CARE
Problem: Patient Care Overview  Goal: Plan of Care Review  Outcome: Ongoing (interventions implemented as appropriate)   04/17/18 1010   Coping/Psychosocial   Plan of Care Reviewed With patient   Coping/Psychosocial   Patient Agreement with Plan of Care agrees   Plan of Care Review   Progress improving   OTHER   Outcome Summary Diabetes education completed

## 2018-04-17 NOTE — PROGRESS NOTES
Case Management Discharge Note    Final Note: Pt discharging home.  Pt denied d/c needs.      Destination     No service coordination in this encounter.      Durable Medical Equipment     No service coordination in this encounter.      Dialysis/Infusion     No service coordination in this encounter.      Home Medical Care     No service coordination in this encounter.      Social Care     No service coordination in this encounter.        Other:  (private auto)    Final Discharge Disposition Code: 01 - home or self-care

## 2018-04-17 NOTE — PLAN OF CARE
Problem: Patient Care Overview  Goal: Plan of Care Review  Outcome: Ongoing (interventions implemented as appropriate)   04/17/18 0353   Coping/Psychosocial   Plan of Care Reviewed With patient   Plan of Care Review   Progress improving   OTHER   Outcome Summary VSS. Patient denies pain at radial site. SR on monitor. Possible D/C 04/17. Will continue to monitor.      Goal: Individualization and Mutuality  Outcome: Ongoing (interventions implemented as appropriate)    Goal: Discharge Needs Assessment  Outcome: Ongoing (interventions implemented as appropriate)    Goal: Interprofessional Rounds/Family Conf  Outcome: Ongoing (interventions implemented as appropriate)      Problem: Stroke (Ischemic) (Adult)  Goal: Signs and Symptoms of Listed Potential Problems Will be Absent, Minimized or Managed (Stroke)   04/17/18 7113   Goal/Outcome Evaluation   Problems Assessed (Stroke (Ischemic)) all   Problems Assessed (Stroke (Ischemic)) none

## 2018-04-18 NOTE — PAYOR COMM NOTE
"Matt Faye (62 y.o. Male)                     ATTENTION;  CASE REF [id number], DISCHARGE SUMMARY FOR YOUR REVIEW,        Date of Birth Social Security Number Address Home Phone MRN    1955  [address] [id number] [phone] [id number]    Yarsanism Marital Status          Faith        Admission Date Admission Type Admitting Provider Attending Provider Department, Room/Bed    18 Emergency Jaden Aguilar MD  [address],     Discharge Date Discharge Disposition Discharge Destination        2018 Home or Self Care              Attending Provider:  (none)   Allergies:  No Known Allergies    Isolation:  None   Infection:  None   Code Status:  Prior    Ht:  170.2 cm (67.01\")   Wt:  101 kg (221 lb 9 oz)    Admission Cmt:  None   Principal Problem:  Exertional chest pain [R07.9]                 Active Insurance as of 2018     Primary Coverage     Payor Plan Insurance Group Employer/Plan Group    [email or web address] [id number]     Payor Plan Address Payor Plan Phone Number Effective From Effective To    [address] [phone] 2018     [place] [id number]       Subscriber Name Subscriber Birth Date Member ID       MATT FAYE 1955 [id number]                 Emergency Contacts      (Rel.) Home Phone Work Phone Mobile Phone    Riley Faye (Spouse) -- [phone]    Matt Faye Jr (Son) -- -- [phone]               Discharge Summary      Amaury Johnson MD at 2018 11:11 AM                 Name: Matt Faye  Age: 62 y.o.  Sex: male  :  1955  MRN: [id number]         Primary Care Physician: No Known Provider      Date of Admission:  2018  Date of Discharge:  2018      CHIEF COMPLAINT  Hypertension and Neuro Deficit(s) (last week - denies symptoms today)      DISCHARGE DIAGNOSIS  Active Hospital Problems (** Indicates Principal Problem)    Diagnosis Date " Noted   • **Exertional chest pain [R07.9] 04/13/2018   • Coronary artery disease [I25.10] 04/17/2018   • Carotid stenosis [I65.29] 04/15/2018   • Essential hypertension [I10] 04/13/2018   • TIA (transient ischemic attack) [G45.9] 04/13/2018   • Type 2 diabetes mellitus with other specified complication [E11.69] 04/13/2018   • HLD (hyperlipidemia) [E78.5] 04/13/2018      Resolved Hospital Problems    Diagnosis Date Noted Date Resolved   No resolved problems to display.       SECONDARY DIAGNOSES  Past Medical History:   Diagnosis Date   • Coronary artery disease    • Hyperlipidemia    • Hypertension    • Kidney stone        CONSULTS   Consult Orders (all)     Start     Ordered    04/17/18 0926  Inpatient Cardiothoracic Surgery Consult  Once,   Status:  Canceled     Specialty:  Cardiothoracic Surgery  Provider:  (Not yet assigned)    04/17/18 0925    04/16/18 1605  Cardiac Rehab Evaluation and Enrollment  Once     Provider:  (Not yet assigned)    04/16/18 1604    04/16/18 1154  Inpatient Nutrition Consult  Once     Comments:  Newly dx'd   Provider:  (Not yet assigned)    04/16/18 1154    04/15/18 1117  Inpatient Neurosurgery Consult  Once     Specialty:  Neurosurgery  Provider:  Gunnar Liu MD    04/15/18 1118    04/15/18 1051  Inpatient Vascular Surgery Consult  Once,   Status:  Canceled     Specialty:  Vascular Surgery  Provider:  Zeus Busby MD    04/15/18 1050    04/14/18 0000  Inpatient Neuro Clinical Specialist Consult  Once     Provider:  Jack Gandhi MD    04/13/18 1448    04/13/18 1449  Inpatient Rehab Admission Consult  Once     Provider:  (Not yet assigned)    04/13/18 1448    04/13/18 1449  Inpatient Case Management  Consult  Once     Provider:  (Not yet assigned)    04/13/18 1448    04/13/18 1449  Inpatient Diabetes Educator Consult  Once     Provider:  (Not yet assigned)    04/13/18 1448    04/13/18 1449  Inpatient Cardiology Consult  Once     Specialty:  Cardiology   Provider:  Ashley Alba MD    04/13/18 1448    04/13/18 1449  Inpatient Neurology Consult  Once     Specialty:  Neurology  Provider:  Jack Gandhi MD    04/13/18 1448    04/13/18 1257  Cardiology (on-call MD unless specified)  Once     Specialty:  Cardiology  Provider:  (Not yet assigned)    04/13/18 1257    04/13/18 1232  [facility] (on-call MD unless specified)  Once     Specialty:  Internal Medicine  Provider:  (Not yet assigned)    04/13/18 1231          PROCEDURES PERFORMED  Left heart cath with stent to circumflex, 4/16, Dr. Alba        HOSPITAL COURSE  Admit with exertional CP, recent TIA symptoms, uncontrolled HTN, and DM2 (A1c 11).   No acute CVA. Neuro recommends statin, ASA, and ZioPatch. Follow up with them in 3 months per usual.  Pt underwent cardiac cath and stent to circumflex. Dr. Alba recommends better BP and blood sugar control, dual antiplatelet therapy for life, lifestyle modifications, cardiac rehab, and follow up in their office at 1 week and 6 weeks post-discharge.   Pt has been started on once nightly long-acting insulin (Levemir). Tomorrow evening he will start BID AC Metformin.  Diabetic educators have met with pt during admission.  Pt's TSH was slightly elevated at ~7. Will need repeated along with free T4 at f/u with PCP.      PHYSICAL EXAM  Temp:  [97.5 °F (36.4 °C)-98 °F (36.7 °C)] 98 °F (36.7 °C)  Heart Rate:  [73-83] 74  Resp:  [16-20] 18  BP: (142-173)/(59-92) 142/68  Body mass index is 34.69 kg/m².  Physical Exam  General Appearance:  Comfortable and in no acute distress.    Output: Producing urine and producing stool.    HEENT: Normal HEENT exam.    Lungs:  Normal effort and normal respiratory rate.  Breath sounds clear to auscultation.    Heart: Normal rate.  Regular rhythm.    Abdomen: Abdomen is soft.  Bowel sounds are normal.   There is no abdominal tenderness.     Extremities: There is dependent edema (chronic).    Pulses: Distal pulses are intact.    Neurological: Patient  is alert and oriented to person, place and time.    Pupils:  Pupils are equal, round, and reactive to light.    Skin:  Warm and dry    CONDITION ON DISCHARGE  Stable.      DISCHARGE DISPOSITION   Home with family      ALLERGIES  No Known Allergies      DISCHARGE MEDICATIONS     Your medication list      START taking these medications      Instructions Last Dose Given Next Dose Due   aspirin 81 MG EC tablet  Start taking on:  4/18/2018  Replaces:  aspirin 325 MG tablet      Take 1 tablet by mouth Daily.       atorvastatin 80 MG tablet  Commonly known as:  LIPITOR      Take 1 tablet by mouth Every Night.       carvedilol 25 MG tablet  Commonly known as:  COREG      Take 1 tablet by mouth Every 12 (Twelve) Hours.       clopidogrel 75 MG tablet  Commonly known as:  PLAVIX  Start taking on:  4/18/2018      Take 1 tablet by mouth Daily.       cyanocobalamin 1000 MCG tablet  Commonly known as:  VITAMIN B-12  Start taking on:  4/18/2018      Take 1 tablet by mouth Daily.       insulin detemir 100 UNIT/ML injection  Commonly known as:  LEVEMIR      Inject 20 Units under the skin Every Night.       metFORMIN 500 MG tablet  Commonly known as:  GLUCOPHAGE      Take 1 tablet by mouth 2 (Two) Times a Day With Meals.       valsartan 320 MG tablet  Commonly known as:  DIOVAN  Start taking on:  4/18/2018      Take 1 tablet by mouth Daily.          CONTINUE taking these medications      Instructions Last Dose Given Next Dose Due   diphenhydrAMINE 25 mg capsule  Commonly known as:  BENADRYL      Take 25 mg by mouth At Night As Needed for Allergies.          STOP taking these medications    aspirin 325 MG tablet  Replaced by:  aspirin 81 MG EC tablet              Where to Get Your Medications      [facility] [address] - [address] [place] [address] AT [facility] & (LOS A - [phone] [facility] [phone]   [address] [place], [place] [address]    Phone:  [phone]    aspirin 81  MG EC tablet   atorvastatin 80 MG tablet   carvedilol 25 MG tablet   clopidogrel 75 MG tablet   cyanocobalamin 1000 MCG tablet   insulin detemir 100 UNIT/ML injection   metFORMIN 500 MG tablet   valsartan 320 MG tablet        No future appointments.  Additional Instructions for the Follow-ups that You Need to Schedule     Discharge Follow-up with PCP    As directed      Follow Up Details:  Indira RESENDEZ (PCP) in 1 week         Discharge Follow-up with Specialty: [facility]; 3 Months    As directed      Specialty:  [facility]    Follow Up:  3 Months         Discharge Follow-up with Specified Provider: Dr. Alba ([facility]); 6 Weeks    As directed      To:  Dr. Alba ([facility])    Follow Up:  6 Weeks         Discharge Follow-up with Specified Provider: Teri Tejeda ([facility]); 1 Week    As directed      To:  Teri Tejeda ([facility])    Follow Up:  1 Week           Follow-up Information     No Known Provider .    Why:  Indira NP (PCP) in 1 week  Contact information:  [facility] [address]                   TEST  RESULTS PENDING AT DISCHARGE  None       CODE STATUS  Full Code        Amaury Johnson MD  [facility]  04/17/18  11:11 AM      Time: greater than 30 minutes.      Electronically signed by Amaury Johnson MD at 4/17/2018  4:09 PM

## 2018-04-24 ENCOUNTER — OFFICE VISIT (OUTPATIENT)
Dept: CARDIOLOGY | Facility: CLINIC | Age: 63
End: 2018-04-24

## 2018-04-24 VITALS
SYSTOLIC BLOOD PRESSURE: 140 MMHG | BODY MASS INDEX: 34.06 KG/M2 | HEIGHT: 67 IN | WEIGHT: 217 LBS | DIASTOLIC BLOOD PRESSURE: 64 MMHG | HEART RATE: 74 BPM

## 2018-04-24 DIAGNOSIS — I65.29 STENOSIS OF CAROTID ARTERY, UNSPECIFIED LATERALITY: ICD-10-CM

## 2018-04-24 DIAGNOSIS — G45.9 TRANSIENT CEREBRAL ISCHEMIA, UNSPECIFIED TYPE: ICD-10-CM

## 2018-04-24 DIAGNOSIS — E11.69 TYPE 2 DIABETES MELLITUS WITH OTHER SPECIFIED COMPLICATION, UNSPECIFIED LONG TERM INSULIN USE STATUS: ICD-10-CM

## 2018-04-24 DIAGNOSIS — E78.5 HYPERLIPIDEMIA, UNSPECIFIED HYPERLIPIDEMIA TYPE: ICD-10-CM

## 2018-04-24 DIAGNOSIS — I10 ESSENTIAL HYPERTENSION: ICD-10-CM

## 2018-04-24 DIAGNOSIS — I25.10 CORONARY ARTERY DISEASE INVOLVING NATIVE HEART WITHOUT ANGINA PECTORIS, UNSPECIFIED VESSEL OR LESION TYPE: Primary | Chronic | ICD-10-CM

## 2018-04-24 PROCEDURE — 99214 OFFICE O/P EST MOD 30 MIN: CPT | Performed by: NURSE PRACTITIONER

## 2018-04-24 PROCEDURE — 93000 ELECTROCARDIOGRAM COMPLETE: CPT | Performed by: NURSE PRACTITIONER

## 2018-04-24 NOTE — PROGRESS NOTES
Date of Office Visit: 2018  Encounter Provider: ISABELLA Cervantes  Place of Service: [facility] CARDIOLOGY  Patient Name: Matt Cruz  :1955    Chief Complaint   Patient presents with   • Fatigue   :     HPI: Matt Cruz is a 62 y.o. male comes in today for Hospital followup. He is a patient of Dr. Alba. I am seeing him for the 1st time today and have reviewed his records.    He has a history of coronary disease, status post a stent to the mid LAD, hypertension, TIA, diabetes, hyperlipidemia, and carotid stenosis.     He was sent to the emergency room from his primary care office for possible stroke. He had some right facial numbness and right arm numbness. This episode he had resolved after 5 minutes. His blood pressure was high in his primary care office the day he went to the ER.    In , he did have cardiac cath showing 3-vessel disease with a 90% stenosis in the mid LAD, RCA occlusion and 30% stenosis of the left circumflex with an EF of 50% to 55%, angioplasty and drug-eluting stent of the mid LAD. However, the angioplasty was unsuccessful of the RCA. Medical management was recommended.  An echocardiogram was performed on 2018 showing mild concentric hypertrophy and an EF of 57%. He also had some anginal chest pain. He did undergo a cardiac catheterization showing severe 3-vessel coronary disease with an occluded RCA, high-grade mid left circumflex, and diffuse LAD and patent LAD. He had a drug-eluting stent placed to the mid left circumflex, placed on dual antiplatelet therapy. He was sent home with a Zio patch in place to evaluate for an arrhythmia. CT angiogram of the head and neck was normal without acute intracranial hemorrhage or stroke. He did have a severe focal stenosis of the left P2 segment.    He comes in for follow-up today.  He denies any palpitations or tachycardia.  He denies shortness of breath, edema, dizziness, chest pain or  pressure, or orthopnea or paroxysmal nocturnal dyspnea.  His greatest complaint is fatigue.  He has had a few medication changes since his admission.  His blood pressure is better controlled than it was on admission.  He has also had some diarrhea likely related to metformin.       Past Medical History:   Diagnosis Date   • CAD (coronary artery disease)    • Carotid stenosis    • Coronary artery disease    • Essential hypertension    • Exertional chest pain    • Hyperlipidemia    • Hypertension    • Kidney stone    • TIA (transient ischemic attack)    • Type 2 diabetes mellitus with other specified complication        Past Surgical History:   Procedure Laterality Date   • CARDIAC CATHETERIZATION Left 4/16/2018    Procedure: Cardiac Catheterization/Vascular Study;  Surgeon: Zane De La O MD;  Location: [facility] CATH INVASIVE LOCATION;  Service: Cardiovascular   • CARDIAC CATHETERIZATION N/A 4/16/2018    Procedure: Stent KD coronary;  Surgeon: Zane De La O MD;  Location: [facility] CATH INVASIVE LOCATION;  Service: Cardiovascular   • CORONARY ANGIOPLASTY WITH STENT PLACEMENT     • KIDNEY STONE SURGERY             Review of Systems   Constitution: Positive for malaise/fatigue. Negative for fever.   HENT: Negative for ear pain, hearing loss, nosebleeds and sore throat.    Eyes: Negative for double vision, pain, vision loss in left eye, vision loss in right eye and visual disturbance.   Cardiovascular: Negative for claudication and leg swelling.   Respiratory: Negative for cough, snoring and wheezing.    Endocrine: Negative for cold intolerance, heat intolerance and polyuria.   Skin: Negative for color change, itching and rash.   Musculoskeletal: Negative for joint pain, joint swelling and muscle cramps.   Gastrointestinal: Negative for abdominal pain, diarrhea, melena, nausea and vomiting.   Genitourinary: Negative for bladder incontinence and hematuria.   Neurological: Negative for excessive daytime sleepiness,  "dizziness, light-headedness, paresthesias and seizures.   Psychiatric/Behavioral: Negative for depression. The patient is not nervous/anxious.    All other systems reviewed and are negative.    All other systems reviewed and are negative    No Known Allergies    All aspects of family and social history reviewed.           Objective:     Vitals:    04/24/18 1139   BP: 140/64   BP Location: Right arm   Pulse: 74   Weight: 98.4 kg (217 lb)   Height: 170.2 cm (67\")     Body mass index is 33.99 kg/m².    PHYSICAL EXAM:  Physical Exam   Constitutional: He is oriented to person, place, and time. He appears well-developed and well-nourished.   HENT:   Head: Normocephalic and atraumatic.   Neck: Neck supple. No JVD present.   Cardiovascular: Normal rate, regular rhythm, normal heart sounds and intact distal pulses.    Pulses:       Carotid pulses are 2+ on the right side, and 2+ on the left side.       Radial pulses are 2+ on the right side, and 2+ on the left side.        Dorsalis pedis pulses are 2+ on the right side, and 2+ on the left side.   Right wrist site well approximated. Sensation intact. Good pulse   Pulmonary/Chest: Effort normal and breath sounds normal. No accessory muscle usage. No respiratory distress. He has no rales.   Abdominal: Soft. Normal appearance and bowel sounds are normal. There is no tenderness.   Musculoskeletal: Normal range of motion. He exhibits no edema.   Neurological: He is alert and oriented to person, place, and time.   Skin: Skin is warm, dry and intact. He is not diaphoretic.   Psychiatric: He has a normal mood and affect. His speech is normal and behavior is normal. Judgment and thought content normal. Cognition and memory are normal.         ECG 12 Lead  Date/Time: 4/24/2018 1:26 PM  Performed by: DAKOTA MATTA  Authorized by: DAKOTA MATTA   Comparison: compared with previous ECG from 4/17/2018  Similar to previous ECG  Rhythm: sinus rhythm  Rate: normal  BPM: " 74  Conduction: conduction normal  ST Segments: ST segments normal  T Waves: T waves normal  QRS axis: normal  Other: no other findings  Clinical impression: normal ECG  Comments: Indication: CAD                Assessment:       Diagnosis Plan   1. Coronary artery disease involving native heart without angina pectoris, unspecified vessel or lesion type     2. Essential hypertension     3. Transient cerebral ischemia, unspecified type     4. Hyperlipidemia, unspecified hyperlipidemia type     5. Stenosis of carotid artery, unspecified laterality     6. Type 2 diabetes mellitus with other specified complication, unspecified long term insulin use status          Orders Placed This Encounter   Procedures   • ECG 12 Lead     This order was created via procedure documentation       Current Outpatient Prescriptions   Medication Sig Dispense Refill   • aspirin 81 MG EC tablet Take 1 tablet by mouth Daily. 30 tablet 0   • atorvastatin (LIPITOR) 80 MG tablet Take 1 tablet by mouth Every Night. 30 tablet 0   • carvedilol (COREG) 25 MG tablet Take 1 tablet by mouth Every 12 (Twelve) Hours. 60 tablet 0   • clopidogrel (PLAVIX) 75 MG tablet Take 1 tablet by mouth Daily. 30 tablet 0   • diphenhydrAMINE (BENADRYL) 25 mg capsule Take 25 mg by mouth At Night As Needed for Allergies.     • insulin detemir (LEVEMIR) 100 UNIT/ML injection Inject 20 Units under the skin Every Night. 2 pen 0   • metFORMIN (GLUCOPHAGE) 500 MG tablet Take 1 tablet by mouth 2 (Two) Times a Day With Meals. 60 tablet 0   • valsartan (DIOVAN) 320 MG tablet Take 1 tablet by mouth Daily. 30 tablet 0   • vitamin B-12 (VITAMIN B-12) 1000 MCG tablet Take 1 tablet by mouth Daily. 30 tablet 0     No current facility-administered medications for this visit.             Plan:       1. Coronary Artery Disease  Assessment  • The patient has no angina  • There is a new diagnosis of stable angina in the past 12 months    Plan  • Lifestyle modifications discussed include  adhering to a heart healthy diet, maintenance of a healthy weight, medication compliance, regular exercise and regular monitoring of cholesterol and blood pressure    Subjective - Objective  • There has been a previous stent procedure using KD  • Current antiplatelet therapy includes aspirin 81 mg and clopidogrel 75 mg  • The patient qualifies for cardiac rehabilitation, and has been referred to cardiac rehab        Drug-eluting stent placed to the left circumflex.  On dual antiplatelet therapy.  Tolerating well.  Denies angina.  Considering going to [facility] cardiac rehabilitation.    2. HTN-better controlled. Does not check at home. Having fatigue. Continue to monitor.  Continue carvedilol 25 mg twice a day and valsartan 320 mg daily    3. TIA-has zio in place to r/o arrythmia.  No acute findings.  He does have severe focal stenosis of the left P2 segment.  50% narrowing of the right ICA    4.  Hyperlipidemia-on atorvastatin    5.  Diabetes    Follow up in office in 6 weeks    As always, it has been a pleasure to participate in this patient's care.      Sincerely,      ISABELLA Cervantes

## 2018-05-07 PROCEDURE — 0298T HOLTER MONITOR - 72 HOUR UP TO 21 DAY: CPT | Performed by: INTERNAL MEDICINE

## 2018-05-09 ENCOUNTER — TELEPHONE (OUTPATIENT)
Dept: CARDIOLOGY | Facility: CLINIC | Age: 63
End: 2018-05-09

## 2018-05-24 ENCOUNTER — TELEPHONE (OUTPATIENT)
Dept: NEUROLOGY | Facility: CLINIC | Age: 63
End: 2018-05-24

## 2018-05-24 NOTE — TELEPHONE ENCOUNTER
I s/w pt and scheduled his 3 month f/u with Lyly on 7/16/18 at 10am. Mailed packet today as well.

## 2018-05-24 NOTE — TELEPHONE ENCOUNTER
"----- Message from ISABELLA Wakefield sent at 5/18/2018  9:36 AM EDT -----  Contact: [phone]-PATIENT  The discharge paperwork states to call ASAP for a \"3 month follow-up\", so I can see this johnson for a regular 3-month follow-up.    ----- Message -----  From: Petty Cardenas MA  Sent: 5/17/2018  10:19 AM  To: ISABELLA Wakefield Dr. was the only one who saw pt in hospital. He wrote for pt to call to schedule an appt asap after d/c?     ----- Message -----  From: Latonia Lee MA  Sent: 5/16/2018   1:04 PM  To: Petty Cardenas MA    Patient calling to schedule a follow up appointment for TIA with stroke team. Please call him to schedule.       "

## 2018-06-06 ENCOUNTER — OFFICE VISIT (OUTPATIENT)
Dept: CARDIOLOGY | Facility: CLINIC | Age: 63
End: 2018-06-06

## 2018-06-06 VITALS
SYSTOLIC BLOOD PRESSURE: 160 MMHG | HEART RATE: 69 BPM | DIASTOLIC BLOOD PRESSURE: 80 MMHG | BODY MASS INDEX: 35 KG/M2 | HEIGHT: 67 IN | RESPIRATION RATE: 16 BRPM | WEIGHT: 223 LBS

## 2018-06-06 DIAGNOSIS — G45.9 TRANSIENT CEREBRAL ISCHEMIA, UNSPECIFIED TYPE: ICD-10-CM

## 2018-06-06 DIAGNOSIS — E78.5 HYPERLIPIDEMIA, UNSPECIFIED HYPERLIPIDEMIA TYPE: ICD-10-CM

## 2018-06-06 DIAGNOSIS — I10 ESSENTIAL HYPERTENSION: ICD-10-CM

## 2018-06-06 DIAGNOSIS — I25.10 CORONARY ARTERY DISEASE INVOLVING NATIVE HEART WITHOUT ANGINA PECTORIS, UNSPECIFIED VESSEL OR LESION TYPE: Primary | Chronic | ICD-10-CM

## 2018-06-06 PROCEDURE — 93000 ELECTROCARDIOGRAM COMPLETE: CPT | Performed by: INTERNAL MEDICINE

## 2018-06-06 PROCEDURE — 99214 OFFICE O/P EST MOD 30 MIN: CPT | Performed by: INTERNAL MEDICINE

## 2018-06-06 RX ORDER — AMLODIPINE BESYLATE 5 MG/1
5 TABLET ORAL DAILY
COMMUNITY
Start: 2018-05-01 | End: 2019-05-01

## 2018-06-06 NOTE — PROGRESS NOTES
PATIENTINFORMATION    Date of Office Visit: 2018  Encounter Provider: Ashley Alba MD  Place of Service: [facility] CARDIOLOGY  Patient Name: Matt Cruz  : 1955    Subjective:     Encounter Date:2018      Patient ID: Matt Cruz is a 62 y.o. male.      History of Present Illness    He has a history of coronary disease, status post a stent to the mid LAD, hypertension, TIA, diabetes, hyperlipidemia, and carotid stenosis. He was admitted in 2018 with strokelike symptoms.    In , he did have cardiac cath showing 3-vessel disease with a 90% stenosis in the mid LAD, RCA occlusion and 30% stenosis of the left circumflex with an EF of 50% to 55%, angioplasty and drug-eluting stent of the mid LAD. However, the angioplasty was unsuccessful of the RCA. Medical management was recommended.  An echocardiogram was performed on 2018 showing mild concentric hypertrophy and an EF of 57%. He also had some anginal chest pain. He did undergo a cardiac catheterization showing severe 3-vessel coronary disease with an occluded RCA, high-grade mid left circumflex, and diffuse LAD and patent LAD. He had a drug-eluting stent placed to the mid left circumflex, placed on dual antiplatelet therapy. He was sent home with a Zio patch which did not show arrhythmia. CT angiogram of the head and neck was normal without acute intracranial hemorrhage or stroke. He did have a severe focal stenosis of the left P2 segment.    He comes in today for follow-up.  His blood pressures been elevated.  He denies palpitations, shortness of breath, lightheadedness or chest pain.  He has fatigue which he thinks is improving and he is swelling around his ankles.    Review of Systems   Constitution: Negative for fever, malaise/fatigue, weight gain and weight loss.   HENT: Negative for ear pain, hearing loss, nosebleeds and sore throat.    Eyes: Negative for double vision, pain, vision loss in  "left eye and vision loss in right eye.   Cardiovascular: Positive for leg swelling.        See history of present illness.   Respiratory: Positive for snoring. Negative for cough, shortness of breath, sleep disturbances due to breathing and wheezing.    Endocrine: Negative for cold intolerance, heat intolerance and polyuria.   Skin: Negative for itching, poor wound healing and rash.   Musculoskeletal: Negative for joint pain, joint swelling and myalgias.   Gastrointestinal: Negative for abdominal pain, diarrhea, hematochezia, nausea and vomiting.   Genitourinary: Negative for hematuria and hesitancy.   Neurological: Negative for numbness, paresthesias and seizures.   Psychiatric/Behavioral: Negative for depression. The patient is not nervous/anxious.            ECG 12 Lead  Date/Time: 6/6/2018 12:37 PM  Performed by: DOUG PARKER  Authorized by: DOUG PARKER   Comparison: compared with previous ECG from 4/24/2018  Similar to previous ECG  Rhythm: sinus rhythm  BPM: 69  Conduction: conduction normal  ST Segments: ST segments normal  T Waves: T waves normal  Clinical impression: normal ECG               Objective:     /80 (BP Location: Right arm, Patient Position: Sitting, Cuff Size: Adult)   Pulse 69   Resp 16   Ht 170.2 cm (67\")   Wt 101 kg (223 lb)   BMI 34.93 kg/m²  Body mass index is 34.93 kg/m².     Physical Exam   Constitutional: He appears well-developed.   HENT:   Head: Normocephalic and atraumatic.   Eyes: Conjunctivae and lids are normal. Pupils are equal, round, and reactive to light. Lids are everted and swept, no foreign bodies found.   Neck: Normal range of motion. No JVD present. Carotid bruit is not present. No tracheal deviation present. No thyroid mass present.   Cardiovascular: Normal rate, regular rhythm and normal heart sounds.    Pulses:       Dorsalis pedis pulses are 2+ on the right side, and 2+ on the left side.   Pulmonary/Chest: Effort normal and breath sounds normal. "   Abdominal: Normal appearance and bowel sounds are normal.   Musculoskeletal: Normal range of motion.   Neurological: He is alert. He has normal strength.   Skin: Skin is warm, dry and intact.   Psychiatric: He has a normal mood and affect. His behavior is normal.   Vitals reviewed.          Assessment/Plan:       1. Coronary Artery Disease  Assessment  • The patient has no angina  • There is a new diagnosis of stable angina in the past 12 months    Plan  • Lifestyle modifications discussed include adhering to a heart healthy diet, maintenance of a healthy weight, medication compliance, regular exercise and regular monitoring of cholesterol and blood pressure    Subjective - Objective  • There has been a previous stent procedure using KD  • Current antiplatelet therapy includes aspirin 81 mg and clopidogrel 75 mg  • The patient qualifies for cardiac rehabilitation, and has been referred to cardiac rehab      He is doing well with rehabilitation.     2. HTN.      3. TIA. He does have severe focal stenosis of the left P2 segment.  50% narrowing of the right ICA     4.  Hyperlipidemia. On atorvastatin     5.  Diabetes    I will see him back in 6 months.    Orders Placed This Encounter   Procedures   • ECG 12 Lead     This order was created via procedure documentation      Matt Cruz   Home Medication Instructions ML:    Printed on:06/07/18 3825   Medication Information                      amLODIPine (NORVASC) 5 MG tablet  Take 5 mg by mouth.             aspirin 81 MG EC tablet  Take 1 tablet by mouth Daily.             atorvastatin (LIPITOR) 80 MG tablet  Take 1 tablet by mouth Every Night.             carvedilol (COREG) 25 MG tablet  Take 1 tablet by mouth Every 12 (Twelve) Hours.             clopidogrel (PLAVIX) 75 MG tablet  Take 1 tablet by mouth Daily.             diphenhydrAMINE (BENADRYL) 25 mg capsule  Take 25 mg by mouth At Night As Needed for Allergies.             insulin detemir (LEVEMIR) 100  UNIT/ML injection  Inject 20 Units under the skin Every Night.             metFORMIN (GLUCOPHAGE) 1000 MG tablet  Take 1,000 mg by mouth.             valsartan (DIOVAN) 320 MG tablet  Take 1 tablet by mouth Daily.             vitamin B-12 (VITAMIN B-12) 1000 MCG tablet  Take 1 tablet by mouth Daily.                        Ashley Alba MD  06/07/18  4:03 PM

## 2018-07-16 ENCOUNTER — OFFICE VISIT (OUTPATIENT)
Dept: NEUROLOGY | Facility: CLINIC | Age: 63
End: 2018-07-16

## 2018-07-16 VITALS
SYSTOLIC BLOOD PRESSURE: 140 MMHG | HEIGHT: 67 IN | DIASTOLIC BLOOD PRESSURE: 64 MMHG | OXYGEN SATURATION: 96 % | BODY MASS INDEX: 35.31 KG/M2 | WEIGHT: 225 LBS | HEART RATE: 85 BPM

## 2018-07-16 DIAGNOSIS — E11.69 TYPE 2 DIABETES MELLITUS WITH OTHER SPECIFIED COMPLICATION, UNSPECIFIED LONG TERM INSULIN USE STATUS: ICD-10-CM

## 2018-07-16 DIAGNOSIS — I10 ESSENTIAL HYPERTENSION: ICD-10-CM

## 2018-07-16 DIAGNOSIS — I25.10 CORONARY ARTERY DISEASE INVOLVING NATIVE HEART WITHOUT ANGINA PECTORIS, UNSPECIFIED VESSEL OR LESION TYPE: Chronic | ICD-10-CM

## 2018-07-16 DIAGNOSIS — G45.8 OTHER SPECIFIED TRANSIENT CEREBRAL ISCHEMIAS: Primary | ICD-10-CM

## 2018-07-16 DIAGNOSIS — E78.5 HYPERLIPIDEMIA, UNSPECIFIED HYPERLIPIDEMIA TYPE: ICD-10-CM

## 2018-07-16 PROCEDURE — 99213 OFFICE O/P EST LOW 20 MIN: CPT | Performed by: NURSE PRACTITIONER

## 2018-07-16 RX ORDER — PEN NEEDLE, DIABETIC 31 G X1/4"
1 NEEDLE, DISPOSABLE MISCELLANEOUS
Status: ON HOLD | COMMUNITY
Start: 2018-07-12 | End: 2022-10-27

## 2018-07-16 NOTE — PROGRESS NOTES
"DOS: 2018  NAME: Matt Cruz   : 1955  PCP: ISABELLA Jane    Chief Complaint   Patient presents with   • Transient Ischemic Attack      Neurological Problem and Interval History:  62 y.o. RHW male with HTN, HLD, DM and CAD (s/p stents , ) who presents today for follow-up of TIA.     Mr. Cruz presented to [facility] on 18 with an episode of perioral numbness, right arm numbness and speech difficulty lasting about 5 minutes. He denies any weakness at that time. He was evaluated by Dr. Gandhi. His imaging was negative for an acute stroke. His vessel imaging showed a 50% right ICA narrowing and severe focal stenosis of left P2 segment. His echocardiogram was unrevealing for source of stroke. He did undergo a cardiac cath with subsequent stent placement during his admission. He was on ASA prior and was discharged on ASA, Plavix and Lipitor along with prolonged cardiac monitoring.    He presents today on the same medication regimen and is tolerating well. He does report an occasional headache that can some on somewhat suddenly over his right forehead but they are only mild in intensity. He denies any associated symptoms. He denies any recurrent numbness, weakness, speech difficulty or any new stroke/TIA symptoms. He has followed up with cardiology with reportedly normal Holter results. He denies any other changes in his health. He aHe does not take his BP regularly but states it is \"better controlled than before stroke\". He does not take his blood sugar regularly either. He denies smoking. Occasional alcohol use. No family history of stroke.    Review of Systems:        Review of Systems   Constitutional: Negative for chills, fatigue and fever.   HENT: Positive for tinnitus (once about 1 week ago). Negative for hearing loss and trouble swallowing.    Eyes: Negative for pain, redness and itching.   Respiratory: Negative for cough, choking, shortness of breath and wheezing.  "   Cardiovascular: Positive for leg swelling (ankles). Negative for chest pain and palpitations.   Gastrointestinal: Negative for constipation, diarrhea, nausea and vomiting.   Endocrine: Negative for cold intolerance, heat intolerance and polydipsia.   Genitourinary: Negative for decreased urine volume, difficulty urinating, frequency and urgency.   Musculoskeletal: Positive for back pain. Negative for gait problem, neck pain and neck stiffness.   Skin: Negative for color change, rash and wound.   Allergic/Immunologic: Negative for environmental allergies, food allergies and immunocompromised state.   Neurological: Positive for weakness (right arm). Negative for dizziness, tremors, seizures, syncope, speech difficulty, light-headedness and headaches.   Hematological: Negative for adenopathy. Does not bruise/bleed easily.   Psychiatric/Behavioral: Negative for agitation, behavioral problems, confusion, decreased concentration, dysphoric mood, hallucinations, self-injury, sleep disturbance and suicidal ideas. The patient is not nervous/anxious and is not hyperactive.          Current Outpatient Prescriptions:   •  amLODIPine (NORVASC) 5 MG tablet, Take 5 mg by mouth Daily., Disp: , Rfl:   •  aspirin 81 MG EC tablet, Take 1 tablet by mouth Daily., Disp: 30 tablet, Rfl: 0  •  atorvastatin (LIPITOR) 80 MG tablet, Take 1 tablet by mouth Every Night., Disp: 30 tablet, Rfl: 0  •  carvedilol (COREG) 25 MG tablet, Take 1 tablet by mouth Every 12 (Twelve) Hours., Disp: 60 tablet, Rfl: 0  •  clopidogrel (PLAVIX) 75 MG tablet, Take 1 tablet by mouth Daily., Disp: 30 tablet, Rfl: 0  •  diphenhydrAMINE (BENADRYL) 25 mg capsule, Take 25 mg by mouth At Night As Needed for Allergies., Disp: , Rfl:   •  insulin detemir (LEVEMIR) 100 UNIT/ML injection, Inject 20 Units under the skin Every Night., Disp: 2 pen, Rfl: 0  •  Insulin Pen Needle (PEN NEEDLES) 31G X 6 MM misc, Inject 1 each under the skin., Disp: , Rfl:   •  metFORMIN  "(GLUCOPHAGE) 1000 MG tablet, Take 1,000 mg by mouth., Disp: , Rfl:   •  valsartan (DIOVAN) 320 MG tablet, Take 1 tablet by mouth Daily., Disp: 30 tablet, Rfl: 0  •  vitamin B-12 (VITAMIN B-12) 1000 MCG tablet, Take 1 tablet by mouth Daily., Disp: 30 tablet, Rfl: 0    \"The following portions of the patient's history were reviewed and updated as appropriate: allergies, current medications, past family history, past medical history, past social history, past surgical history and problem list.\"  Review and Interpretation of Imaging:  CT head 4/13/18:IMPRESSION:  No evidence for acute intracranial pathology. If there remains clinical  concern for a CT occult infarct, further evaluation could be performed  with MR imaging.  MRI brain 4/13/18:The diffusion-weighted images, in correlation with ADC mapping, show no  restricted diffusion to suggest acute infarct. The exam is limited, as  described above. Follow-up/further evaluation can be obtained as  indications persist.  CTA head/neck 4/13/18:   1. Severe focal stenosis of the left P2 segment.  50% narrowing of the  right ICA lacerum segment. Otherwise, no hemodynamically significant  focal stenosis, aneurysm, or dissection in the cervical carotid or  vertebral artery is in the arteries at the base of the brain.  2. No acute intracranial hemorrhage or hydrocephalus. No enhancing  lesion in brain. If there is further clinical concern, follow-up MRI  could be considered for further evaluation.    EKG 5/13/18: SR  TTE 4/13/18: LVEF 57%, mild concentric hypertrophy; RV normal size; LA normal size, saline tests negative, RA normal size  ZIO Patch 4/17/18: The patient was monitored for 6 days 19 hours. The predominant rhythm noted during the testing period was sinus rhythm. Premature atrial contractions occured rarely. There was no evidence of atrial arrhythmias. There were no episodes of supraventricular tachycardia. Premature ventricular contractions occured rarely. There were " no episodes of ventricular tachycardia.    Laboratory Results:             Lab Results   Component Value Date    HGBA1C 11.30 (H) 04/14/2018         Lab Results   Component Value Date    CHOL 217 (H) 04/14/2018         Lab Results   Component Value Date    HDL 31 (L) 04/14/2018         Lab Results   Component Value Date    LDL  04/14/2018      Comment:      Unable to calculate     (H) 04/14/2018         Lab Results   Component Value Date    TRIG 430 (H) 04/14/2018     No results found for: RPR  Lab Results   Component Value Date    TSH 2.730 04/14/2018     Lab Results   Component Value Date    UQULXJBC24 451 04/14/2018     Lab Results   Component Value Date    CHOL 217 (H) 04/14/2018    TRIG 430 (H) 04/14/2018    HDL 31 (L) 04/14/2018    LDL  04/14/2018      Comment:      Unable to calculate     (H) 04/14/2018     Lab Results   Component Value Date    JSGZJXXO45 451 04/14/2018     Physical Examination: NIHSS: 0 mRS: 0  General Appearance:   Well developed, well nourished, well groomed, alert, and cooperative.  HEENT: Normocephalic.   Neck and Spine: Normal range of motion.  Normal alignment. No mass or tenderness. No bruits.  Cardiac: Regular rate and rhythm. No murmurs.  Peripheral Vasculature: Radial pulses are equal and symmetric. No signs of distal embolization.  Extremities:    No edema or deformities. Normal joint ROM.  Skin:    No rashes or birth marks. BLE dry, flaky plaques    Neurological examination:  Higher Integrative  Function: Oriented to time, place and person. Normal registration, recall (3/3 with 1 clue), attention span and concentration. Normal language including comprehension, spontaneous speech, repetition, reading, writing, naming and vocabulary. No neglect with normal visual-spatial function and construction. Normal fund of knowledge and higher integrative function.  CN II: Pupils are equal, round, and reactive to light. Normal visual acuity and visual fields.    CN III IV  VI: Extraocular movements are full without nystagmus.   CN V: Normal facial sensation and strength of muscles of mastication.  CN VII: Facial movements are symmetric. No weakness.  CN VIII:   Auditory acuity is normal.  CN IX & X:   Symmetric palatal movement.  CN XI: Sternocleidomastoid and trapezius are normal.  No weakness.  CN XII:   The tongue is midline.  No atrophy or fasciculations.  Motor: Normal muscle strength, bulk and tone in upper and lower extremities except for very subtle right pronator drift.  No fasciculations, rigidity, spasticity, or abnormal movements.   Sensation: Normal to light touch,temperature, and proprioception in arms and legs. Normal graphesthesia and no extinction on DSS.  Station and Gait: Normal gait and station.    Coordination: Finger to nose test shows no dysmetria.Heel to shin normal.    Diagnoses / Discussion:  Mr. Cruz is doing well following his presumed left hemispheric TIA suffered in April of this year and has essentially remained at his neurologic baseline. The etiology of his event is likely due to his uncontrolled risk factors as evidenced by his LDL of 134 and A1C of 11.3.  His cardiac work-up was unrevealing including TTE and prolonged cardiac monitoring that were negative for any evidence of atrial fibrillation. However he did undergo cardiac stent placement during his hospitalization. I dicussed with patient at length the importance of risk factor control including BP, Blood sugar and cholesterol control. He will monitor his BP regulaly and record. He will also follow-up with his PCP for repeat lipid panel/LFTs with goal LDL < 70. I also encouraged him to have an evaluation for possible sleep apnea. Continue current medication regimen. We discussed importance of calling 911 for any signs/symptoms of stroke. Follow-up in 9 months, sooner if symptoms warrant.     Plan:   Continue ASA, Plavix and Lipitor   Repeat Lipid panel and check LFTs -- patient to f/u with  PCP   Recommend sleep study evaluation -- referrral if needed.    Monitor BP regularly and record    Blood pressure control to <130/80   Goal LDL <70-recommend high dose statins-   Recommend referral to endocrinology -- patient to consider    Serum glucose < 140       Call 911 for stroke any stroke symptoms   Follow-up in April next year  Matt was seen today for transient ischemic attack.    Diagnoses and all orders for this visit:    Other specified transient cerebral ischemias    Essential hypertension    Hyperlipidemia, unspecified hyperlipidemia type    Coronary artery disease involving native heart without angina pectoris, unspecified vessel or lesion type    Type 2 diabetes mellitus with other specified complication, unspecified long term insulin use status (CMS/[facility])        Coding

## 2019-01-11 ENCOUNTER — OFFICE VISIT (OUTPATIENT)
Dept: CARDIOLOGY | Facility: CLINIC | Age: 64
End: 2019-01-11

## 2019-01-11 VITALS
HEART RATE: 62 BPM | RESPIRATION RATE: 16 BRPM | DIASTOLIC BLOOD PRESSURE: 60 MMHG | BODY MASS INDEX: 34.97 KG/M2 | HEIGHT: 67 IN | WEIGHT: 222.8 LBS | SYSTOLIC BLOOD PRESSURE: 120 MMHG

## 2019-01-11 DIAGNOSIS — G45.9 TIA (TRANSIENT ISCHEMIC ATTACK): ICD-10-CM

## 2019-01-11 DIAGNOSIS — E78.5 HYPERLIPIDEMIA, UNSPECIFIED HYPERLIPIDEMIA TYPE: ICD-10-CM

## 2019-01-11 DIAGNOSIS — I10 ESSENTIAL HYPERTENSION: ICD-10-CM

## 2019-01-11 DIAGNOSIS — I65.29 STENOSIS OF CAROTID ARTERY, UNSPECIFIED LATERALITY: ICD-10-CM

## 2019-01-11 DIAGNOSIS — I25.10 CORONARY ARTERY DISEASE INVOLVING NATIVE HEART WITHOUT ANGINA PECTORIS, UNSPECIFIED VESSEL OR LESION TYPE: Primary | Chronic | ICD-10-CM

## 2019-01-11 DIAGNOSIS — E11.69 TYPE 2 DIABETES MELLITUS WITH OTHER SPECIFIED COMPLICATION, UNSPECIFIED WHETHER LONG TERM INSULIN USE (HCC): ICD-10-CM

## 2019-01-11 PROCEDURE — 93000 ELECTROCARDIOGRAM COMPLETE: CPT | Performed by: INTERNAL MEDICINE

## 2019-01-11 PROCEDURE — 99214 OFFICE O/P EST MOD 30 MIN: CPT | Performed by: INTERNAL MEDICINE

## 2019-01-11 RX ORDER — ICOSAPENT ETHYL 1000 MG/1
2 CAPSULE ORAL
COMMUNITY
Start: 2018-08-09 | End: 2022-09-06 | Stop reason: ALTCHOICE

## 2019-01-11 NOTE — PROGRESS NOTES
PATIENTINFORMATION    Date of Office Visit: 2019  Encounter Provider: Ashley Alba MD  Place of Service: [facility] CARDIOLOGY  Patient Name: Matt Cruz  : 1955    Subjective:     Encounter Date:2019      Patient ID: Matt Cruz is a 63 y.o. male.      History of Present Illness    He has a history of coronary disease, status post a stent to the mid LAD, hypertension, TIA, diabetes, hyperlipidemia, and carotid stenosis. He was admitted in 2018 with strokelike symptoms.     In , he did have cardiac cath showing 3-vessel disease with a 90% stenosis in the mid LAD, RCA occlusion and 30% stenosis of the left circumflex with an EF of 50% to 55%, angioplasty and drug-eluting stent of the mid LAD. However, the angioplasty was unsuccessful of the RCA. Medical management was recommended.  An echocardiogram was performed on 2018 showing mild concentric hypertrophy and an EF of 57%. He also had some anginal chest pain. He did undergo a cardiac catheterization showing severe 3-vessel coronary disease with an occluded RCA, high-grade mid left circumflex, and diffuse LAD and patent LAD. He had a drug-eluting stent placed to the mid left circumflex, placed on dual antiplatelet therapy. He was sent home with a Zio patch which did not show arrhythmia. CT angiogram of the head and neck was normal without acute intracranial hemorrhage or stroke. He did have a severe focal stenosis of the left P2 segment.    He comes in today for followup. Unfortunately he lost his job in the summer and has been on unemployment. This has kind of left him depressed. He is unfortunately not exercising. He is not having any symptoms to suggest a recurrence of his coronary artery disease. He denies palpitations or shortness of breath or lightheadedness.          Review of Systems   Constitution: Negative for fever, malaise/fatigue, weight gain and weight loss.   HENT: Negative for  "ear pain, hearing loss, nosebleeds and sore throat.    Eyes: Negative for double vision, pain, vision loss in left eye and vision loss in right eye.   Cardiovascular:        See history of present illness.   Respiratory: Negative for cough, shortness of breath, sleep disturbances due to breathing, snoring and wheezing.    Endocrine: Negative for cold intolerance, heat intolerance and polyuria.   Skin: Negative for itching, poor wound healing and rash.   Musculoskeletal: Negative for joint pain, joint swelling and myalgias.   Gastrointestinal: Negative for abdominal pain, diarrhea, hematochezia, nausea and vomiting.   Genitourinary: Negative for hematuria and hesitancy.   Neurological: Negative for numbness, paresthesias and seizures.   Psychiatric/Behavioral: Positive for depression. The patient is not nervous/anxious.            ECG 12 Lead  Date/Time: 1/11/2019 10:41 AM  Performed by: Ashley Alba MD  Authorized by: Ashley Alba MD   Comparison: compared with previous ECG from 6/6/2018  Similar to previous ECG  Rhythm: sinus rhythm  BPM: 62  Conduction: 1st degree  ST Segments: ST segments normal  T Waves: T waves normal  Clinical impression: normal ECG               Objective:     /60 (BP Location: Right arm, Patient Position: Sitting, Cuff Size: Adult)   Pulse 62   Resp 16   Ht 170.2 cm (67\")   Wt 101 kg (222 lb 12.8 oz)   BMI 34.90 kg/m²  Body mass index is 34.9 kg/m².     Physical Exam   Constitutional: He appears well-developed.   HENT:   Head: Normocephalic and atraumatic.   Eyes: Conjunctivae and lids are normal. Pupils are equal, round, and reactive to light. Lids are everted and swept, no foreign bodies found.   Neck: Normal range of motion. No JVD present. Carotid bruit is not present. No tracheal deviation present. No thyroid mass present.   Cardiovascular: Normal rate, regular rhythm and normal heart sounds.   Pulses:       Dorsalis pedis pulses are 2+ on the right side, and 2+ on " the left side.   Trace bilateral lower extremity edema   Pulmonary/Chest: Effort normal and breath sounds normal.   Abdominal: Normal appearance and bowel sounds are normal.   Musculoskeletal: Normal range of motion.   Neurological: He is alert. He has normal strength.   Skin: Skin is warm, dry and intact.   Psychiatric: He has a normal mood and affect. His behavior is normal.   Vitals reviewed.        Lab Review:  I reviewed his most recent laboratory data.  LDL at goal.  Triglycerides elevated.  No hemoglobin A1c      Assessment/Plan:       1. Coronary Artery Disease  Assessment  • The patient has no angina  • There is a new diagnosis of stable angina in the past 12 months    Plan  • Lifestyle modifications discussed include adhering to a heart healthy diet, maintenance of a healthy weight, medication compliance, regular exercise and regular monitoring of cholesterol and blood pressure    Subjective - Objective  • There has been a previous stent procedure using KD on or around 4/16/2018  • Current antiplatelet therapy includes aspirin 81 mg and clopidogrel 75 mg       2. HTN.  controlled     3. TIA. He does have severe focal stenosis of the left P2 segment.  50% narrowing of the right ICA     4.  Hyperlipidemia. On atorvastatin     5.  Diabetes    Will follow-up with the nurse practitioner in one year.     I recommend he continue Plavix though it is okay.  He is to hold it for a procedure once he is passed one year ariel.    Orders Placed This Encounter   Procedures   • ECG 12 Lead     This order was created via procedure documentation        Discharge Medications           Accurate as of 1/11/19 11:13 AM. If you have any questions, ask your nurse or doctor.               Continue These Medications      Instructions Start Date   amLODIPine 5 MG tablet  Commonly known as:  NORVASC   5 mg, Oral, Daily      aspirin 81 MG EC tablet   81 mg, Oral, Daily      atorvastatin 80 MG tablet  Commonly known as:  LIPITOR   80  mg, Oral, Nightly      carvedilol 25 MG tablet  Commonly known as:  COREG   25 mg, Oral, Every 12 Hours Scheduled      clopidogrel 75 MG tablet  Commonly known as:  PLAVIX   75 mg, Oral, Daily      cyanocobalamin 1000 MCG tablet  Commonly known as:  VITAMIN B-12   1,000 mcg, Oral, Daily      diphenhydrAMINE 25 mg capsule  Commonly known as:  BENADRYL   25 mg, Oral, Nightly PRN      insulin detemir 100 UNIT/ML injection  Commonly known as:  LEVEMIR   20 Units, Subcutaneous, Nightly      metFORMIN 1000 MG tablet  Commonly known as:  GLUCOPHAGE   1,000 mg, Oral      Pen Needles 31G X 6 MM misc   1 each, Subcutaneous      valsartan 320 MG tablet  Commonly known as:  DIOVAN   320 mg, Oral, Every 24 Hours Scheduled      VASCEPA 1 g capsule capsule  Generic drug:  icosapent ethyl   2 g, Oral                    Ashley Alba MD  01/11/19  11:13 AM

## 2021-03-19 ENCOUNTER — BULK ORDERING (OUTPATIENT)
Dept: CASE MANAGEMENT | Facility: OTHER | Age: 66
End: 2021-03-19

## 2021-03-19 DIAGNOSIS — Z23 IMMUNIZATION DUE: ICD-10-CM

## 2021-09-08 ENCOUNTER — APPOINTMENT (OUTPATIENT)
Dept: GENERAL RADIOLOGY | Facility: HOSPITAL | Age: 66
End: 2021-09-08

## 2021-09-08 ENCOUNTER — HOSPITAL ENCOUNTER (INPATIENT)
Facility: HOSPITAL | Age: 66
LOS: 7 days | Discharge: HOME HEALTH | End: 2021-09-15
Attending: EMERGENCY MEDICINE | Admitting: INTERNAL MEDICINE

## 2021-09-08 DIAGNOSIS — R07.9 CHEST PAIN WITH HIGH RISK OF ACUTE CORONARY SYNDROME: Primary | ICD-10-CM

## 2021-09-08 DIAGNOSIS — I21.4 NSTEMI, INITIAL EPISODE OF CARE (HCC): ICD-10-CM

## 2021-09-08 DIAGNOSIS — I25.10 CORONARY ARTERY DISEASE INVOLVING NATIVE HEART WITHOUT ANGINA PECTORIS, UNSPECIFIED VESSEL OR LESION TYPE: ICD-10-CM

## 2021-09-08 DIAGNOSIS — I50.21 ACUTE SYSTOLIC CHF (CONGESTIVE HEART FAILURE) (HCC): ICD-10-CM

## 2021-09-08 DIAGNOSIS — Z95.5 S/P DRUG ELUTING CORONARY STENT PLACEMENT: ICD-10-CM

## 2021-09-08 LAB
ALBUMIN SERPL-MCNC: 3.9 G/DL (ref 3.5–5.2)
ALBUMIN/GLOB SERPL: 1.6 G/DL
ALP SERPL-CCNC: 113 U/L (ref 39–117)
ALT SERPL W P-5'-P-CCNC: 37 U/L (ref 1–41)
ANION GAP SERPL CALCULATED.3IONS-SCNC: 11.3 MMOL/L (ref 5–15)
APTT PPP: 27.8 SECONDS (ref 22.7–35.4)
AST SERPL-CCNC: 22 U/L (ref 1–40)
BASOPHILS # BLD AUTO: 0.07 10*3/MM3 (ref 0–0.2)
BASOPHILS NFR BLD AUTO: 0.8 % (ref 0–1.5)
BILIRUB SERPL-MCNC: 0.4 MG/DL (ref 0–1.2)
BUN SERPL-MCNC: 13 MG/DL (ref 8–23)
BUN/CREAT SERPL: 20 (ref 7–25)
CALCIUM SPEC-SCNC: 9.1 MG/DL (ref 8.6–10.5)
CHLORIDE SERPL-SCNC: 103 MMOL/L (ref 98–107)
CO2 SERPL-SCNC: 25.7 MMOL/L (ref 22–29)
CREAT SERPL-MCNC: 0.65 MG/DL (ref 0.76–1.27)
DEPRECATED RDW RBC AUTO: 43.6 FL (ref 37–54)
EOSINOPHIL # BLD AUTO: 0.13 10*3/MM3 (ref 0–0.4)
EOSINOPHIL NFR BLD AUTO: 1.5 % (ref 0.3–6.2)
ERYTHROCYTE [DISTWIDTH] IN BLOOD BY AUTOMATED COUNT: 13.3 % (ref 12.3–15.4)
GFR SERPL CREATININE-BSD FRML MDRD: 123 ML/MIN/1.73
GLOBULIN UR ELPH-MCNC: 2.4 GM/DL
GLUCOSE BLDC GLUCOMTR-MCNC: 191 MG/DL (ref 70–130)
GLUCOSE SERPL-MCNC: 231 MG/DL (ref 65–99)
HCT VFR BLD AUTO: 51.7 % (ref 37.5–51)
HGB BLD-MCNC: 17.1 G/DL (ref 13–17.7)
HOLD SPECIMEN: NORMAL
HOLD SPECIMEN: NORMAL
IMM GRANULOCYTES # BLD AUTO: 0.03 10*3/MM3 (ref 0–0.05)
IMM GRANULOCYTES NFR BLD AUTO: 0.3 % (ref 0–0.5)
INR PPP: 1 (ref 0.9–1.1)
LYMPHOCYTES # BLD AUTO: 1.62 10*3/MM3 (ref 0.7–3.1)
LYMPHOCYTES NFR BLD AUTO: 18.7 % (ref 19.6–45.3)
MCH RBC QN AUTO: 29.6 PG (ref 26.6–33)
MCHC RBC AUTO-ENTMCNC: 33.1 G/DL (ref 31.5–35.7)
MCV RBC AUTO: 89.4 FL (ref 79–97)
MONOCYTES # BLD AUTO: 0.9 10*3/MM3 (ref 0.1–0.9)
MONOCYTES NFR BLD AUTO: 10.4 % (ref 5–12)
NEUTROPHILS NFR BLD AUTO: 5.93 10*3/MM3 (ref 1.7–7)
NEUTROPHILS NFR BLD AUTO: 68.3 % (ref 42.7–76)
NRBC BLD AUTO-RTO: 0 /100 WBC (ref 0–0.2)
PLATELET # BLD AUTO: 201 10*3/MM3 (ref 140–450)
PMV BLD AUTO: 11.4 FL (ref 6–12)
POTASSIUM SERPL-SCNC: 3.6 MMOL/L (ref 3.5–5.2)
PROT SERPL-MCNC: 6.3 G/DL (ref 6–8.5)
PROTHROMBIN TIME: 13 SECONDS (ref 11.7–14.2)
QT INTERVAL: 358 MS
QT INTERVAL: 393 MS
RBC # BLD AUTO: 5.78 10*6/MM3 (ref 4.14–5.8)
SARS-COV-2 RNA RESP QL NAA+PROBE: NOT DETECTED
SODIUM SERPL-SCNC: 140 MMOL/L (ref 136–145)
TROPONIN T SERPL-MCNC: 0.07 NG/ML (ref 0–0.03)
TROPONIN T SERPL-MCNC: 0.07 NG/ML (ref 0–0.03)
WBC # BLD AUTO: 8.68 10*3/MM3 (ref 3.4–10.8)
WHOLE BLOOD HOLD SPECIMEN: NORMAL
WHOLE BLOOD HOLD SPECIMEN: NORMAL

## 2021-09-08 PROCEDURE — 82962 GLUCOSE BLOOD TEST: CPT

## 2021-09-08 PROCEDURE — 25010000002 HEPARIN (PORCINE) PER 1000 UNITS: Performed by: EMERGENCY MEDICINE

## 2021-09-08 PROCEDURE — 71046 X-RAY EXAM CHEST 2 VIEWS: CPT

## 2021-09-08 PROCEDURE — 84484 ASSAY OF TROPONIN QUANT: CPT

## 2021-09-08 PROCEDURE — 93010 ELECTROCARDIOGRAM REPORT: CPT | Performed by: INTERNAL MEDICINE

## 2021-09-08 PROCEDURE — 93005 ELECTROCARDIOGRAM TRACING: CPT | Performed by: INTERNAL MEDICINE

## 2021-09-08 PROCEDURE — 85025 COMPLETE CBC W/AUTO DIFF WBC: CPT

## 2021-09-08 PROCEDURE — 93005 ELECTROCARDIOGRAM TRACING: CPT

## 2021-09-08 PROCEDURE — 99285 EMERGENCY DEPT VISIT HI MDM: CPT

## 2021-09-08 PROCEDURE — 80053 COMPREHEN METABOLIC PANEL: CPT

## 2021-09-08 PROCEDURE — U0003 INFECTIOUS AGENT DETECTION BY NUCLEIC ACID (DNA OR RNA); SEVERE ACUTE RESPIRATORY SYNDROME CORONAVIRUS 2 (SARS-COV-2) (CORONAVIRUS DISEASE [COVID-19]), AMPLIFIED PROBE TECHNIQUE, MAKING USE OF HIGH THROUGHPUT TECHNOLOGIES AS DESCRIBED BY CMS-2020-01-R: HCPCS | Performed by: EMERGENCY MEDICINE

## 2021-09-08 PROCEDURE — 85730 THROMBOPLASTIN TIME PARTIAL: CPT | Performed by: INTERNAL MEDICINE

## 2021-09-08 PROCEDURE — 36415 COLL VENOUS BLD VENIPUNCTURE: CPT

## 2021-09-08 PROCEDURE — 85730 THROMBOPLASTIN TIME PARTIAL: CPT | Performed by: EMERGENCY MEDICINE

## 2021-09-08 PROCEDURE — 85610 PROTHROMBIN TIME: CPT | Performed by: EMERGENCY MEDICINE

## 2021-09-08 RX ORDER — HEPARIN SODIUM 5000 [USP'U]/ML
39.2 INJECTION, SOLUTION INTRAVENOUS; SUBCUTANEOUS ONCE
Status: COMPLETED | OUTPATIENT
Start: 2021-09-08 | End: 2021-09-08

## 2021-09-08 RX ORDER — HEPARIN SODIUM 5000 [USP'U]/ML
30-39.2 INJECTION, SOLUTION INTRAVENOUS; SUBCUTANEOUS EVERY 6 HOURS PRN
Status: DISCONTINUED | OUTPATIENT
Start: 2021-09-08 | End: 2021-09-15 | Stop reason: HOSPADM

## 2021-09-08 RX ORDER — SODIUM CHLORIDE 0.9 % (FLUSH) 0.9 %
10 SYRINGE (ML) INJECTION AS NEEDED
Status: DISCONTINUED | OUTPATIENT
Start: 2021-09-08 | End: 2021-09-15 | Stop reason: HOSPADM

## 2021-09-08 RX ORDER — ATORVASTATIN CALCIUM 20 MG/1
20 TABLET, FILM COATED ORAL NIGHTLY
Status: DISCONTINUED | OUTPATIENT
Start: 2021-09-08 | End: 2021-09-09

## 2021-09-08 RX ORDER — HEPARIN SODIUM 10000 [USP'U]/100ML
9.8 INJECTION, SOLUTION INTRAVENOUS
Status: DISCONTINUED | OUTPATIENT
Start: 2021-09-08 | End: 2021-09-09

## 2021-09-08 RX ORDER — ASPIRIN 325 MG
325 TABLET ORAL ONCE
Status: DISCONTINUED | OUTPATIENT
Start: 2021-09-08 | End: 2021-09-09 | Stop reason: SDUPTHER

## 2021-09-08 RX ADMIN — ATORVASTATIN CALCIUM 20 MG: 20 TABLET, FILM COATED ORAL at 22:33

## 2021-09-08 RX ADMIN — HEPARIN SODIUM 4000 UNITS: 5000 INJECTION INTRAVENOUS; SUBCUTANEOUS at 17:27

## 2021-09-08 RX ADMIN — METOPROLOL TARTRATE 25 MG: 25 TABLET ORAL at 22:33

## 2021-09-08 RX ADMIN — HEPARIN SODIUM 9.8 UNITS/KG/HR: 10000 INJECTION, SOLUTION INTRAVENOUS at 17:30

## 2021-09-08 RX ADMIN — METOPROLOL TARTRATE 5 MG: 1 INJECTION, SOLUTION INTRAVENOUS at 21:13

## 2021-09-08 RX ADMIN — NITROGLYCERIN 1 INCH: 20 OINTMENT TOPICAL at 17:22

## 2021-09-08 RX ADMIN — METOPROLOL TARTRATE 25 MG: 25 TABLET, FILM COATED ORAL at 18:17

## 2021-09-08 NOTE — ED PROVIDER NOTES
" EMERGENCY DEPARTMENT ENCOUNTER    Room Number:  15/15  Date of encounter:  9/8/2021  PCP: Yelena Jacobson APRN  Historian: Patient      HPI:  Chief Complaint: Chest pain  A complete HPI/ROS/PMH/PSH/SH/FH are unobtainable due to: N/A    Context: aMtt Cruz is a 65 y.o. male who presents to the ED c/o intermittent episodes of chest pain for the past several weeks.  He states the pain is \"tight\", is located in his central chest and does not radiate.  It is exacerbated with exertion, it is associated with shortness of breath and mild nausea as well as diaphoresis.  Symptoms were more severe today, prompting him to call EMS.  In route he received a full dose aspirin and 2 sublingual nitroglycerin with improvement in his pain.  Symptoms have also been improved with rest over the past few weeks.  Currently, he states his pain is approximately a 2 out of 10.    Of note, he has not had any medications for several months.    He has been vaccinated for COVID-19.    He states he does not smoke.      The patient was placed in a mask in triage, hand hygiene was performed before and after my interaction with the patient.  I wore a mask, safety glasses and gloves during my entire interaction with the patient.    PAST MEDICAL HISTORY  Active Ambulatory Problems     Diagnosis Date Noted   • Exertional chest pain 04/13/2018   • Essential hypertension 04/13/2018   • TIA (transient ischemic attack) 04/13/2018   • Type 2 diabetes mellitus with other specified complication (CMS/[facility]) 04/13/2018   • HLD (hyperlipidemia) 04/13/2018   • Carotid stenosis 04/15/2018   • Coronary artery disease 04/17/2018     Resolved Ambulatory Problems     Diagnosis Date Noted   • No Resolved Ambulatory Problems     Past Medical History:   Diagnosis Date   • CAD (coronary artery disease)    • Hyperlipidemia    • Hypertension    • Kidney stone          PAST SURGICAL HISTORY  Past Surgical History:   Procedure Laterality Date   • CARDIAC CATHETERIZATION " Left 2018    Procedure: Cardiac Catheterization/Vascular Study;  Surgeon: Zane De La O MD;  Location:  [facility] CATH INVASIVE LOCATION;  Service: Cardiovascular   • CARDIAC CATHETERIZATION N/A 2018    Procedure: Stent KD coronary;  Surgeon: Zane De La O MD;  Location: [facility] CATH INVASIVE LOCATION;  Service: Cardiovascular   • CORONARY ANGIOPLASTY WITH STENT PLACEMENT     • KIDNEY STONE SURGERY           FAMILY HISTORY  Family History   Problem Relation Age of Onset   • Coronary artery disease Father 60   • Heart attack Brother 48                 SOCIAL HISTORY  Social History     Socioeconomic History   • Marital status:      Spouse name: Not on file   • Number of children: Not on file   • Years of education: Not on file   • Highest education level: Not on file   Tobacco Use   • Smoking status: Former Smoker   • Smokeless tobacco: Never Used   • Tobacco comment: daily caffiene   Substance and Sexual Activity   • Alcohol use: No   • Drug use: No   • Sexual activity: Defer         ALLERGIES  Patient has no known allergies.        REVIEW OF SYSTEMS  Review of Systems   Constitutional: Positive for fatigue. Negative for chills and fever.   Respiratory: Positive for chest tightness and shortness of breath.    Cardiovascular: Positive for chest pain.   Gastrointestinal: Positive for nausea. Negative for abdominal pain, constipation and diarrhea.        All systems reviewed and negative except for those discussed in HPI.       PHYSICAL EXAM    I have reviewed the triage vital signs and nursing notes.    ED Triage Vitals [21 1509]   Temp Heart Rate Resp BP SpO2   97.6 °F (36.4 °C) 104 18 (!) 168/107 93 %      Temp src Heart Rate Source Patient Position BP Location FiO2 (%)   -- -- -- -- --       Physical Exam   Constitutional: Pt. is oriented to person, place, and time and well-developed, well-nourished, and in no distress.  He is disheveled.  HENT: Normocephalic and atraumatic.  Oropharynx moist/nonerythematous.  Neck: Normal range of motion. Neck supple. No JVD present.   Cardiovascular: Normal rate, regular rhythm and normal heart sounds. Exam reveals no gallop and no friction rub.   No murmur heard.  Pulmonary/Chest: Effort normal and breath sounds normal. No stridor. No respiratory distress. No wheezes, no rales.   Abdominal: Soft. Bowel sounds are normal. No distension. There is no tenderness. There is no rebound and no guarding.   Musculoskeletal: Normal range of motion. No edema, tenderness or deformity.   Neurological: Pt. is alert and oriented to person, place, and time.  He has no focal neurologic deficits  Skin: Skin is warm and dry.  He has an eczematous, flaking rash over his lower extremities that he states has been there for years.  Pt. is not diaphoretic. No erythema.   Psychiatric: Mood, affect and judgment normal.  He is pleasant and cooperative.  Nursing note and vitals reviewed.        LAB RESULTS  Recent Results (from the past 24 hour(s))   ECG 12 Lead    Collection Time: 09/08/21  3:14 PM   Result Value Ref Range    QT Interval 358 ms   Comprehensive Metabolic Panel    Collection Time: 09/08/21  3:32 PM    Specimen: Blood   Result Value Ref Range    Glucose 231 (H) 65 - 99 mg/dL    BUN 13 8 - 23 mg/dL    Creatinine 0.65 (L) 0.76 - 1.27 mg/dL    Sodium 140 136 - 145 mmol/L    Potassium 3.6 3.5 - 5.2 mmol/L    Chloride 103 98 - 107 mmol/L    CO2 25.7 22.0 - 29.0 mmol/L    Calcium 9.1 8.6 - 10.5 mg/dL    Total Protein 6.3 6.0 - 8.5 g/dL    Albumin 3.90 3.50 - 5.20 g/dL    ALT (SGPT) 37 1 - 41 U/L    AST (SGOT) 22 1 - 40 U/L    Alkaline Phosphatase 113 39 - 117 U/L    Total Bilirubin 0.4 0.0 - 1.2 mg/dL    eGFR Non African Amer 123 >60 mL/min/1.73    Globulin 2.4 gm/dL    A/G Ratio 1.6 g/dL    BUN/Creatinine Ratio 20.0 7.0 - 25.0    Anion Gap 11.3 5.0 - 15.0 mmol/L   Troponin    Collection Time: 09/08/21  3:32 PM    Specimen: Blood   Result Value Ref Range    Troponin T  0.073 (C) 0.000 - 0.030 ng/mL   Green Top (Gel)    Collection Time: 09/08/21  3:32 PM   Result Value Ref Range    Extra Tube Hold for add-ons.    Lavender Top    Collection Time: 09/08/21  3:32 PM   Result Value Ref Range    Extra Tube hold for add-on    Gold Top - SST    Collection Time: 09/08/21  3:32 PM   Result Value Ref Range    Extra Tube Hold for add-ons.    Light Blue Top    Collection Time: 09/08/21  3:32 PM   Result Value Ref Range    Extra Tube hold for add-on    CBC Auto Differential    Collection Time: 09/08/21  3:32 PM    Specimen: Blood   Result Value Ref Range    WBC 8.68 3.40 - 10.80 10*3/mm3    RBC 5.78 4.14 - 5.80 10*6/mm3    Hemoglobin 17.1 13.0 - 17.7 g/dL    Hematocrit 51.7 (H) 37.5 - 51.0 %    MCV 89.4 79.0 - 97.0 fL    MCH 29.6 26.6 - 33.0 pg    MCHC 33.1 31.5 - 35.7 g/dL    RDW 13.3 12.3 - 15.4 %    RDW-SD 43.6 37.0 - 54.0 fl    MPV 11.4 6.0 - 12.0 fL    Platelets 201 140 - 450 10*3/mm3    Neutrophil % 68.3 42.7 - 76.0 %    Lymphocyte % 18.7 (L) 19.6 - 45.3 %    Monocyte % 10.4 5.0 - 12.0 %    Eosinophil % 1.5 0.3 - 6.2 %    Basophil % 0.8 0.0 - 1.5 %    Immature Grans % 0.3 0.0 - 0.5 %    Neutrophils, Absolute 5.93 1.70 - 7.00 10*3/mm3    Lymphocytes, Absolute 1.62 0.70 - 3.10 10*3/mm3    Monocytes, Absolute 0.90 0.10 - 0.90 10*3/mm3    Eosinophils, Absolute 0.13 0.00 - 0.40 10*3/mm3    Basophils, Absolute 0.07 0.00 - 0.20 10*3/mm3    Immature Grans, Absolute 0.03 0.00 - 0.05 10*3/mm3    nRBC 0.0 0.0 - 0.2 /100 WBC   Protime-INR    Collection Time: 09/08/21  3:32 PM    Specimen: Blood   Result Value Ref Range    Protime 13.0 11.7 - 14.2 Seconds    INR 1.00 0.90 - 1.10   aPTT    Collection Time: 09/08/21  3:32 PM    Specimen: Blood   Result Value Ref Range    PTT 27.8 22.7 - 35.4 seconds   Troponin    Collection Time: 09/08/21  5:03 PM    Specimen: Blood   Result Value Ref Range    Troponin T 0.069 (C) 0.000 - 0.030 ng/mL       Ordered the above labs and independently reviewed the  results.        RADIOLOGY  XR Chest 2 View    Result Date: 9/8/2021  XR CHEST 2 VW-  HISTORY: Male who is 65 years-old,  chest pain  TECHNIQUE: Frontal and lateral views of the chest  COMPARISON: 04/13/2018  FINDINGS: The heart size appears borderline. Pulmonary vasculature is unremarkable. Mild right and mild to moderate left pleural effusions and bilateral basilar atelectasis or infiltrate are seen. No pneumothorax. No acute osseous process.      Mild right and mild to moderate left pleural effusions and bilateral basilar atelectasis or infiltrate  This report was finalized on 9/8/2021 4:32 PM by Dr. Jose Mcdonald M.D.        I ordered the above noted radiological studies. Reviewed by me and discussed with radiologist.  See dictation for official radiology interpretation.      PROCEDURES    Procedures      MEDICATIONS GIVEN IN ER    Medications   sodium chloride 0.9 % flush 10 mL (has no administration in time range)   aspirin tablet 325 mg (325 mg Oral Not Given 9/8/21 1651)   heparin 99015 units/250 mL (100 units/mL) in 0.45 % NaCl infusion (9.8 Units/kg/hr × 102 kg Intravenous New Bag 9/8/21 1730)   heparin (porcine) 5000 UNIT/ML injection 3,100-4,000 Units (has no administration in time range)   nitroglycerin (NITROSTAT) ointment 1 inch (1 inch Topical Given 9/8/21 1722)   heparin (porcine) 5000 UNIT/ML injection 4,000 Units (4,000 Units Intravenous Given 9/8/21 1727)   metoprolol tartrate (LOPRESSOR) tablet 25 mg (25 mg Oral Given 9/8/21 1817)         PROGRESS, DATA ANALYSIS, CONSULTS, AND MEDICAL DECISION MAKING    Any/all labs have been independently reviewed by me.  Any/all radiology studies have been reviewed by me and discussed with radiologist dictating the report.   EKG's independently viewed and interpreted by me.  Discussion below represents my analysis of pertinent findings related to patient's condition, differential diagnosis, treatment plan and final disposition.      ED Course as of Sep 08  1834   Wed Sep 08, 2021   1645 Differential diagnosis includes but is not limited to: Pulmonary embolism, aortic dissection, acute coronary syndrome/STEMI, pneumonia/CHF/COPD exacerbation, pneumothorax, pleurisy, bronchitis, gastroesophageal reflux, referred pain, traumatic injury/rib fractures, etc.    [WC]   1645 Cardiac monitor revealed normal sinus rhythm as interpreted by me.  Cardiac monitoring was ordered secondary to the patient's history of chest pain and to monitor the patient for dysrhythmia.    [WC]   1645 EKG performed at 1514 and interpreted by me shows normal sinus rhythm with a heart rate of 100 bpm.  The intervals are normal.  There is a nonspecific interventricular conduction delay and likely anterior infarct.  The rate is higher and the intraventricular conduction delay is new when compared to 1/11/2019.    [WC]   1647 Prior record review-his last visit with cardiology here was in January 2019.  His most recent heart catheterization in 2018 showed severe three-vessel coronary artery disease with an occluded RCA, high-grade mid left circumflex, diseased LAD with a patent LAD stent.    [WC]   1659 Symptoms are very suspicious for unstable angina/acute coronary syndrome.  Nitroglycerin ointment and heparin drip will be started.  Will discuss with cardiology for admission.    [WC]   1801 Case discussed with Dr. Snider (cardiology)-she agrees to admit the patient to a telemetry bed.    [WC]      ED Course User Index  [WC] Rajan Castro MD       AS OF 18:34 EDT VITALS:    BP - 179/100  HR - 84  TEMP - 97.6 °F (36.4 °C)  02 SATS - 93%        DIAGNOSIS  Final diagnoses:   Chest pain with high risk of acute coronary syndrome         DISPOSITION  Admitted-telemetry           Rajan Castro MD  09/08/21 1834

## 2021-09-08 NOTE — ED NOTES
Patient from home via EMS; call was for mid sternal chest pain. Pain worse on inspiration. Patient was given 324 ASA and two nitroglycerin. Pain was relieved from a 6/10 to a 0/10.      Laine Benito RN  09/08/21 1892

## 2021-09-09 ENCOUNTER — APPOINTMENT (OUTPATIENT)
Dept: CARDIOLOGY | Facility: HOSPITAL | Age: 66
End: 2021-09-09

## 2021-09-09 LAB
APTT PPP: 32.3 SECONDS (ref 22.7–35.4)
APTT PPP: 40.6 SECONDS (ref 22.7–35.4)
BASOPHILS # BLD AUTO: 0.07 10*3/MM3 (ref 0–0.2)
BASOPHILS NFR BLD AUTO: 0.8 % (ref 0–1.5)
DEPRECATED RDW RBC AUTO: 44.3 FL (ref 37–54)
EOSINOPHIL # BLD AUTO: 0.2 10*3/MM3 (ref 0–0.4)
EOSINOPHIL NFR BLD AUTO: 2.2 % (ref 0.3–6.2)
ERYTHROCYTE [DISTWIDTH] IN BLOOD BY AUTOMATED COUNT: 13.1 % (ref 12.3–15.4)
GLUCOSE BLDC GLUCOMTR-MCNC: 214 MG/DL (ref 70–130)
GLUCOSE BLDC GLUCOMTR-MCNC: 232 MG/DL (ref 70–130)
GLUCOSE BLDC GLUCOMTR-MCNC: 248 MG/DL (ref 70–130)
GLUCOSE BLDC GLUCOMTR-MCNC: 332 MG/DL (ref 70–130)
HCT VFR BLD AUTO: 47.5 % (ref 37.5–51)
HGB BLD-MCNC: 15.5 G/DL (ref 13–17.7)
IMM GRANULOCYTES # BLD AUTO: 0.04 10*3/MM3 (ref 0–0.05)
IMM GRANULOCYTES NFR BLD AUTO: 0.4 % (ref 0–0.5)
LYMPHOCYTES # BLD AUTO: 2.49 10*3/MM3 (ref 0.7–3.1)
LYMPHOCYTES NFR BLD AUTO: 26.8 % (ref 19.6–45.3)
MCH RBC QN AUTO: 29.8 PG (ref 26.6–33)
MCHC RBC AUTO-ENTMCNC: 32.6 G/DL (ref 31.5–35.7)
MCV RBC AUTO: 91.3 FL (ref 79–97)
MONOCYTES # BLD AUTO: 1.06 10*3/MM3 (ref 0.1–0.9)
MONOCYTES NFR BLD AUTO: 11.4 % (ref 5–12)
NEUTROPHILS NFR BLD AUTO: 5.44 10*3/MM3 (ref 1.7–7)
NEUTROPHILS NFR BLD AUTO: 58.4 % (ref 42.7–76)
NRBC BLD AUTO-RTO: 0 /100 WBC (ref 0–0.2)
PLATELET # BLD AUTO: 175 10*3/MM3 (ref 140–450)
PMV BLD AUTO: 11.6 FL (ref 6–12)
QT INTERVAL: 394 MS
RBC # BLD AUTO: 5.2 10*6/MM3 (ref 4.14–5.8)
TROPONIN T SERPL-MCNC: 0.12 NG/ML (ref 0–0.03)
WBC # BLD AUTO: 9.3 10*3/MM3 (ref 3.4–10.8)

## 2021-09-09 PROCEDURE — 99153 MOD SED SAME PHYS/QHP EA: CPT | Performed by: INTERNAL MEDICINE

## 2021-09-09 PROCEDURE — 25010000002 MIDAZOLAM PER 1 MG: Performed by: INTERNAL MEDICINE

## 2021-09-09 PROCEDURE — 93458 L HRT ARTERY/VENTRICLE ANGIO: CPT | Performed by: INTERNAL MEDICINE

## 2021-09-09 PROCEDURE — 99152 MOD SED SAME PHYS/QHP 5/>YRS: CPT | Performed by: INTERNAL MEDICINE

## 2021-09-09 PROCEDURE — C1769 GUIDE WIRE: HCPCS | Performed by: INTERNAL MEDICINE

## 2021-09-09 PROCEDURE — 4A023N7 MEASUREMENT OF CARDIAC SAMPLING AND PRESSURE, LEFT HEART, PERCUTANEOUS APPROACH: ICD-10-PCS | Performed by: INTERNAL MEDICINE

## 2021-09-09 PROCEDURE — 82962 GLUCOSE BLOOD TEST: CPT

## 2021-09-09 PROCEDURE — 85730 THROMBOPLASTIN TIME PARTIAL: CPT | Performed by: INTERNAL MEDICINE

## 2021-09-09 PROCEDURE — 0 IOPAMIDOL PER 1 ML: Performed by: INTERNAL MEDICINE

## 2021-09-09 PROCEDURE — 25010000002 HEPARIN (PORCINE) PER 1000 UNITS: Performed by: INTERNAL MEDICINE

## 2021-09-09 PROCEDURE — 25010000002 HEPARIN (PORCINE) PER 1000 UNITS: Performed by: EMERGENCY MEDICINE

## 2021-09-09 PROCEDURE — B2151ZZ FLUOROSCOPY OF LEFT HEART USING LOW OSMOLAR CONTRAST: ICD-10-PCS | Performed by: INTERNAL MEDICINE

## 2021-09-09 PROCEDURE — 63710000001 INSULIN LISPRO (HUMAN) PER 5 UNITS: Performed by: HOSPITALIST

## 2021-09-09 PROCEDURE — C1894 INTRO/SHEATH, NON-LASER: HCPCS | Performed by: INTERNAL MEDICINE

## 2021-09-09 PROCEDURE — 84484 ASSAY OF TROPONIN QUANT: CPT | Performed by: INTERNAL MEDICINE

## 2021-09-09 PROCEDURE — 93010 ELECTROCARDIOGRAM REPORT: CPT | Performed by: INTERNAL MEDICINE

## 2021-09-09 PROCEDURE — 93005 ELECTROCARDIOGRAM TRACING: CPT | Performed by: INTERNAL MEDICINE

## 2021-09-09 PROCEDURE — 99233 SBSQ HOSP IP/OBS HIGH 50: CPT | Performed by: INTERNAL MEDICINE

## 2021-09-09 PROCEDURE — B2111ZZ FLUOROSCOPY OF MULTIPLE CORONARY ARTERIES USING LOW OSMOLAR CONTRAST: ICD-10-PCS | Performed by: INTERNAL MEDICINE

## 2021-09-09 PROCEDURE — C1887 CATHETER, GUIDING: HCPCS | Performed by: INTERNAL MEDICINE

## 2021-09-09 PROCEDURE — C9600 PERC DRUG-EL COR STENT SING: HCPCS | Performed by: INTERNAL MEDICINE

## 2021-09-09 PROCEDURE — C1874 STENT, COATED/COV W/DEL SYS: HCPCS | Performed by: INTERNAL MEDICINE

## 2021-09-09 PROCEDURE — 85347 COAGULATION TIME ACTIVATED: CPT

## 2021-09-09 PROCEDURE — 25010000002 FENTANYL CITRATE (PF) 50 MCG/ML SOLUTION: Performed by: INTERNAL MEDICINE

## 2021-09-09 PROCEDURE — 027135Z DILATION OF CORONARY ARTERY, TWO ARTERIES WITH TWO DRUG-ELUTING INTRALUMINAL DEVICES, PERCUTANEOUS APPROACH: ICD-10-PCS | Performed by: INTERNAL MEDICINE

## 2021-09-09 PROCEDURE — 25010000002 FUROSEMIDE PER 20 MG: Performed by: INTERNAL MEDICINE

## 2021-09-09 PROCEDURE — C1725 CATH, TRANSLUMIN NON-LASER: HCPCS | Performed by: INTERNAL MEDICINE

## 2021-09-09 PROCEDURE — 85025 COMPLETE CBC W/AUTO DIFF WBC: CPT | Performed by: EMERGENCY MEDICINE

## 2021-09-09 PROCEDURE — 92928 PRQ TCAT PLMT NTRAC ST 1 LES: CPT | Performed by: INTERNAL MEDICINE

## 2021-09-09 DEVICE — XIENCE SIERRA™ EVEROLIMUS ELUTING CORONARY STENT SYSTEM 3.25 MM X 28 MM / RAPID-EXCHANGE
Type: IMPLANTABLE DEVICE | Status: FUNCTIONAL
Brand: XIENCE SIERRA™

## 2021-09-09 RX ORDER — CLOPIDOGREL BISULFATE 75 MG/1
TABLET ORAL AS NEEDED
Status: DISCONTINUED | OUTPATIENT
Start: 2021-09-09 | End: 2021-09-09 | Stop reason: HOSPADM

## 2021-09-09 RX ORDER — FUROSEMIDE 10 MG/ML
INJECTION INTRAMUSCULAR; INTRAVENOUS AS NEEDED
Status: DISCONTINUED | OUTPATIENT
Start: 2021-09-09 | End: 2021-09-09 | Stop reason: HOSPADM

## 2021-09-09 RX ORDER — ATORVASTATIN CALCIUM 20 MG/1
40 TABLET, FILM COATED ORAL NIGHTLY
Status: DISCONTINUED | OUTPATIENT
Start: 2021-09-09 | End: 2021-09-14

## 2021-09-09 RX ORDER — DEXTROSE MONOHYDRATE 25 G/50ML
25 INJECTION, SOLUTION INTRAVENOUS
Status: DISCONTINUED | OUTPATIENT
Start: 2021-09-09 | End: 2021-09-09

## 2021-09-09 RX ORDER — CLOPIDOGREL BISULFATE 75 MG/1
600 TABLET ORAL ONCE
Status: DISCONTINUED | OUTPATIENT
Start: 2021-09-09 | End: 2021-09-09 | Stop reason: SDUPTHER

## 2021-09-09 RX ORDER — FUROSEMIDE 10 MG/ML
40 INJECTION INTRAMUSCULAR; INTRAVENOUS ONCE
Status: DISCONTINUED | OUTPATIENT
Start: 2021-09-09 | End: 2021-09-10

## 2021-09-09 RX ORDER — INSULIN LISPRO 100 [IU]/ML
0-7 INJECTION, SOLUTION INTRAVENOUS; SUBCUTANEOUS
Status: DISCONTINUED | OUTPATIENT
Start: 2021-09-09 | End: 2021-09-15 | Stop reason: HOSPADM

## 2021-09-09 RX ORDER — LIDOCAINE HYDROCHLORIDE 20 MG/ML
INJECTION, SOLUTION INFILTRATION; PERINEURAL AS NEEDED
Status: DISCONTINUED | OUTPATIENT
Start: 2021-09-09 | End: 2021-09-09 | Stop reason: HOSPADM

## 2021-09-09 RX ORDER — CLOPIDOGREL BISULFATE 75 MG/1
75 TABLET ORAL DAILY
Status: DISCONTINUED | OUTPATIENT
Start: 2021-09-10 | End: 2021-09-15 | Stop reason: HOSPADM

## 2021-09-09 RX ORDER — ACETAMINOPHEN 325 MG/1
650 TABLET ORAL EVERY 4 HOURS PRN
Status: DISCONTINUED | OUTPATIENT
Start: 2021-09-09 | End: 2021-09-15 | Stop reason: HOSPADM

## 2021-09-09 RX ORDER — NICOTINE POLACRILEX 4 MG
15 LOZENGE BUCCAL
Status: DISCONTINUED | OUTPATIENT
Start: 2021-09-09 | End: 2021-09-09

## 2021-09-09 RX ORDER — PANTOPRAZOLE SODIUM 40 MG/1
40 TABLET, DELAYED RELEASE ORAL
Status: DISCONTINUED | OUTPATIENT
Start: 2021-09-10 | End: 2021-09-15 | Stop reason: HOSPADM

## 2021-09-09 RX ORDER — ASPIRIN 81 MG/1
81 TABLET ORAL DAILY
Status: DISCONTINUED | OUTPATIENT
Start: 2021-09-10 | End: 2021-09-15 | Stop reason: HOSPADM

## 2021-09-09 RX ORDER — HEPARIN SODIUM 1000 [USP'U]/ML
INJECTION, SOLUTION INTRAVENOUS; SUBCUTANEOUS AS NEEDED
Status: DISCONTINUED | OUTPATIENT
Start: 2021-09-09 | End: 2021-09-09 | Stop reason: HOSPADM

## 2021-09-09 RX ORDER — LISINOPRIL 20 MG/1
20 TABLET ORAL ONCE
Status: COMPLETED | OUTPATIENT
Start: 2021-09-09 | End: 2021-09-09

## 2021-09-09 RX ORDER — FENTANYL CITRATE 50 UG/ML
INJECTION, SOLUTION INTRAMUSCULAR; INTRAVENOUS AS NEEDED
Status: DISCONTINUED | OUTPATIENT
Start: 2021-09-09 | End: 2021-09-09 | Stop reason: HOSPADM

## 2021-09-09 RX ORDER — FUROSEMIDE 10 MG/ML
40 INJECTION INTRAMUSCULAR; INTRAVENOUS EVERY 12 HOURS
Status: DISCONTINUED | OUTPATIENT
Start: 2021-09-09 | End: 2021-09-11

## 2021-09-09 RX ORDER — SODIUM CHLORIDE 9 MG/ML
INJECTION, SOLUTION INTRAVENOUS CONTINUOUS PRN
Status: COMPLETED | OUTPATIENT
Start: 2021-09-09 | End: 2021-09-09

## 2021-09-09 RX ORDER — PETROLATUM 420 MG/G
1 OINTMENT TOPICAL
Status: DISCONTINUED | OUTPATIENT
Start: 2021-09-09 | End: 2021-09-15 | Stop reason: HOSPADM

## 2021-09-09 RX ORDER — ASPIRIN 325 MG
TABLET ORAL AS NEEDED
Status: DISCONTINUED | OUTPATIENT
Start: 2021-09-09 | End: 2021-09-09 | Stop reason: HOSPADM

## 2021-09-09 RX ORDER — MIDAZOLAM HYDROCHLORIDE 1 MG/ML
INJECTION INTRAMUSCULAR; INTRAVENOUS AS NEEDED
Status: DISCONTINUED | OUTPATIENT
Start: 2021-09-09 | End: 2021-09-09 | Stop reason: HOSPADM

## 2021-09-09 RX ORDER — LISINOPRIL 20 MG/1
20 TABLET ORAL
Status: DISCONTINUED | OUTPATIENT
Start: 2021-09-09 | End: 2021-09-10

## 2021-09-09 RX ADMIN — METOPROLOL TARTRATE 25 MG: 25 TABLET ORAL at 07:56

## 2021-09-09 RX ADMIN — PETROLATUM 1 APPLICATION: 420 OINTMENT TOPICAL at 20:45

## 2021-09-09 RX ADMIN — ATORVASTATIN CALCIUM 40 MG: 20 TABLET, FILM COATED ORAL at 20:45

## 2021-09-09 RX ADMIN — HEPARIN SODIUM 4000 UNITS: 5000 INJECTION INTRAVENOUS; SUBCUTANEOUS at 00:43

## 2021-09-09 RX ADMIN — INSULIN LISPRO 5 UNITS: 100 INJECTION, SOLUTION INTRAVENOUS; SUBCUTANEOUS at 17:47

## 2021-09-09 RX ADMIN — METOPROLOL TARTRATE 25 MG: 25 TABLET ORAL at 20:45

## 2021-09-09 RX ADMIN — LISINOPRIL 20 MG: 20 TABLET ORAL at 07:56

## 2021-09-09 RX ADMIN — LISINOPRIL 20 MG: 20 TABLET ORAL at 04:34

## 2021-09-09 NOTE — NURSING NOTE
CWON consult received. Patient back to room after heart cath.  BLE with 2+ edema. Skin with an erythematous rash consistent with possibly eczema or psoriasis?? Skin is extremely dry and flaky and no open wounds or weepy tissue is present.  He denies complaints other than his skin sometimes is itchy. Further assessment shows patches of similar redness to abdomen and BUE. This appears to be a chronic condition but he denies ever being treated for it.  CWON will recommend Aquaphor to aid in moisturizing followed by ace compression wraps to BLE to help manage his edema. Will ask staff RN to apply once Aquaphor is delivered from Pharmacy.    Thank you for consult and please call with any questions.

## 2021-09-09 NOTE — NURSING NOTE
CWON consult received. Attempted to see patient. He is off the unit having a heart cath. Will attempt at a later time.

## 2021-09-09 NOTE — CONSULTS
Internal medicine consult    Primary care physician  Dr. Jacobson    Referring physician  Dr. Snider    Chief complaint  Chest pain    Reason for consult  Follow medical problem    History of present illness  65-year-old white male with history of diabetes hypertension hyperlipidemia and known coronary artery disease admitted to emergency room with intermittent chest pain for last several weeks with shortness of breath and nausea but no vomiting.  Patient also stated he gets nonradiating pain throbbing type but sometimes get diaphoretic.  I am asked to follow the patient for medical problem.  Patient is chest pain-free at the time of interview.  Patient also denies any fever cough congestion night sweats weight loss or weight gain.  Patient underwent cardiac cath this morning secondary to non-ST elevation MI and had angioplasty and stent placed.  And asked to follow the patient for medical problems specially diabetes.    PAST MEDICAL HISTORY  • Coronary artery disease     • Hyperlipidemia     • Hypertension     • Gastroesophageal reflux disease  Diabetes mellitus        PAST SURGICAL HISTORY              Procedure Laterality Date   • CARDIAC CATHETERIZATION Left 2018     Procedure: Cardiac Catheterization/Vascular Study;  Surgeon: Zane De La O MD;  Location:  [facility] CATH INVASIVE LOCATION;  Service: Cardiovascular   • CARDIAC CATHETERIZATION N/A 2018     Procedure: Stent KD coronary;  Surgeon: Zane De La O MD;  Location:  [facility] CATH INVASIVE LOCATION;  Service: Cardiovascular   • CORONARY ANGIOPLASTY WITH STENT PLACEMENT       • KIDNEY STONE SURGERY             FAMILY HISTORY           Problem Relation Age of Onset   • Coronary artery disease Father 60   • Heart attack Brother 48               SOCIAL HISTORY                 Socioeconomic History   • Marital status:        Spouse name: Not on file   • Number of children: Not on file   • Years of education: Not on file   • Highest  "education level: Not on file   Tobacco Use   • Smoking status: Former Smoker   • Smokeless tobacco: Never Used   • Tobacco comment: daily caffiene   Substance and Sexual Activity   • Alcohol use: No   • Drug use: No   • Sexual activity: Defer         ALLERGIES  Patient has no known allergies.  Home medications reviewed     REVIEW OF SYSTEMS  Constitutional: Positive for fatigue. Negative for chills and fever.   Respiratory: Positive for shortness of breath.    Cardiovascular: Positive for chest pain.   Gastrointestinal: Positive for nausea. Negative for abdominal pain, constipation and diarrhea.     PHYSICAL EXAM   Blood pressure 144/95, pulse 94, temperature 98 °F (36.7 °C), temperature source Oral, resp. rate 20, height 170.2 cm (67\"), weight 98.6 kg (217 lb 6.4 oz), SpO2 92 %.    Constitutional: Pt. is oriented to person, place, and time and well-developed, well-nourished, and in no distress.  He is disheveled.  HENT: Normocephalic and atraumatic. Oropharynx moist/nonerythematous.  Neck: Normal range of motion. Neck supple. No JVD present.   Cardiovascular: Normal rate, regular rhythm and normal heart sounds. Exam reveals no gallop and no friction rub.   No murmur heard.  Pulmonary/Chest: Effort normal and breath sounds normal. No stridor. No respiratory distress. No wheezes, no rales.   Abdominal: Soft. Bowel sounds are normal. No distension. There is no tenderness. There is no rebound and no guarding.   Musculoskeletal: Normal range of motion. No edema, tenderness or deformity.   Neurological: Pt. is alert and oriented to person, place, and time.  He has no focal neurologic deficits  Skin: Skin is warm and dry.  He has an eczematous, flaking rash over his lower extremities that he states has been there for years.  Pt. is not diaphoretic. No erythema.   Psychiatric: Mood, affect and judgment normal.  He is pleasant and cooperative.    LAB RESULTS  Lab Results (last 24 hours)     Procedure Component Value Units " Date/Time    POC Glucose Once [[id number]]  (Abnormal) Collected: 09/09/21 1533    Specimen: Blood Updated: 09/09/21 1533     Glucose 332 mg/dL      Comment: Meter: [id number] : [id number] Cammie LADD       POC Glucose Once [[id number]]  (Abnormal) Collected: 09/09/21 1148    Specimen: Blood Updated: 09/09/21 1153     Glucose 232 mg/dL      Comment: Meter: [id number] : [id number] Je Bibiana LOPEZ       Troponin [[id number]]  (Abnormal) Collected: 09/09/21 0641    Specimen: Blood Updated: 09/09/21 0807     Troponin T 0.118 ng/mL     Narrative:      Troponin T Reference Range:  <= 0.03 ng/mL-   Negative for AMI  >0.03 ng/mL-     Abnormal for myocardial necrosis.  Clinicians would have to utilize clinical acumen, EKG, Troponin and serial changes to determine if it is an Acute Myocardial Infarction or myocardial injury due to an underlying chronic condition.       Results may be falsely decreased if patient taking Biotin.      aPTT [[id number]]  (Abnormal) Collected: 09/09/21 0641    Specimen: Blood from Arm, Right Updated: 09/09/21 0726     PTT 40.6 seconds     CBC & Differential [[id number]]  (Abnormal) Collected: 09/09/21 0641    Specimen: Blood Updated: 09/09/21 0715    Narrative:      The following orders were created for panel order CBC & Differential.  Procedure                               Abnormality         Status                     ---------                               -----------         ------                     CBC Auto Differential[[id number]]        Abnormal            Final result                 Please view results for these tests on the individual orders.    CBC Auto Differential [[id number]]  (Abnormal) Collected: 09/09/21 0641    Specimen: Blood Updated: 09/09/21 0715     WBC 9.30 10*3/mm3      RBC 5.20 10*6/mm3      Hemoglobin 15.5 g/dL      Hematocrit 47.5 %      MCV 91.3 fL      MCH 29.8 pg      MCHC 32.6 g/dL      RDW 13.1 %      RDW-SD 44.3 fl      MPV 11.6 fL      Platelets 175 10*3/mm3       Neutrophil % 58.4 %      Lymphocyte % 26.8 %      Monocyte % 11.4 %      Eosinophil % 2.2 %      Basophil % 0.8 %      Immature Grans % 0.4 %      Neutrophils, Absolute 5.44 10*3/mm3      Lymphocytes, Absolute 2.49 10*3/mm3      Monocytes, Absolute 1.06 10*3/mm3      Eosinophils, Absolute 0.20 10*3/mm3      Basophils, Absolute 0.07 10*3/mm3      Immature Grans, Absolute 0.04 10*3/mm3      nRBC 0.0 /100 WBC     POC Glucose Once [[id number]]  (Abnormal) Collected: 09/09/21 0553    Specimen: Blood Updated: 09/09/21 0554     Glucose 214 mg/dL      Comment: Meter: [id number] : [id number] Brandonmelvin Mina ELKIN       aPTT [[id number]]  (Normal) Collected: 09/08/21 2328    Specimen: Blood from Arm, Right Updated: 09/09/21 0023     PTT 32.3 seconds     POC Glucose Once [[id number]]  (Abnormal) Collected: 09/08/21 2122    Specimen: Blood Updated: 09/08/21 2125     Glucose 191 mg/dL      Comment: Meter: [id number] : [id number] Tanvi LOPEZ              Study Result    Narrative & Impression   XR CHEST 2 VW-     HISTORY: Male who is 65 years-old,  chest pain     TECHNIQUE: Frontal and lateral views of the chest     COMPARISON: 04/13/2018     FINDINGS: The heart size appears borderline. Pulmonary vasculature is  unremarkable. Mild right and mild to moderate left pleural effusions and  bilateral basilar atelectasis or infiltrate are seen. No pneumothorax.  No acute osseous process.     IMPRESSION:  Mild right and mild to moderate left pleural effusions and  bilateral basilar atelectasis or infiltrate            ECG 12 Lead  Component   Ref Range & Units 9/9/21 0241 9/8/21 2031   QT Interval   ms 394 P  393         HEART RATE= 82  bpm  RR Interval= 732  ms  VA Interval= 203  ms  P Horizontal Axis= -32  deg  P Front Axis= 53  deg  QRSD Interval= 97  ms  QT Interval= 394  ms  QRS Axis= -19  deg  T Wave Axis= 158  deg  - ABNORMAL ECG -  Sinus rhythm  Probable left atrial enlargement  Borderline left axis  deviation  Consider anterior infarct  Abnormal T, consider ischemia, lateral leads               Current Facility-Administered Medications:   •  acetaminophen (TYLENOL) tablet 650 mg, 650 mg, Oral, Q4H PRN, Zane De La O MD  •  [START ON 9/10/2021] aspirin EC tablet 81 mg, 81 mg, Oral, Daily, Zane De La O MD  •  atorvastatin (LIPITOR) tablet 40 mg, 40 mg, Oral, Nightly, Zane De La O MD  •  [DISCONTINUED] clopidogrel (PLAVIX) tablet 600 mg, 600 mg, Oral, Once **AND** [START ON 9/10/2021] clopidogrel (PLAVIX) tablet 75 mg, 75 mg, Oral, Daily, Zane De La O MD  •  furosemide (LASIX) injection 40 mg, 40 mg, Intravenous, Once, Zane De La O MD  •  furosemide (LASIX) injection 40 mg, 40 mg, Intravenous, Q12H, Zane De La O MD  •  heparin (porcine) 5000 UNIT/ML injection 3,100-4,000 Units, 30-39.2 Units/kg, Intravenous, Q6H PRN, Zane De La O MD, 4,000 Units at 09/09/21 0043  •  lisinopril (PRINIVIL,ZESTRIL) tablet 20 mg, 20 mg, Oral, Q24H, Zane De La O MD, 20 mg at 09/09/21 0756  •  metoprolol tartrate (LOPRESSOR) tablet 25 mg, 25 mg, Oral, Q12H, Zane De La O MD, 25 mg at 09/09/21 0756  •  Petrolatum ointment 1 application, 1 application, Topical, Q24H, Delmi Liriano MD  •  sodium chloride 0.9 % flush 10 mL, 10 mL, Intravenous, PRN, Zane De La O MD     ASSESSMENT  Acute non-ST elevation MI status post angioplasty with stent placement  Coronary artery disease  Acute systolic congestive heart failure  Diabetes mellitus  Hypertension  Hyperlipidemia  Gastroesophageal reflux disease    PLAN  Agree with current care  Post PCI care  Aspirin Plavix Lopressor Zestril Lipitor  Stress ulcer prophylaxis  Accu-Chek with sliding scale insulin  Check hemoglobin A1c TSH lipid profile  Repeat labs in a.m.  Supportive care  Patient is full code  Discussed with nursing staff  We will follow with  and further recommendation current hospital course    DELMI  VANGIE ROA

## 2021-09-09 NOTE — CASE MANAGEMENT/SOCIAL WORK
Discharge Planning Assessment  [facility]     Patient Name: Matt Cruz  MRN: [id number]  Today's Date: 9/9/2021    Admit Date: 9/8/2021    Discharge Needs Assessment     Row Name 09/09/21 1243       Living Environment    Lives With  spouse    Name(s) of Who Lives With Patient  wife, Riley Delatorre [phone]    Current Living Arrangements  home/apartment/condo    Primary Care Provided by  self    Provides Primary Care For  no one    Family Caregiver if Needed  none    Quality of Family Relationships  helpful;involved;supportive    Able to Return to Prior Arrangements  yes       Resource/Environmental Concerns    Resource/Environmental Concerns  none    Transportation Concerns  car, none       Transition Planning    Patient/Family Anticipates Transition to  home with family    Patient/Family Anticipated Services at Transition  none    Transportation Anticipated  family or friend will provide       Discharge Needs Assessment    Readmission Within the Last 30 Days  no previous admission in last 30 days    Equipment Currently Used at Home  none    Concerns to be Addressed  no discharge needs identified;denies needs/concerns at this time    Anticipated Changes Related to Illness  none    Equipment Needed After Discharge  none        Discharge Plan     Row Name 09/09/21 3441       Plan    Plan  plans to return home with wife- CCP will follow for needs    Patient/Family in Agreement with Plan  yes    Plan Comments  Spoke with patient at bedside.  Introduced self and explained role.  Facesheet, PCP and pharmacy verified.  Patient lives in a single story house with several steps to enter.  He lives with his wife, Riley Cruz ( [phone]).  He is IADLS and drives.  He does not use any DME and does not have a HH or SNF history.  At [place], he plans to return home and denies any needs.  CCP will follow.   Patsy Harper RN        Continued Care and Services - Admitted Since 9/8/2021    Coordination has not been started  for this encounter.         Demographic Summary     Row Name 09/09/21 1242       General Information    Admission Type  inpatient    Arrived From  emergency department    Required Notices Provided  Important Message from Medicare    Referral Source  admission list    Reason for Consult  discharge planning    Preferred Language  English        Functional Status     Row Name 09/09/21 1242       Functional Status    Usual Activity Tolerance  moderate    Current Activity Tolerance  moderate       Functional Status, IADL    Medications  independent    Meal Preparation  independent    Housekeeping  independent    Laundry  independent    Shopping  independent       Mental Status    General Appearance WDL  WDL       Mental Status Summary    Recent Changes in Mental Status/Cognitive Functioning  no changes        Psychosocial    No documentation.       Abuse/Neglect    No documentation.       Legal    No documentation.       Substance Abuse    No documentation.       Patient Forms    No documentation.           Patsy Harper RN

## 2021-09-09 NOTE — H&P
Patient Name: Matt Cruz  :1955  65 y.o.    Date of Admission: 2021  Date of Consultation:  21  Encounter Provider: Liliana Powell RN  Place of Service: [facility] CARDIOLOGY  Referring Provider: Nikki Snider MD  Patient Care Team:  Yelena Jacobson APRN as PCP - General (Nurse Practitioner)      Chief complaint: Chest pain    History of Present Illness:    Mr Cruz is a 65 year old patient with a history of hypertension, diabetes, TIA,  hyperlipidemia, carotid stenosis, and coronary artery disease (s/p stenting  to LAD in 2010 and LCx KD in 2018).     In 2018 he was admitted for stroke like symptoms and he underwent repeat cardiac catheterization for chest pain.  He was found to have occluded RCA, high grade mid left circumflex lesion, with patent LAD stent, with diffuse distal LAD disease.  KD was placed to the mid LCX.  He was sent home with ZIO patch but this did not show any arrhythmias. He had CTA of the head and neck which showed not hemorrhage or stroke but he did have a severe focal stenosis if the left P2 segment and 50% stenosis of the FELICIA.  He has been seen by Dr Alba but has not been to the office since 2019. He was continued on dual antiplatelet therapy.      He is resting comfortably in bed this morning. He has no current chest pain. Over the last month he has had worsening angina. It feels like heaviness and tightness in his chest. Similar to prior angina. It has been worsening in severity and frequency. It improved with some pain medication in the emergency room last night. He is also been increasingly dizzy with exertion. He has dyspnea at rest and with exertion. He has noticed significant lower extremity edema as well in the last month.    His troponins are rising and are 0.1 this morning. Creatinine is normal. Blood pressure is elevated as high as 180/100.    Previous Cardiac Testing  ECHO 2018  · Left ventricular wall  thickness is consistent with mild concentric hypertrophy.  · Left ventricular systolic function is normal. Estimated EF = 57%.     Cardiac Catheterization 4/16/2018  Hemodynamics:  1.  Aorta: 136/65, mean 78  2.  Left ventricle: 136/12     Cineangiography:  1.  Left main: The left main coronary artery is moderately calcified and diffusely narrowed 20-30%.  2.  LAD: The left anterior descending coronary artery is diffusely diseased throughout its entire course with multiple stenoses up to 50-60%.  There is a previously deployed stent in the mid segment that is patent but diffusely narrowed 50%.  The diagonal branches arising from the LAD are small.  3.  LCx: The proximal portion of the cardiac is narrowed 20%.  The mid segment is narrowed 80% just after the origin of the obtuse marginal branch.  The mid segment otherwise is normal.  The artery terminates as a posterior lateral branch.  There is a 50% stenosis present at the ostium of the branch.  There is a more proximal marginal branch that is normal as well.  4.  RCA: The right coronary artery is completely occluded proximally.  There is slow antegrade filling of what appears to be a diffusely diseased blood vessel.  Collateralization from the left system is noted.     Left ventriculography: Overall size of the ventricle is normal.  Mild inferior hypokinesia is noted.  The estimated ejection fraction is 50%.     Intervention:  1.  Mid LCx: The initial stenosis is 90%.  The lesion type is B2.  The initial HUGH flow is 1.  The post flow is 3.  Residual narrowing 0.     Assessment:  1.  Ischemic heart disease:  Etiology: Coronary atherosclerosis  Anatomy: Severe three-vessel coronary disease, occluded RCA, high-grade mid LCx, diffuse LAD, patent LAD stent  Physiology: Minimal left ventricular dysfunction, angina pectoris  Recommendation: Dual antiplatelet therapy indefinitely, aggressive risk factor modification.    Holter Monitor 4/17/2018  · A relatively benign  monitor study.     Sinus rhythm with rare atrial and ventricular ectopic beats.         Past Medical History:   Diagnosis Date   • CAD (coronary artery disease)    • Carotid stenosis    • Coronary artery disease    • Elevated cholesterol    • Essential hypertension    • Exertional chest pain    • Hyperlipidemia    • Hypertension    • Kidney stone    • TIA (transient ischemic attack)    • Type 2 diabetes mellitus with other specified complication (CMS/HCC)        Past Surgical History:   Procedure Laterality Date   • CARDIAC CATHETERIZATION Left 4/16/2018    Procedure: Cardiac Catheterization/Vascular Study;  Surgeon: Zane De La O MD;  Location:  [facility] C[facility]AS[facility];  Service: Cardiovascular   • CARDIAC CATHETERIZATION N/A 4/16/2018    Procedure: Stent KD coronary;  Surgeon: Zane De La O MD;  Location:  [facility]VAS[facility]ATION;  Service: Cardiovascular   • CORONARY ANGIOPLASTY WITH STENT PLACEMENT     • KIDNEY STONE SURGERY           Prior to Admission medications    Medication Sig Start Date End Date Taking? Authorizing Provider   aspirin 81 MG EC tablet Take 1 tablet by mouth Daily. 4/18/18  Yes Amaury Johnson MD   atorvastatin (LIPITOR) 80 MG tablet Take 1 tablet by mouth Every Night. 4/17/18   Amaury Johnson MD   carvedilol (COREG) 25 MG tablet Take 1 tablet by mouth Every 12 (Twelve) Hours. 4/17/18   Amaury Johnson MD   clopidogrel (PLAVIX) 75 MG tablet Take 1 tablet by mouth Daily. 4/18/18   Amaury Johnson MD   diphenhydrAMINE (BENADRYL) 25 mg capsule Take 25 mg by mouth At Night As Needed for Allergies.    Serena Harp MD   icosapent ethyl (VASCEPA) 1 g capsule capsule Take 2 g by mouth. 8/9/18   Serena Harp MD   insulin detemir (LEVEMIR) 100 UNIT/ML injection Inject 20 Units under the skin Every Night. 4/17/18   Amaury Johnson MD   Insulin Pen Needle (PEN NEEDLES) 31G X 6 MM misc Inject 1 each under the skin. 7/12/18   Serena Harp MD   valsartan  (DIOVAN) 320 MG tablet Take 1 tablet by mouth Daily. 18   Amaury Johnson MD   vitamin B-12 (VITAMIN B-12) 1000 MCG tablet Take 1 tablet by mouth Daily. 18   Amaury Johnson MD       No Known Allergies    Social History     Socioeconomic History   • Marital status:      Spouse name: Not on file   • Number of children: Not on file   • Years of education: Not on file   • Highest education level: Not on file   Tobacco Use   • Smoking status: Former Smoker   • Smokeless tobacco: Never Used   Vaping Use   • Vaping Use: Never used   Substance and Sexual Activity   • Alcohol use: No   • Drug use: No   • Sexual activity: Defer       Family History   Problem Relation Age of Onset   • Coronary artery disease Father 60   • Heart attack Brother 48               REVIEW OF SYSTEMS:   All systems reviewed.  Pertinent positives identified in HPI.  All other systems are negative.      Objective:     Vitals:    21 0500 21 0530 21 0600 21 0700   BP: (!) 178/113 166/99 166/95 (!) 178/106   BP Location:       Patient Position:       Pulse:       Resp:       Temp:       TempSrc:       SpO2:   96%    Weight:       Height:         Body mass index is 34.05 kg/m².    GEN: no distress, alert and oriented. Disheveled and unkempt  Eyes: normal sclerae, normal lids and lashes  HENT: moist mucous membranes,   Lungs: CTAB, no rales or wheezes  Chest: no abnormalities  Neck: no JVD or carotid bruits  CV: RRR, no murmurs, +2 DP and 2+ carotid pulses b/l  Abdomen: soft, nontender, nondistended  Extremities: +2 b/l edema, erythema  Skin: no rash, warm, dry  Heme/Lymph: no bruising  Psych: organized thought, normal behavior and affect      Results from last 7 days   Lab Units 21  1532   SODIUM mmol/L 140   POTASSIUM mmol/L 3.6   CHLORIDE mmol/L 103   CO2 mmol/L 25.7   BUN mg/dL 13   CREATININE mg/dL 0.65*   CALCIUM mg/dL 9.1   BILIRUBIN mg/dL 0.4   ALK PHOS U/L 113   ALT (SGPT) U/L 37   AST (SGOT) U/L  22   GLUCOSE mg/dL 231*     Results from last 7 days   Lab Units 09/08/21  1703 09/08/21  1532   TROPONIN T ng/mL 0.069* 0.073*     Results from last 7 days   Lab Units 09/09/21  0641   WBC 10*3/mm3 9.30   HEMOGLOBIN g/dL 15.5   HEMATOCRIT % 47.5   PLATELETS 10*3/mm3 175     Results from last 7 days   Lab Units 09/08/21  2328 09/08/21  1532   INR   --  1.00   APTT seconds 32.3 27.8                     Telemetry      EKG this AM      EKG on admission      EKG baseline      I personally viewed and interpreted the patient's EKG/Telemetry data.        Assessment and Plan:       1. Non-STEMI: Typical angina. Plan left heart catheterization today  2. Acute CHF: We will get an echocardiogram. Chest x-ray with bilateral effusions. Lasix forty IV x1.    Haleigh Powell MD  [facility]  09/09/21

## 2021-09-10 LAB
ACT BLD: 186 SECONDS (ref 82–152)
ACT BLD: 219 SECONDS (ref 82–152)
ACT BLD: 241 SECONDS (ref 82–152)
ALBUMIN SERPL-MCNC: 3.5 G/DL (ref 3.5–5.2)
ALBUMIN/GLOB SERPL: 1.3 G/DL
ALP SERPL-CCNC: 101 U/L (ref 39–117)
ALT SERPL W P-5'-P-CCNC: 25 U/L (ref 1–41)
ANION GAP SERPL CALCULATED.3IONS-SCNC: 12.8 MMOL/L (ref 5–15)
AORTIC DIMENSIONLESS INDEX: 0.6 (DI)
APTT PPP: 29.8 SECONDS (ref 22.7–35.4)
AST SERPL-CCNC: 15 U/L (ref 1–40)
BASOPHILS # BLD AUTO: 0.05 10*3/MM3 (ref 0–0.2)
BASOPHILS NFR BLD AUTO: 0.5 % (ref 0–1.5)
BH CV ECHO MEAS - ACS: 1.9 CM
BH CV ECHO MEAS - AO MAX PG (FULL): 4.6 MMHG
BH CV ECHO MEAS - AO MAX PG: 7.1 MMHG
BH CV ECHO MEAS - AO MEAN PG (FULL): 3 MMHG
BH CV ECHO MEAS - AO MEAN PG: 4 MMHG
BH CV ECHO MEAS - AO ROOT AREA (BSA CORRECTED): 1.5
BH CV ECHO MEAS - AO ROOT AREA: 8 CM^2
BH CV ECHO MEAS - AO ROOT DIAM: 3.2 CM
BH CV ECHO MEAS - AO V2 MAX: 133 CM/SEC
BH CV ECHO MEAS - AO V2 MEAN: 96.8 CM/SEC
BH CV ECHO MEAS - AO V2 VTI: 22.3 CM
BH CV ECHO MEAS - AVA(I,A): 1.9 CM^2
BH CV ECHO MEAS - AVA(I,D): 1.9 CM^2
BH CV ECHO MEAS - AVA(V,A): 1.8 CM^2
BH CV ECHO MEAS - AVA(V,D): 1.8 CM^2
BH CV ECHO MEAS - BSA(HAYCOCK): 2.2 M^2
BH CV ECHO MEAS - BSA: 2.1 M^2
BH CV ECHO MEAS - BZI_BMI: 34 KILOGRAMS/M^2
BH CV ECHO MEAS - BZI_METRIC_HEIGHT: 170.2 CM
BH CV ECHO MEAS - BZI_METRIC_WEIGHT: 98.4 KG
BH CV ECHO MEAS - CONTRAST EF 4CH: 33 CM2
BH CV ECHO MEAS - EDV(CUBED): 79.5 ML
BH CV ECHO MEAS - EDV(MOD-SP2): 123 ML
BH CV ECHO MEAS - EDV(MOD-SP4): 137 ML
BH CV ECHO MEAS - EDV(TEICH): 83.1 ML
BH CV ECHO MEAS - EF(CUBED): 31 %
BH CV ECHO MEAS - EF(MOD-BP): 32.8 %
BH CV ECHO MEAS - EF(MOD-SP2): 31.7 %
BH CV ECHO MEAS - EF(MOD-SP4): 32.8 %
BH CV ECHO MEAS - EF(TEICH): 25.4 %
BH CV ECHO MEAS - ESV(CUBED): 54.9 ML
BH CV ECHO MEAS - ESV(MOD-SP2): 84 ML
BH CV ECHO MEAS - ESV(MOD-SP4): 92 ML
BH CV ECHO MEAS - ESV(TEICH): 62 ML
BH CV ECHO MEAS - FS: 11.6 %
BH CV ECHO MEAS - IVS/LVPW: 1.2
BH CV ECHO MEAS - IVSD: 1.3 CM
BH CV ECHO MEAS - LAT PEAK E' VEL: 8.5 CM/SEC
BH CV ECHO MEAS - LV DIASTOLIC VOL/BSA (35-75): 65.4 ML/M^2
BH CV ECHO MEAS - LV MASS(C)D: 184.7 GRAMS
BH CV ECHO MEAS - LV MASS(C)DI: 88.2 GRAMS/M^2
BH CV ECHO MEAS - LV MAX PG: 2.5 MMHG
BH CV ECHO MEAS - LV MEAN PG: 1 MMHG
BH CV ECHO MEAS - LV SYSTOLIC VOL/BSA (12-30): 43.9 ML/M^2
BH CV ECHO MEAS - LV V1 MAX: 78.3 CM/SEC
BH CV ECHO MEAS - LV V1 MEAN: 52 CM/SEC
BH CV ECHO MEAS - LV V1 VTI: 13.4 CM
BH CV ECHO MEAS - LVIDD: 4.3 CM
BH CV ECHO MEAS - LVIDS: 3.8 CM
BH CV ECHO MEAS - LVLD AP2: 8.3 CM
BH CV ECHO MEAS - LVLD AP4: 8.7 CM
BH CV ECHO MEAS - LVLS AP2: 8 CM
BH CV ECHO MEAS - LVLS AP4: 8.3 CM
BH CV ECHO MEAS - LVOT AREA (M): 3.1 CM^2
BH CV ECHO MEAS - LVOT AREA: 3.1 CM^2
BH CV ECHO MEAS - LVOT DIAM: 2 CM
BH CV ECHO MEAS - LVPWD: 1.1 CM
BH CV ECHO MEAS - MED PEAK E' VEL: 3.7 CM/SEC
BH CV ECHO MEAS - MV A DUR: 0.11 SEC
BH CV ECHO MEAS - MV A MAX VEL: 109 CM/SEC
BH CV ECHO MEAS - MV DEC SLOPE: 338 CM/SEC^2
BH CV ECHO MEAS - MV DEC TIME: 120 SEC
BH CV ECHO MEAS - MV E MAX VEL: 92.7 CM/SEC
BH CV ECHO MEAS - MV E/A: 0.85
BH CV ECHO MEAS - MV MAX PG: 5.4 MMHG
BH CV ECHO MEAS - MV MEAN PG: 3 MMHG
BH CV ECHO MEAS - MV P1/2T MAX VEL: 98.5 CM/SEC
BH CV ECHO MEAS - MV P1/2T: 85.4 MSEC
BH CV ECHO MEAS - MV V2 MAX: 116 CM/SEC
BH CV ECHO MEAS - MV V2 MEAN: 81.3 CM/SEC
BH CV ECHO MEAS - MV V2 VTI: 30.7 CM
BH CV ECHO MEAS - MVA P1/2T LCG: 2.2 CM^2
BH CV ECHO MEAS - MVA(P1/2T): 2.6 CM^2
BH CV ECHO MEAS - MVA(VTI): 1.4 CM^2
BH CV ECHO MEAS - PA ACC TIME: 0.09 SEC
BH CV ECHO MEAS - PA MAX PG (FULL): 1.3 MMHG
BH CV ECHO MEAS - PA MAX PG: 2 MMHG
BH CV ECHO MEAS - PA PR(ACCEL): 38.5 MMHG
BH CV ECHO MEAS - PA V2 MAX: 70.6 CM/SEC
BH CV ECHO MEAS - RAP SYSTOLE: 8 MMHG
BH CV ECHO MEAS - RV MAX PG: 0.66 MMHG
BH CV ECHO MEAS - RV MEAN PG: 0 MMHG
BH CV ECHO MEAS - RV V1 MAX: 40.5 CM/SEC
BH CV ECHO MEAS - RV V1 MEAN: 24.6 CM/SEC
BH CV ECHO MEAS - RV V1 VTI: 4.4 CM
BH CV ECHO MEAS - SI(AO): 85.7 ML/M^2
BH CV ECHO MEAS - SI(CUBED): 11.8 ML/M^2
BH CV ECHO MEAS - SI(LVOT): 20.1 ML/M^2
BH CV ECHO MEAS - SI(MOD-SP2): 18.6 ML/M^2
BH CV ECHO MEAS - SI(MOD-SP4): 21.5 ML/M^2
BH CV ECHO MEAS - SI(TEICH): 10.1 ML/M^2
BH CV ECHO MEAS - SV(AO): 179.3 ML
BH CV ECHO MEAS - SV(CUBED): 24.6 ML
BH CV ECHO MEAS - SV(LVOT): 42.1 ML
BH CV ECHO MEAS - SV(MOD-SP2): 39 ML
BH CV ECHO MEAS - SV(MOD-SP4): 45 ML
BH CV ECHO MEAS - SV(TEICH): 21.1 ML
BH CV ECHO MEAS - TAPSE (>1.6): 1.7 CM
BH CV ECHO MEASUREMENTS AVERAGE E/E' RATIO: 15.2
BH CV VAS BP RIGHT ARM: NORMAL MMHG
BH CV XLRA - RV BASE: 2.8 CM
BH CV XLRA - RV LENGTH: 7.3 CM
BH CV XLRA - RV MID: 1.6 CM
BH CV XLRA - TDI S': 12.7 CM/SEC
BILIRUB SERPL-MCNC: 0.6 MG/DL (ref 0–1.2)
BUN SERPL-MCNC: 11 MG/DL (ref 8–23)
BUN/CREAT SERPL: 16.7 (ref 7–25)
CALCIUM SPEC-SCNC: 9 MG/DL (ref 8.6–10.5)
CHLORIDE SERPL-SCNC: 102 MMOL/L (ref 98–107)
CHOLEST SERPL-MCNC: 143 MG/DL (ref 0–200)
CO2 SERPL-SCNC: 28.2 MMOL/L (ref 22–29)
CREAT SERPL-MCNC: 0.66 MG/DL (ref 0.76–1.27)
DEPRECATED RDW RBC AUTO: 46.4 FL (ref 37–54)
EOSINOPHIL # BLD AUTO: 0.22 10*3/MM3 (ref 0–0.4)
EOSINOPHIL NFR BLD AUTO: 2.2 % (ref 0.3–6.2)
ERYTHROCYTE [DISTWIDTH] IN BLOOD BY AUTOMATED COUNT: 13.3 % (ref 12.3–15.4)
GFR SERPL CREATININE-BSD FRML MDRD: 121 ML/MIN/1.73
GLOBULIN UR ELPH-MCNC: 2.7 GM/DL
GLUCOSE BLDC GLUCOMTR-MCNC: 159 MG/DL (ref 70–130)
GLUCOSE BLDC GLUCOMTR-MCNC: 173 MG/DL (ref 70–130)
GLUCOSE BLDC GLUCOMTR-MCNC: 213 MG/DL (ref 70–130)
GLUCOSE BLDC GLUCOMTR-MCNC: 246 MG/DL (ref 70–130)
GLUCOSE SERPL-MCNC: 164 MG/DL (ref 65–99)
HBA1C MFR BLD: 9.7 % (ref 4.8–5.6)
HCT VFR BLD AUTO: 51.2 % (ref 37.5–51)
HDLC SERPL-MCNC: 39 MG/DL (ref 40–60)
HGB BLD-MCNC: 16.2 G/DL (ref 13–17.7)
IMM GRANULOCYTES # BLD AUTO: 0.04 10*3/MM3 (ref 0–0.05)
IMM GRANULOCYTES NFR BLD AUTO: 0.4 % (ref 0–0.5)
LDLC SERPL CALC-MCNC: 84 MG/DL (ref 0–100)
LDLC/HDLC SERPL: 2.12 {RATIO}
LEFT ATRIUM VOLUME INDEX: 16 ML/M2
LYMPHOCYTES # BLD AUTO: 2.19 10*3/MM3 (ref 0.7–3.1)
LYMPHOCYTES NFR BLD AUTO: 21.9 % (ref 19.6–45.3)
MCH RBC QN AUTO: 29.5 PG (ref 26.6–33)
MCHC RBC AUTO-ENTMCNC: 31.6 G/DL (ref 31.5–35.7)
MCV RBC AUTO: 93.3 FL (ref 79–97)
MONOCYTES # BLD AUTO: 1.1 10*3/MM3 (ref 0.1–0.9)
MONOCYTES NFR BLD AUTO: 11 % (ref 5–12)
NEUTROPHILS NFR BLD AUTO: 6.42 10*3/MM3 (ref 1.7–7)
NEUTROPHILS NFR BLD AUTO: 64 % (ref 42.7–76)
NRBC BLD AUTO-RTO: 0 /100 WBC (ref 0–0.2)
NT-PROBNP SERPL-MCNC: 821 PG/ML (ref 0–900)
PLATELET # BLD AUTO: 174 10*3/MM3 (ref 140–450)
PMV BLD AUTO: 11.9 FL (ref 6–12)
POTASSIUM SERPL-SCNC: 3.4 MMOL/L (ref 3.5–5.2)
POTASSIUM SERPL-SCNC: 4.2 MMOL/L (ref 3.5–5.2)
PROT SERPL-MCNC: 6.2 G/DL (ref 6–8.5)
QT INTERVAL: 373 MS
RBC # BLD AUTO: 5.49 10*6/MM3 (ref 4.14–5.8)
SODIUM SERPL-SCNC: 143 MMOL/L (ref 136–145)
T4 FREE SERPL-MCNC: 1.15 NG/DL (ref 0.93–1.7)
TRIGL SERPL-MCNC: 107 MG/DL (ref 0–150)
TROPONIN T SERPL-MCNC: 0.19 NG/ML (ref 0–0.03)
TSH SERPL DL<=0.05 MIU/L-ACNC: 0.87 UIU/ML (ref 0.27–4.2)
VLDLC SERPL-MCNC: 20 MG/DL (ref 5–40)
WBC # BLD AUTO: 10.02 10*3/MM3 (ref 3.4–10.8)

## 2021-09-10 PROCEDURE — 83036 HEMOGLOBIN GLYCOSYLATED A1C: CPT | Performed by: HOSPITALIST

## 2021-09-10 PROCEDURE — 83880 ASSAY OF NATRIURETIC PEPTIDE: CPT | Performed by: HOSPITALIST

## 2021-09-10 PROCEDURE — 85730 THROMBOPLASTIN TIME PARTIAL: CPT | Performed by: INTERNAL MEDICINE

## 2021-09-10 PROCEDURE — 84484 ASSAY OF TROPONIN QUANT: CPT | Performed by: INTERNAL MEDICINE

## 2021-09-10 PROCEDURE — 63710000001 INSULIN GLARGINE PER 5 UNITS: Performed by: HOSPITALIST

## 2021-09-10 PROCEDURE — 80053 COMPREHEN METABOLIC PANEL: CPT | Performed by: HOSPITALIST

## 2021-09-10 PROCEDURE — 84132 ASSAY OF SERUM POTASSIUM: CPT | Performed by: INTERNAL MEDICINE

## 2021-09-10 PROCEDURE — 93010 ELECTROCARDIOGRAM REPORT: CPT | Performed by: INTERNAL MEDICINE

## 2021-09-10 PROCEDURE — 82962 GLUCOSE BLOOD TEST: CPT

## 2021-09-10 PROCEDURE — 80061 LIPID PANEL: CPT | Performed by: HOSPITALIST

## 2021-09-10 PROCEDURE — 84439 ASSAY OF FREE THYROXINE: CPT | Performed by: HOSPITALIST

## 2021-09-10 PROCEDURE — 25010000002 FUROSEMIDE PER 20 MG: Performed by: INTERNAL MEDICINE

## 2021-09-10 PROCEDURE — 84443 ASSAY THYROID STIM HORMONE: CPT | Performed by: HOSPITALIST

## 2021-09-10 PROCEDURE — 99232 SBSQ HOSP IP/OBS MODERATE 35: CPT | Performed by: INTERNAL MEDICINE

## 2021-09-10 PROCEDURE — 63710000001 INSULIN LISPRO (HUMAN) PER 5 UNITS: Performed by: HOSPITALIST

## 2021-09-10 PROCEDURE — 93005 ELECTROCARDIOGRAM TRACING: CPT | Performed by: INTERNAL MEDICINE

## 2021-09-10 PROCEDURE — 85025 COMPLETE CBC W/AUTO DIFF WBC: CPT | Performed by: INTERNAL MEDICINE

## 2021-09-10 RX ORDER — LISINOPRIL 20 MG/1
20 TABLET ORAL EVERY 12 HOURS SCHEDULED
Status: DISCONTINUED | OUTPATIENT
Start: 2021-09-10 | End: 2021-09-11

## 2021-09-10 RX ORDER — CLOTRIMAZOLE AND BETAMETHASONE DIPROPIONATE 10; .64 MG/G; MG/G
1 CREAM TOPICAL EVERY 12 HOURS SCHEDULED
Status: DISCONTINUED | OUTPATIENT
Start: 2021-09-10 | End: 2021-09-15 | Stop reason: HOSPADM

## 2021-09-10 RX ORDER — INSULIN LISPRO 100 [IU]/ML
3 INJECTION, SOLUTION INTRAVENOUS; SUBCUTANEOUS
Status: DISCONTINUED | OUTPATIENT
Start: 2021-09-10 | End: 2021-09-11

## 2021-09-10 RX ORDER — INSULIN GLARGINE 100 [IU]/ML
10 INJECTION, SOLUTION SUBCUTANEOUS NIGHTLY
Status: DISCONTINUED | OUTPATIENT
Start: 2021-09-10 | End: 2021-09-11

## 2021-09-10 RX ORDER — CETIRIZINE HYDROCHLORIDE 10 MG/1
10 TABLET ORAL DAILY
Status: DISCONTINUED | OUTPATIENT
Start: 2021-09-10 | End: 2021-09-15 | Stop reason: HOSPADM

## 2021-09-10 RX ORDER — POTASSIUM CHLORIDE 1.5 G/1.77G
40 POWDER, FOR SOLUTION ORAL AS NEEDED
Status: DISCONTINUED | OUTPATIENT
Start: 2021-09-10 | End: 2021-09-15 | Stop reason: HOSPADM

## 2021-09-10 RX ORDER — POTASSIUM CHLORIDE 750 MG/1
40 TABLET, FILM COATED, EXTENDED RELEASE ORAL AS NEEDED
Status: DISCONTINUED | OUTPATIENT
Start: 2021-09-10 | End: 2021-09-15 | Stop reason: HOSPADM

## 2021-09-10 RX ADMIN — INSULIN LISPRO 3 UNITS: 100 INJECTION, SOLUTION INTRAVENOUS; SUBCUTANEOUS at 16:45

## 2021-09-10 RX ADMIN — ATORVASTATIN CALCIUM 40 MG: 20 TABLET, FILM COATED ORAL at 21:02

## 2021-09-10 RX ADMIN — LISINOPRIL 20 MG: 20 TABLET ORAL at 08:35

## 2021-09-10 RX ADMIN — METOPROLOL TARTRATE 25 MG: 25 TABLET ORAL at 21:01

## 2021-09-10 RX ADMIN — LISINOPRIL 20 MG: 20 TABLET ORAL at 21:01

## 2021-09-10 RX ADMIN — CLOTRIMAZOLE AND BETAMETHASONE DIPROPIONATE 1 APPLICATION: 10; .5 CREAM TOPICAL at 21:02

## 2021-09-10 RX ADMIN — INSULIN LISPRO 2 UNITS: 100 INJECTION, SOLUTION INTRAVENOUS; SUBCUTANEOUS at 06:10

## 2021-09-10 RX ADMIN — INSULIN LISPRO 3 UNITS: 100 INJECTION, SOLUTION INTRAVENOUS; SUBCUTANEOUS at 16:44

## 2021-09-10 RX ADMIN — ASPIRIN 81 MG: 81 TABLET, COATED ORAL at 08:34

## 2021-09-10 RX ADMIN — INSULIN LISPRO 3 UNITS: 100 INJECTION, SOLUTION INTRAVENOUS; SUBCUTANEOUS at 12:25

## 2021-09-10 RX ADMIN — POTASSIUM CHLORIDE 40 MEQ: 750 TABLET, EXTENDED RELEASE ORAL at 08:36

## 2021-09-10 RX ADMIN — CETIRIZINE HYDROCHLORIDE 10 MG: 10 TABLET ORAL at 21:01

## 2021-09-10 RX ADMIN — METOPROLOL TARTRATE 25 MG: 25 TABLET ORAL at 08:35

## 2021-09-10 RX ADMIN — ACETAMINOPHEN 650 MG: 325 TABLET, FILM COATED ORAL at 00:25

## 2021-09-10 RX ADMIN — CLOPIDOGREL BISULFATE 75 MG: 75 TABLET, FILM COATED ORAL at 08:35

## 2021-09-10 RX ADMIN — FUROSEMIDE 40 MG: 10 INJECTION, SOLUTION INTRAMUSCULAR; INTRAVENOUS at 00:25

## 2021-09-10 RX ADMIN — PETROLATUM 1 APPLICATION: 420 OINTMENT TOPICAL at 08:35

## 2021-09-10 RX ADMIN — PANTOPRAZOLE SODIUM 40 MG: 40 TABLET, DELAYED RELEASE ORAL at 06:07

## 2021-09-10 RX ADMIN — INSULIN GLARGINE 10 UNITS: 100 INJECTION, SOLUTION SUBCUTANEOUS at 21:02

## 2021-09-10 RX ADMIN — POTASSIUM CHLORIDE 40 MEQ: 750 TABLET, EXTENDED RELEASE ORAL at 12:25

## 2021-09-10 RX ADMIN — FUROSEMIDE 40 MG: 10 INJECTION, SOLUTION INTRAMUSCULAR; INTRAVENOUS at 12:25

## 2021-09-10 RX ADMIN — SODIUM CHLORIDE, PRESERVATIVE FREE 10 ML: 5 INJECTION INTRAVENOUS at 21:02

## 2021-09-10 NOTE — CONSULTS
Met with patient, discussed benefits of cardiac rehab. Patient states he has attended a program in the past, not sure if he wants to attend again. Provided phase II information along with the contact information for cardiac rehab here at [facility]. Patient will call if interested in attending.

## 2021-09-10 NOTE — PROGRESS NOTES
"    Patient Name: Matt Cruz  :1955  65 y.o.      Patient Care Team:  Yelena Jacobson APRN as PCP - General (Nurse Practitioner)    Chief Complaint: CAD, CHF    Interval History:   S/p circ PCI. Occluded RCA, diffusely diseased LAD.   Angina resolved. Breathing continues to be difficult back pain also    Objective   Vital Signs  Temp:  [97.5 °F (36.4 °C)-98.4 °F (36.9 °C)] 97.9 °F (36.6 °C)  Heart Rate:  [85-94] 89  Resp:  [] 20  BP: (124-169)/(70-95) 169/94    Intake/Output Summary (Last 24 hours) at 9/10/2021 0856  Last data filed at 9/10/2021 0836  Gross per 24 hour   Intake 440 ml   Output 2350 ml   Net -1910 ml     Flowsheet Rows      First Filed Value   Admission Height  170.2 cm (67\") Documented at 2021 1655   Admission Weight  102 kg (225 lb) Documented at 2021 1655          Physical Exam:   General Appearance:    Alert, cooperative, in no acute distress   Lungs:    b/l rales     Heart:    Regular rhythm and normal rate, normal S1 and S2, no murmurs, gallops or rubs.     Chest Wall:    No abnormalities observed   Abdomen:     Soft, nontender, positive bowel sounds.     Extremities: +2 edema b/l, erythematous      Results Review:    Results from last 7 days   Lab Units 09/10/21  0440   SODIUM mmol/L 143   POTASSIUM mmol/L 3.4*   CHLORIDE mmol/L 102   CO2 mmol/L 28.2   BUN mg/dL 11   CREATININE mg/dL 0.66*   GLUCOSE mg/dL 164*   CALCIUM mg/dL 9.0     Results from last 7 days   Lab Units 09/10/21  0440 21  0641 21  1703   TROPONIN T ng/mL 0.193* 0.118* 0.069*     Results from last 7 days   Lab Units 09/10/21  0440   WBC 10*3/mm3 10.02   HEMOGLOBIN g/dL 16.2   HEMATOCRIT % 51.2*   PLATELETS 10*3/mm3 174     Results from last 7 days   Lab Units 09/10/21  0440 21  0641 21  2328 21  1532 21  1532   INR   --   --   --   --  1.00   APTT seconds 29.8 40.6* 32.3   < > 27.8    < > = values in this interval not displayed.         Results from last  " days   Lab Units 09/10/21  0440   CHOLESTEROL mg/dL 143   TRIGLYCERIDES mg/dL 107   HDL CHOL mg/dL 39*   LDL CHOL mg/dL 84               Medication Review:   aspirin, 81 mg, Oral, Daily  atorvastatin, 40 mg, Oral, Nightly  clopidogrel, 75 mg, Oral, Daily  furosemide, 40 mg, Intravenous, Once  furosemide, 40 mg, Intravenous, Q12H  insulin lispro, 0-7 Units, Subcutaneous, TID AC  lisinopril, 20 mg, Oral, Q24H  metoprolol tartrate, 25 mg, Oral, Q12H  pantoprazole, 40 mg, Oral, Q AM  Petrolatum, 1 application, Topical, Q24H              Assessment/Plan   1. CAD  S/p circ intervention, diffusely diseased LAD,  RCA  ASA, Plavix, [id number] BID  2. Acute decompensated CHF systolic ischemic CM EF 35%  Lasix 40 IV BID, lisinopril, bb  likey will need aldactone also  3.Rash on leg? Defer to medicine, looks splotchier than regular edematous changes    Haleigh Powell MD  [facility]  09/10/21  08:56 EDT

## 2021-09-10 NOTE — PROGRESS NOTES
"Daily progress note    Chief complaint  Doing same  Still short of breath  Denies any chest pain or palpitation  No new complaints    History of present illness  65-year-old white male with history of diabetes hypertension hyperlipidemia and known coronary artery disease admitted to emergency room with intermittent chest pain for last several weeks with shortness of breath and nausea but no vomiting.  Patient also stated he gets nonradiating pain throbbing type but sometimes get diaphoretic.  I am asked to follow the patient for medical problem.  Patient is chest pain-free at the time of interview.  Patient also denies any fever cough congestion night sweats weight loss or weight gain.  Patient underwent cardiac cath this morning secondary to non-ST elevation MI and had angioplasty and stent placed.  And asked to follow the patient for medical problems specially diabetes.     REVIEW OF SYSTEMS  Unremarkable except shortness of breath    PHYSICAL EXAM   Blood pressure 167/94, pulse (P) 103, temperature (P) 98.3 °F (36.8 °C), temperature source (P) Oral, resp. rate (P) 20, height 170.2 cm (67\"), weight 98.6 kg (217 lb 6.4 oz), SpO2 94 %.    Constitutional: Pt. is oriented to person, place, and time and well-developed, well-nourished, and in no distress.  He is disheveled.  HENT: Normocephalic and atraumatic. Oropharynx moist/nonerythematous.  Neck: Normal range of motion. Neck supple. No JVD present.   Cardiovascular: Normal rate, regular rhythm and normal heart sounds. Exam reveals no gallop and no friction rub.   No murmur heard.  Pulmonary/Chest: Effort normal and breath sounds normal. No stridor. No respiratory distress. No wheezes, no rales.   Abdominal: Soft. Bowel sounds are normal. No distension. There is no tenderness. There is no rebound and no guarding.   Musculoskeletal: Normal range of motion. No edema, tenderness or deformity.   Neurological: Pt. is alert and oriented to person, place, and time.  He has " no focal neurologic deficits  Skin: Skin is warm and dry.  He has an eczematous, flaking rash over his lower extremities that he states has been there for years.  Pt. is not diaphoretic. No erythema.   Psychiatric: Mood, affect and judgment normal.  He is pleasant and cooperative.    LAB RESULTS  Lab Results (last 24 hours)     Procedure Component Value Units Date/Time    POC Glucose Once [[id number]]  (Abnormal) Collected: 09/10/21 1534    Specimen: Blood Updated: 09/10/21 1536     Glucose 213 mg/dL      Comment: Meter: [id number] : [id number] Hobbs Laikesha PCA       POC Glucose Once [[id number]]  (Abnormal) Collected: 09/10/21 1031    Specimen: Blood Updated: 09/10/21 1032     Glucose 246 mg/dL      Comment: Meter: [id number] : [id number] Hobbs Laikesha PCA       POC Activated Clotting Time [[id number]]  (Abnormal) Collected: 09/09/21 1110    Specimen: Blood Updated: 09/10/21 0749     Activated Clotting Time  241 Seconds      Comment: Serial Number: [id number]:  [id number]       POC Activated Clotting Time [[id number]]  (Abnormal) Collected: 09/09/21 1100    Specimen: Blood Updated: 09/10/21 0749     Activated Clotting Time  219 Seconds      Comment: Serial Number: [id number]:  [id number]       POC Activated Clotting Time [[id number]]  (Abnormal) Collected: 09/09/21 1048    Specimen: Blood Updated: 09/10/21 0749     Activated Clotting Time  186 Seconds      Comment: Serial Number: [id number]:  [id number]       Troponin [[id number]]  (Abnormal) Collected: 09/10/21 0440    Specimen: Blood Updated: 09/10/21 0620     Troponin T 0.193 ng/mL     Narrative:      Troponin T Reference Range:  <= 0.03 ng/mL-   Negative for AMI  >0.03 ng/mL-     Abnormal for myocardial necrosis.  Clinicians would have to utilize clinical acumen, EKG, Troponin and serial changes to determine if it is an Acute Myocardial Infarction or myocardial injury due to an underlying chronic condition.       Results may be falsely decreased if patient  taking Biotin.      aPTT [[id number]]  (Normal) Collected: 09/10/21 0440    Specimen: Blood Updated: 09/10/21 0616     PTT 29.8 seconds     TSH [[id number]]  (Normal) Collected: 09/10/21 0440    Specimen: Blood Updated: 09/10/21 0613     TSH 0.869 uIU/mL     BNP [[id number]]  (Normal) Collected: 09/10/21 0440    Specimen: Blood Updated: 09/10/21 0613     proBNP 821.0 pg/mL     Narrative:      Among patients with dyspnea, NT-proBNP is highly sensitive for the detection of acute congestive heart failure. In addition NT-proBNP of <300 pg/ml effectively rules out acute congestive heart failure with 99% negative predictive value.    Results may be falsely decreased if patient taking Biotin.      Comprehensive Metabolic Panel [[id number]]  (Abnormal) Collected: 09/10/21 0440    Specimen: Blood Updated: 09/10/21 0607     Glucose 164 mg/dL      BUN 11 mg/dL      Creatinine 0.66 mg/dL      Sodium 143 mmol/L      Potassium 3.4 mmol/L      Chloride 102 mmol/L      CO2 28.2 mmol/L      Calcium 9.0 mg/dL      Total Protein 6.2 g/dL      Albumin 3.50 g/dL      ALT (SGPT) 25 U/L      AST (SGOT) 15 U/L      Alkaline Phosphatase 101 U/L      Total Bilirubin 0.6 mg/dL      eGFR Non African Amer 121 mL/min/1.73      Globulin 2.7 gm/dL      A/G Ratio 1.3 g/dL      BUN/Creatinine Ratio 16.7     Anion Gap 12.8 mmol/L     Narrative:      GFR Normal >60  Chronic Kidney Disease <60  Kidney Failure <15      Lipid Panel [[id number]]  (Abnormal) Collected: 09/10/21 0440    Specimen: Blood Updated: 09/10/21 0607     Total Cholesterol 143 mg/dL      Triglycerides 107 mg/dL      HDL Cholesterol 39 mg/dL      LDL Cholesterol  84 mg/dL      VLDL Cholesterol 20 mg/dL      LDL/HDL Ratio 2.12    Narrative:      Cholesterol Reference Ranges  (U.S. Department of Health and Human Services ATP III Classifications)    Desirable          <200 mg/dL  Borderline High    200-239 mg/dL  High Risk          >240 mg/dL      Triglyceride Reference Ranges  ([place]  Department of Health and Human Services ATP III Classifications)    Normal           <150 mg/dL  Borderline High  150-199 mg/dL  High             200-499 mg/dL  Very High        >500 mg/dL    HDL Reference Ranges  (U.S. Department of Health and Human Services ATP III Classifcations)    Low     <40 mg/dl (major risk factor for CHD)  High    >60 mg/dl ('negative' risk factor for CHD)        LDL Reference Ranges  (U.S. Department of Health and Human Services ATP III Classifcations)    Optimal          <100 mg/dL  Near Optimal     100-129 mg/dL  Borderline High  130-159 mg/dL  High             160-189 mg/dL  Very High        >189 mg/dL    POC Glucose Once [[id number]]  (Abnormal) Collected: 09/10/21 0546    Specimen: Blood Updated: 09/10/21 0548     Glucose 159 mg/dL      Comment: Meter: [id number] : [id number] Juanita LOPEZ       Hemoglobin A1c [[id number]]  (Abnormal) Collected: 09/10/21 0440    Specimen: Blood Updated: 09/10/21 0539     Hemoglobin A1C 9.70 %     Narrative:      Hemoglobin A1C Ranges:    Increased Risk for Diabetes  5.7% to 6.4%  Diabetes                     >= 6.5%  Diabetic Goal                < 7.0%    CBC & Differential [[id number]]  (Abnormal) Collected: 09/10/21 0440    Specimen: Blood Updated: 09/10/21 0530    Narrative:      The following orders were created for panel order CBC & Differential.  Procedure                               Abnormality         Status                     ---------                               -----------         ------                     CBC Auto Differential[[id number]]        Abnormal            Final result                 Please view results for these tests on the individual orders.    CBC Auto Differential [[id number]]  (Abnormal) Collected: 09/10/21 0440    Specimen: Blood Updated: 09/10/21 0530     WBC 10.02 10*3/mm3      RBC 5.49 10*6/mm3      Hemoglobin 16.2 g/dL      Hematocrit 51.2 %      MCV 93.3 fL      MCH 29.5 pg      MCHC 31.6 g/dL      RDW 13.3 %       RDW-SD 46.4 fl      MPV 11.9 fL      Platelets 174 10*3/mm3      Neutrophil % 64.0 %      Lymphocyte % 21.9 %      Monocyte % 11.0 %      Eosinophil % 2.2 %      Basophil % 0.5 %      Immature Grans % 0.4 %      Neutrophils, Absolute 6.42 10*3/mm3      Lymphocytes, Absolute 2.19 10*3/mm3      Monocytes, Absolute 1.10 10*3/mm3      Eosinophils, Absolute 0.22 10*3/mm3      Basophils, Absolute 0.05 10*3/mm3      Immature Grans, Absolute 0.04 10*3/mm3      nRBC 0.0 /100 WBC     POC Glucose Once [[id number]]  (Abnormal) Collected: 09/09/21 2120    Specimen: Blood Updated: 09/09/21 2121     Glucose 248 mg/dL      Comment: Meter: [id number] : [id number] Juanita Thomas JOHN              Study Result    Narrative & Impression   XR CHEST 2 VW-     HISTORY: Male who is 65 years-old,  chest pain     TECHNIQUE: Frontal and lateral views of the chest     COMPARISON: 04/13/2018     FINDINGS: The heart size appears borderline. Pulmonary vasculature is  unremarkable. Mild right and mild to moderate left pleural effusions and  bilateral basilar atelectasis or infiltrate are seen. No pneumothorax.  No acute osseous process.     IMPRESSION:  Mild right and mild to moderate left pleural effusions and  bilateral basilar atelectasis or infiltrate            ECG 12 Lead  Component   Ref Range & Units 9/9/21 0241 9/8/21 2031   QT Interval   ms 394 P  393         HEART RATE= 82  bpm  RR Interval= 732  ms  IL Interval= 203  ms  P Horizontal Axis= -32  deg  P Front Axis= 53  deg  QRSD Interval= 97  ms  QT Interval= 394  ms  QRS Axis= -19  deg  T Wave Axis= 158  deg  - ABNORMAL ECG -  Sinus rhythm  Probable left atrial enlargement  Borderline left axis deviation  Consider anterior infarct  Abnormal T, consider ischemia, lateral leads               Current Facility-Administered Medications:   •  acetaminophen (TYLENOL) tablet 650 mg, 650 mg, Oral, Q4H PRN, Zane De La O MD, 650 mg at 09/10/21 0025  •  aspirin EC tablet 81 mg, 81 mg,  Oral, Daily, Zane De La O MD, 81 mg at 09/10/21 0834  •  atorvastatin (LIPITOR) tablet 40 mg, 40 mg, Oral, Nightly, Zane De La O MD, 40 mg at 09/09/21 2045  •  [DISCONTINUED] clopidogrel (PLAVIX) tablet 600 mg, 600 mg, Oral, Once **AND** clopidogrel (PLAVIX) tablet 75 mg, 75 mg, Oral, Daily, Zane De La O MD, 75 mg at 09/10/21 0835  •  furosemide (LASIX) injection 40 mg, 40 mg, Intravenous, Once, Zane De La O MD  •  furosemide (LASIX) injection 40 mg, 40 mg, Intravenous, Q12H, Zane De La O MD, 40 mg at 09/10/21 1225  •  heparin (porcine) 5000 UNIT/ML injection 3,100-4,000 Units, 30-39.2 Units/kg, Intravenous, Q6H PRN, Zane De La O MD, 4,000 Units at 09/09/21 0043  •  insulin lispro (ADMELOG) injection 0-7 Units, 0-7 Units, Subcutaneous, TID AC, Taj Liriano MD, 3 Units at 09/10/21 1225  •  lisinopril (PRINIVIL,ZESTRIL) tablet 20 mg, 20 mg, Oral, Q24H, Zane De La O MD, 20 mg at 09/10/21 0835  •  metoprolol tartrate (LOPRESSOR) tablet 25 mg, 25 mg, Oral, Q12H, Zane De La O MD, 25 mg at 09/10/21 0835  •  pantoprazole (PROTONIX) EC tablet 40 mg, 40 mg, Oral, Q AM, Taj Liriano MD, 40 mg at 09/10/21 0607  •  Petrolatum ointment 1 application, 1 application, Topical, Q24H, Taj Liriano MD, 1 application at 09/10/21 0835  •  potassium chloride (K-DUR,KLOR-CON) ER tablet 40 mEq, 40 mEq, Oral, PRN, Haleigh Powell MD, 40 mEq at 09/10/21 1225  •  potassium chloride (KLOR-CON) packet 40 mEq, 40 mEq, Oral, PRN, Haleigh Powell MD  •  sodium chloride 0.9 % flush 10 mL, 10 mL, Intravenous, PRN, Zane De La O MD     ASSESSMENT  Acute non-ST elevation MI status post angioplasty with stent placement  Coronary artery disease  Acute systolic congestive heart failure  Diabetes mellitus  Hypertension  Hyperlipidemia  Chronic bilateral lower extremity rash  Gastroesophageal reflux disease    PLAN  CPM  Post PCI care  Aspirin Plavix Lopressor Zestril Lipitor  IV  diuresis  Routine care for chronic rash  Stress ulcer prophylaxis  Accu-Chek with sliding scale insulin  Supportive care  PT/OT  Discussed with nursing staff  Will follow with Dr.DAS DELMI VENCES MD

## 2021-09-11 LAB
ANION GAP SERPL CALCULATED.3IONS-SCNC: 12.5 MMOL/L (ref 5–15)
APTT PPP: 27.3 SECONDS (ref 22.7–35.4)
BASOPHILS # BLD AUTO: 0.05 10*3/MM3 (ref 0–0.2)
BASOPHILS NFR BLD AUTO: 0.5 % (ref 0–1.5)
BUN SERPL-MCNC: 13 MG/DL (ref 8–23)
BUN/CREAT SERPL: 21 (ref 7–25)
CALCIUM SPEC-SCNC: 9.4 MG/DL (ref 8.6–10.5)
CHLORIDE SERPL-SCNC: 95 MMOL/L (ref 98–107)
CO2 SERPL-SCNC: 29.5 MMOL/L (ref 22–29)
CREAT SERPL-MCNC: 0.62 MG/DL (ref 0.76–1.27)
DEPRECATED RDW RBC AUTO: 45.5 FL (ref 37–54)
EOSINOPHIL # BLD AUTO: 0.23 10*3/MM3 (ref 0–0.4)
EOSINOPHIL NFR BLD AUTO: 2.1 % (ref 0.3–6.2)
ERYTHROCYTE [DISTWIDTH] IN BLOOD BY AUTOMATED COUNT: 13.4 % (ref 12.3–15.4)
GFR SERPL CREATININE-BSD FRML MDRD: 130 ML/MIN/1.73
GLUCOSE BLDC GLUCOMTR-MCNC: 181 MG/DL (ref 70–130)
GLUCOSE BLDC GLUCOMTR-MCNC: 200 MG/DL (ref 70–130)
GLUCOSE BLDC GLUCOMTR-MCNC: 233 MG/DL (ref 70–130)
GLUCOSE BLDC GLUCOMTR-MCNC: 251 MG/DL (ref 70–130)
GLUCOSE SERPL-MCNC: 171 MG/DL (ref 65–99)
HCT VFR BLD AUTO: 51.9 % (ref 37.5–51)
HGB BLD-MCNC: 16.9 G/DL (ref 13–17.7)
IMM GRANULOCYTES # BLD AUTO: 0.04 10*3/MM3 (ref 0–0.05)
IMM GRANULOCYTES NFR BLD AUTO: 0.4 % (ref 0–0.5)
LYMPHOCYTES # BLD AUTO: 2.38 10*3/MM3 (ref 0.7–3.1)
LYMPHOCYTES NFR BLD AUTO: 21.8 % (ref 19.6–45.3)
MCH RBC QN AUTO: 29.9 PG (ref 26.6–33)
MCHC RBC AUTO-ENTMCNC: 32.6 G/DL (ref 31.5–35.7)
MCV RBC AUTO: 91.7 FL (ref 79–97)
MONOCYTES # BLD AUTO: 1.27 10*3/MM3 (ref 0.1–0.9)
MONOCYTES NFR BLD AUTO: 11.6 % (ref 5–12)
NEUTROPHILS NFR BLD AUTO: 6.95 10*3/MM3 (ref 1.7–7)
NEUTROPHILS NFR BLD AUTO: 63.6 % (ref 42.7–76)
NRBC BLD AUTO-RTO: 0 /100 WBC (ref 0–0.2)
PLATELET # BLD AUTO: 186 10*3/MM3 (ref 140–450)
PMV BLD AUTO: 11.4 FL (ref 6–12)
POTASSIUM SERPL-SCNC: 3.5 MMOL/L (ref 3.5–5.2)
POTASSIUM SERPL-SCNC: 4.5 MMOL/L (ref 3.5–5.2)
RBC # BLD AUTO: 5.66 10*6/MM3 (ref 4.14–5.8)
SODIUM SERPL-SCNC: 137 MMOL/L (ref 136–145)
WBC # BLD AUTO: 10.92 10*3/MM3 (ref 3.4–10.8)

## 2021-09-11 PROCEDURE — 99233 SBSQ HOSP IP/OBS HIGH 50: CPT | Performed by: INTERNAL MEDICINE

## 2021-09-11 PROCEDURE — 25010000002 FUROSEMIDE PER 20 MG: Performed by: INTERNAL MEDICINE

## 2021-09-11 PROCEDURE — 63710000001 INSULIN LISPRO (HUMAN) PER 5 UNITS: Performed by: HOSPITALIST

## 2021-09-11 PROCEDURE — 85025 COMPLETE CBC W/AUTO DIFF WBC: CPT | Performed by: INTERNAL MEDICINE

## 2021-09-11 PROCEDURE — 80048 BASIC METABOLIC PNL TOTAL CA: CPT | Performed by: HOSPITALIST

## 2021-09-11 PROCEDURE — 84132 ASSAY OF SERUM POTASSIUM: CPT | Performed by: INTERNAL MEDICINE

## 2021-09-11 PROCEDURE — 85730 THROMBOPLASTIN TIME PARTIAL: CPT | Performed by: INTERNAL MEDICINE

## 2021-09-11 PROCEDURE — 82962 GLUCOSE BLOOD TEST: CPT

## 2021-09-11 PROCEDURE — 63710000001 INSULIN GLARGINE PER 5 UNITS: Performed by: HOSPITALIST

## 2021-09-11 RX ORDER — POTASSIUM CHLORIDE 750 MG/1
10 TABLET, FILM COATED, EXTENDED RELEASE ORAL 2 TIMES DAILY WITH MEALS
Status: DISCONTINUED | OUTPATIENT
Start: 2021-09-11 | End: 2021-09-15 | Stop reason: HOSPADM

## 2021-09-11 RX ORDER — FUROSEMIDE 40 MG/1
40 TABLET ORAL
Status: DISCONTINUED | OUTPATIENT
Start: 2021-09-11 | End: 2021-09-12

## 2021-09-11 RX ORDER — POTASSIUM CHLORIDE 750 MG/1
10 TABLET, FILM COATED, EXTENDED RELEASE ORAL DAILY
Status: DISCONTINUED | OUTPATIENT
Start: 2021-09-11 | End: 2021-09-11

## 2021-09-11 RX ORDER — INSULIN LISPRO 100 [IU]/ML
5 INJECTION, SOLUTION INTRAVENOUS; SUBCUTANEOUS
Status: DISCONTINUED | OUTPATIENT
Start: 2021-09-11 | End: 2021-09-12

## 2021-09-11 RX ORDER — INSULIN GLARGINE 100 [IU]/ML
15 INJECTION, SOLUTION SUBCUTANEOUS NIGHTLY
Status: DISCONTINUED | OUTPATIENT
Start: 2021-09-11 | End: 2021-09-12

## 2021-09-11 RX ADMIN — INSULIN LISPRO 2 UNITS: 100 INJECTION, SOLUTION INTRAVENOUS; SUBCUTANEOUS at 06:47

## 2021-09-11 RX ADMIN — INSULIN GLARGINE 15 UNITS: 100 INJECTION, SOLUTION SUBCUTANEOUS at 21:28

## 2021-09-11 RX ADMIN — INSULIN LISPRO 3 UNITS: 100 INJECTION, SOLUTION INTRAVENOUS; SUBCUTANEOUS at 17:24

## 2021-09-11 RX ADMIN — SACUBITRIL AND VALSARTAN 1 TABLET: 24; 26 TABLET, FILM COATED ORAL at 21:28

## 2021-09-11 RX ADMIN — ATORVASTATIN CALCIUM 40 MG: 20 TABLET, FILM COATED ORAL at 21:28

## 2021-09-11 RX ADMIN — INSULIN LISPRO 5 UNITS: 100 INJECTION, SOLUTION INTRAVENOUS; SUBCUTANEOUS at 17:25

## 2021-09-11 RX ADMIN — FUROSEMIDE 40 MG: 40 TABLET ORAL at 17:24

## 2021-09-11 RX ADMIN — CLOTRIMAZOLE AND BETAMETHASONE DIPROPIONATE 1 APPLICATION: 10; .5 CREAM TOPICAL at 21:32

## 2021-09-11 RX ADMIN — FUROSEMIDE 40 MG: 40 TABLET ORAL at 09:43

## 2021-09-11 RX ADMIN — INSULIN LISPRO 3 UNITS: 100 INJECTION, SOLUTION INTRAVENOUS; SUBCUTANEOUS at 08:49

## 2021-09-11 RX ADMIN — SODIUM CHLORIDE, PRESERVATIVE FREE 10 ML: 5 INJECTION INTRAVENOUS at 08:43

## 2021-09-11 RX ADMIN — ACETAMINOPHEN 650 MG: 325 TABLET, FILM COATED ORAL at 05:11

## 2021-09-11 RX ADMIN — CLOPIDOGREL BISULFATE 75 MG: 75 TABLET, FILM COATED ORAL at 08:42

## 2021-09-11 RX ADMIN — PETROLATUM 1 APPLICATION: 420 OINTMENT TOPICAL at 09:43

## 2021-09-11 RX ADMIN — CLOTRIMAZOLE AND BETAMETHASONE DIPROPIONATE 1 APPLICATION: 10; .5 CREAM TOPICAL at 09:43

## 2021-09-11 RX ADMIN — POTASSIUM CHLORIDE 40 MEQ: 750 TABLET, EXTENDED RELEASE ORAL at 06:47

## 2021-09-11 RX ADMIN — FUROSEMIDE 40 MG: 10 INJECTION, SOLUTION INTRAMUSCULAR; INTRAVENOUS at 00:50

## 2021-09-11 RX ADMIN — LISINOPRIL 20 MG: 20 TABLET ORAL at 08:42

## 2021-09-11 RX ADMIN — CETIRIZINE HYDROCHLORIDE 10 MG: 10 TABLET ORAL at 08:42

## 2021-09-11 RX ADMIN — SACUBITRIL AND VALSARTAN 1 TABLET: 24; 26 TABLET, FILM COATED ORAL at 09:43

## 2021-09-11 RX ADMIN — INSULIN LISPRO 3 UNITS: 100 INJECTION, SOLUTION INTRAVENOUS; SUBCUTANEOUS at 11:25

## 2021-09-11 RX ADMIN — INSULIN LISPRO 3 UNITS: 100 INJECTION, SOLUTION INTRAVENOUS; SUBCUTANEOUS at 11:24

## 2021-09-11 RX ADMIN — ASPIRIN 81 MG: 81 TABLET, COATED ORAL at 08:43

## 2021-09-11 RX ADMIN — PANTOPRAZOLE SODIUM 40 MG: 40 TABLET, DELAYED RELEASE ORAL at 05:11

## 2021-09-11 RX ADMIN — METOPROLOL TARTRATE 25 MG: 25 TABLET ORAL at 08:43

## 2021-09-11 RX ADMIN — POTASSIUM CHLORIDE 40 MEQ: 750 TABLET, EXTENDED RELEASE ORAL at 11:25

## 2021-09-11 RX ADMIN — INSULIN LISPRO 3 UNITS: 100 INJECTION, SOLUTION INTRAVENOUS; SUBCUTANEOUS at 06:46

## 2021-09-11 RX ADMIN — METOPROLOL TARTRATE 25 MG: 25 TABLET ORAL at 21:28

## 2021-09-11 NOTE — PROGRESS NOTES
"Daily progress note    Chief complaint  Doing better  No new complaints  Denies any chest pain shortness of breath or palpitation    History of present illness  65-year-old white male with history of diabetes hypertension hyperlipidemia and known coronary artery disease admitted to emergency room with intermittent chest pain for last several weeks with shortness of breath and nausea but no vomiting.  Patient also stated he gets nonradiating pain throbbing type but sometimes get diaphoretic.  I am asked to follow the patient for medical problem.  Patient is chest pain-free at the time of interview.  Patient also denies any fever cough congestion night sweats weight loss or weight gain.  Patient underwent cardiac cath this morning secondary to non-ST elevation MI and had angioplasty and stent placed.  And asked to follow the patient for medical problems specially diabetes.     REVIEW OF SYSTEMS  Unremarkable except shortness of breath    PHYSICAL EXAM   Blood pressure 105/59, pulse 71, temperature 98.2 °F (36.8 °C), temperature source Oral, resp. rate 20, height 170.2 cm (67\"), weight 98.6 kg (217 lb 6.4 oz), SpO2 98 %.    Constitutional: Pt. is oriented to person, place, and time and well-developed, well-nourished, and in no distress.  He is disheveled.  HENT: Normocephalic and atraumatic. Oropharynx moist/nonerythematous.  Neck: Normal range of motion. Neck supple. No JVD present.   Cardiovascular: Normal rate, regular rhythm and normal heart sounds. Exam reveals no gallop and no friction rub.   No murmur heard.  Pulmonary/Chest: Effort normal and breath sounds normal. No stridor. No respiratory distress. No wheezes, no rales.   Abdominal: Soft. Bowel sounds are normal. No distension. There is no tenderness. There is no rebound and no guarding.   Musculoskeletal: Normal range of motion. No edema, tenderness or deformity.   Neurological: Pt. is alert and oriented to person, place, and time.  He has no focal " neurologic deficits  Skin: Skin is warm and dry.  He has an eczematous, flaking rash over his lower extremities that he states has been there for years.  Pt. is not diaphoretic. No erythema.   Psychiatric: Mood, affect and judgment normal.  He is pleasant and cooperative.    LAB RESULTS  Lab Results (last 24 hours)     Procedure Component Value Units Date/Time    POC Glucose Once [[id number]]  (Abnormal) Collected: 09/11/21 1046    Specimen: Blood Updated: 09/11/21 1050     Glucose 200 mg/dL      Comment: Meter: [id number] : [id number] Cammie LADD       POC Glucose Once [[id number]]  (Abnormal) Collected: 09/11/21 0554    Specimen: Blood Updated: 09/11/21 0555     Glucose 181 mg/dL      Comment: Meter: [id number] : [id number] Kane LOPEZ       Basic Metabolic Panel [[id number]]  (Abnormal) Collected: 09/11/21 0348    Specimen: Blood Updated: 09/11/21 0520     Glucose 171 mg/dL      BUN 13 mg/dL      Creatinine 0.62 mg/dL      Sodium 137 mmol/L      Potassium 3.5 mmol/L      Chloride 95 mmol/L      CO2 29.5 mmol/L      Calcium 9.4 mg/dL      eGFR Non African Amer 130 mL/min/1.73      BUN/Creatinine Ratio 21.0     Anion Gap 12.5 mmol/L     Narrative:      GFR Normal >60  Chronic Kidney Disease <60  Kidney Failure <15      aPTT [[id number]]  (Normal) Collected: 09/11/21 0348    Specimen: Blood Updated: 09/11/21 0431     PTT 27.3 seconds     CBC & Differential [[id number]]  (Abnormal) Collected: 09/11/21 0348    Specimen: Blood Updated: 09/11/21 0429    Narrative:      The following orders were created for panel order CBC & Differential.  Procedure                               Abnormality         Status                     ---------                               -----------         ------                     CBC Auto Differential[[id number]]        Abnormal            Final result                 Please view results for these tests on the individual orders.    CBC Auto Differential [[id number]]  (Abnormal)  Collected: 09/11/21 0348    Specimen: Blood Updated: 09/11/21 0429     WBC 10.92 10*3/mm3      RBC 5.66 10*6/mm3      Hemoglobin 16.9 g/dL      Hematocrit 51.9 %      MCV 91.7 fL      MCH 29.9 pg      MCHC 32.6 g/dL      RDW 13.4 %      RDW-SD 45.5 fl      MPV 11.4 fL      Platelets 186 10*3/mm3      Neutrophil % 63.6 %      Lymphocyte % 21.8 %      Monocyte % 11.6 %      Eosinophil % 2.1 %      Basophil % 0.5 %      Immature Grans % 0.4 %      Neutrophils, Absolute 6.95 10*3/mm3      Lymphocytes, Absolute 2.38 10*3/mm3      Monocytes, Absolute 1.27 10*3/mm3      Eosinophils, Absolute 0.23 10*3/mm3      Basophils, Absolute 0.05 10*3/mm3      Immature Grans, Absolute 0.04 10*3/mm3      nRBC 0.0 /100 WBC     POC Glucose Once [[id number]]  (Abnormal) Collected: 09/10/21 2021    Specimen: Blood Updated: 09/10/21 2022     Glucose 173 mg/dL      Comment: Meter: [id number] : [id number] Middleton Bibiana NA       T4, Free [[id number]]  (Normal) Collected: 09/10/21 1646    Specimen: Blood Updated: 09/10/21 1746     Free T4 1.15 ng/dL     Narrative:      Results may be falsely increased if patient taking Biotin.             Study Result    Narrative & Impression   XR CHEST 2 VW-     HISTORY: Male who is 65 years-old,  chest pain     TECHNIQUE: Frontal and lateral views of the chest     COMPARISON: 04/13/2018     FINDINGS: The heart size appears borderline. Pulmonary vasculature is  unremarkable. Mild right and mild to moderate left pleural effusions and  bilateral basilar atelectasis or infiltrate are seen. No pneumothorax.  No acute osseous process.     IMPRESSION:  Mild right and mild to moderate left pleural effusions and  bilateral basilar atelectasis or infiltrate            ECG 12 Lead  Component   Ref Range & Units 9/9/21 0241 9/8/21 2031   QT Interval   ms 394 P  393         HEART RATE= 82  bpm  RR Interval= 732  ms  [place] Interval= 203  ms  P Horizontal Axis= -32  deg  P Front Axis= 53  deg  QRSD Interval= 97  ms  QT  Interval= 394  ms  QRS Axis= -19  deg  T Wave Axis= 158  deg  - ABNORMAL ECG -  Sinus rhythm  Probable left atrial enlargement  Borderline left axis deviation  Consider anterior infarct  Abnormal T, consider ischemia, lateral leads               Current Facility-Administered Medications:   •  acetaminophen (TYLENOL) tablet 650 mg, 650 mg, Oral, Q4H PRN, Zane De La O MD, 650 mg at 09/11/21 0511  •  aspirin EC tablet 81 mg, 81 mg, Oral, Daily, Zane De La O MD, 81 mg at 09/11/21 0843  •  atorvastatin (LIPITOR) tablet 40 mg, 40 mg, Oral, Nightly, Zane De La O MD, 40 mg at 09/10/21 2102  •  cetirizine (zyrTEC) tablet 10 mg, 10 mg, Oral, Daily, Taj Liriano MD, 10 mg at 09/11/21 0842  •  [DISCONTINUED] clopidogrel (PLAVIX) tablet 600 mg, 600 mg, Oral, Once **AND** clopidogrel (PLAVIX) tablet 75 mg, 75 mg, Oral, Daily, Zane De La O MD, 75 mg at 09/11/21 0842  •  clotrimazole-betamethasone (LOTRISONE) 1-0.05 % cream 1 application, 1 application, Topical, Q12H, Taj Liriano MD, 1 application at 09/11/21 0943  •  furosemide (LASIX) tablet 40 mg, 40 mg, Oral, BID, Zane De La O MD, 40 mg at 09/11/21 0943  •  heparin (porcine) 5000 UNIT/ML injection 3,100-4,000 Units, 30-39.2 Units/kg, Intravenous, Q6H PRN, Zane De La O MD, 4,000 Units at 09/09/21 0043  •  [START ON 9/12/2021] influenza vac split quad (FLUZONE,FLUARIX,AFLURIA,FLULAVAL) injection 0.5 mL, 0.5 mL, Intramuscular, Once, Taj Liriano MD  •  insulin glargine (LANTUS, SEMGLEE) injection 10 Units, 10 Units, Subcutaneous, Nightly, Taj Liriano MD, 10 Units at 09/10/21 2102  •  insulin lispro (ADMELOG) injection 0-7 Units, 0-7 Units, Subcutaneous, TID AC, Taj Liriano MD, 3 Units at 09/11/21 1124  •  insulin lispro (ADMELOG) injection 3 Units, 3 Units, Subcutaneous, TID With Meals, Taj Liriano MD, 3 Units at 09/11/21 1125  •  metoprolol tartrate (LOPRESSOR) tablet 25 mg, 25 mg, Oral, Q12H, Zane De La O MD, 25  mg at 09/11/21 0843  •  pantoprazole (PROTONIX) EC tablet 40 mg, 40 mg, Oral, Q AM, Delmi Liriano MD, 40 mg at 09/11/21 0511  •  Petrolatum ointment 1 application, 1 application, Topical, Q24H, Delmi Liriano MD, 1 application at 09/11/21 0943  •  potassium chloride (K-DUR,KLOR-CON) ER tablet 40 mEq, 40 mEq, Oral, PRN, Haleigh Powell MD, 40 mEq at 09/11/21 1125  •  potassium chloride (KLOR-CON) packet 40 mEq, 40 mEq, Oral, PRN, Haleigh Powell MD  •  sacubitril-valsartan (ENTRESTO) 24-26 MG tablet 1 tablet, 1 tablet, Oral, Q12H, Zane De La O MD, 1 tablet at 09/11/21 0943  •  sodium chloride 0.9 % flush 10 mL, 10 mL, Intravenous, PRN, Zane De La O MD, 10 mL at 09/11/21 0843     ASSESSMENT  Acute non-ST elevation MI status post angioplasty with stent placement  Coronary artery disease  Acute systolic congestive heart failure  Diabetes mellitus  Hypertension  Hyperlipidemia  Chronic bilateral lower extremity rash improving  Gastroesophageal reflux disease    PLAN  CPM  Post PCI care  Aspirin Plavix Lopressor Entresto and Lipitor  Diuresis  Routine care for chronic rash  Stress ulcer prophylaxis  Accu-Chek with sliding scale insulin  Supportive care  PT/OT  Discussed with nursing staff  Will follow with Dr.DAS DELMI LIRIANO MD

## 2021-09-12 LAB
ANION GAP SERPL CALCULATED.3IONS-SCNC: 12.5 MMOL/L (ref 5–15)
APTT PPP: 30.4 SECONDS (ref 22.7–35.4)
BASOPHILS # BLD AUTO: 0.06 10*3/MM3 (ref 0–0.2)
BASOPHILS NFR BLD AUTO: 0.5 % (ref 0–1.5)
BUN SERPL-MCNC: 19 MG/DL (ref 8–23)
BUN/CREAT SERPL: 22.6 (ref 7–25)
CALCIUM SPEC-SCNC: 9.1 MG/DL (ref 8.6–10.5)
CHLORIDE SERPL-SCNC: 95 MMOL/L (ref 98–107)
CO2 SERPL-SCNC: 25.5 MMOL/L (ref 22–29)
CREAT SERPL-MCNC: 0.84 MG/DL (ref 0.76–1.27)
DEPRECATED RDW RBC AUTO: 44.6 FL (ref 37–54)
EOSINOPHIL # BLD AUTO: 0.27 10*3/MM3 (ref 0–0.4)
EOSINOPHIL NFR BLD AUTO: 2.3 % (ref 0.3–6.2)
ERYTHROCYTE [DISTWIDTH] IN BLOOD BY AUTOMATED COUNT: 13.1 % (ref 12.3–15.4)
GFR SERPL CREATININE-BSD FRML MDRD: 92 ML/MIN/1.73
GLUCOSE BLDC GLUCOMTR-MCNC: 161 MG/DL (ref 70–130)
GLUCOSE BLDC GLUCOMTR-MCNC: 187 MG/DL (ref 70–130)
GLUCOSE BLDC GLUCOMTR-MCNC: 256 MG/DL (ref 70–130)
GLUCOSE BLDC GLUCOMTR-MCNC: 260 MG/DL (ref 70–130)
GLUCOSE SERPL-MCNC: 274 MG/DL (ref 65–99)
HCT VFR BLD AUTO: 51.7 % (ref 37.5–51)
HGB BLD-MCNC: 16.8 G/DL (ref 13–17.7)
IMM GRANULOCYTES # BLD AUTO: 0.05 10*3/MM3 (ref 0–0.05)
IMM GRANULOCYTES NFR BLD AUTO: 0.4 % (ref 0–0.5)
LYMPHOCYTES # BLD AUTO: 2.37 10*3/MM3 (ref 0.7–3.1)
LYMPHOCYTES NFR BLD AUTO: 20.6 % (ref 19.6–45.3)
MCH RBC QN AUTO: 30.2 PG (ref 26.6–33)
MCHC RBC AUTO-ENTMCNC: 32.5 G/DL (ref 31.5–35.7)
MCV RBC AUTO: 92.8 FL (ref 79–97)
MONOCYTES # BLD AUTO: 1.37 10*3/MM3 (ref 0.1–0.9)
MONOCYTES NFR BLD AUTO: 11.9 % (ref 5–12)
NEUTROPHILS NFR BLD AUTO: 64.3 % (ref 42.7–76)
NEUTROPHILS NFR BLD AUTO: 7.41 10*3/MM3 (ref 1.7–7)
NRBC BLD AUTO-RTO: 0 /100 WBC (ref 0–0.2)
PLATELET # BLD AUTO: 165 10*3/MM3 (ref 140–450)
PMV BLD AUTO: 11.7 FL (ref 6–12)
POTASSIUM SERPL-SCNC: 4.3 MMOL/L (ref 3.5–5.2)
RBC # BLD AUTO: 5.57 10*6/MM3 (ref 4.14–5.8)
SODIUM SERPL-SCNC: 133 MMOL/L (ref 136–145)
WBC # BLD AUTO: 11.53 10*3/MM3 (ref 3.4–10.8)

## 2021-09-12 PROCEDURE — 63710000001 INSULIN GLARGINE PER 5 UNITS: Performed by: HOSPITALIST

## 2021-09-12 PROCEDURE — 63710000001 INSULIN LISPRO (HUMAN) PER 5 UNITS: Performed by: HOSPITALIST

## 2021-09-12 PROCEDURE — 82962 GLUCOSE BLOOD TEST: CPT

## 2021-09-12 PROCEDURE — 80048 BASIC METABOLIC PNL TOTAL CA: CPT | Performed by: HOSPITALIST

## 2021-09-12 PROCEDURE — 99233 SBSQ HOSP IP/OBS HIGH 50: CPT | Performed by: INTERNAL MEDICINE

## 2021-09-12 PROCEDURE — 85730 THROMBOPLASTIN TIME PARTIAL: CPT | Performed by: INTERNAL MEDICINE

## 2021-09-12 PROCEDURE — 85025 COMPLETE CBC W/AUTO DIFF WBC: CPT | Performed by: INTERNAL MEDICINE

## 2021-09-12 RX ORDER — INSULIN GLARGINE 100 [IU]/ML
20 INJECTION, SOLUTION SUBCUTANEOUS NIGHTLY
Status: DISCONTINUED | OUTPATIENT
Start: 2021-09-12 | End: 2021-09-13

## 2021-09-12 RX ORDER — INSULIN LISPRO 100 [IU]/ML
7 INJECTION, SOLUTION INTRAVENOUS; SUBCUTANEOUS
Status: DISCONTINUED | OUTPATIENT
Start: 2021-09-12 | End: 2021-09-13

## 2021-09-12 RX ADMIN — SACUBITRIL AND VALSARTAN 1 TABLET: 24; 26 TABLET, FILM COATED ORAL at 21:04

## 2021-09-12 RX ADMIN — SODIUM CHLORIDE, PRESERVATIVE FREE 10 ML: 5 INJECTION INTRAVENOUS at 21:05

## 2021-09-12 RX ADMIN — ASPIRIN 81 MG: 81 TABLET, COATED ORAL at 08:56

## 2021-09-12 RX ADMIN — CETIRIZINE HYDROCHLORIDE 10 MG: 10 TABLET ORAL at 08:56

## 2021-09-12 RX ADMIN — ATORVASTATIN CALCIUM 40 MG: 20 TABLET, FILM COATED ORAL at 21:04

## 2021-09-12 RX ADMIN — METOPROLOL TARTRATE 25 MG: 25 TABLET ORAL at 21:04

## 2021-09-12 RX ADMIN — FUROSEMIDE 40 MG: 40 TABLET ORAL at 08:56

## 2021-09-12 RX ADMIN — SACUBITRIL AND VALSARTAN 1 TABLET: 24; 26 TABLET, FILM COATED ORAL at 08:56

## 2021-09-12 RX ADMIN — INSULIN LISPRO 5 UNITS: 100 INJECTION, SOLUTION INTRAVENOUS; SUBCUTANEOUS at 06:47

## 2021-09-12 RX ADMIN — INSULIN LISPRO 2 UNITS: 100 INJECTION, SOLUTION INTRAVENOUS; SUBCUTANEOUS at 13:36

## 2021-09-12 RX ADMIN — INSULIN LISPRO 7 UNITS: 100 INJECTION, SOLUTION INTRAVENOUS; SUBCUTANEOUS at 16:44

## 2021-09-12 RX ADMIN — CLOTRIMAZOLE AND BETAMETHASONE DIPROPIONATE 1 APPLICATION: 10; .5 CREAM TOPICAL at 08:57

## 2021-09-12 RX ADMIN — FUROSEMIDE 60 MG: 20 TABLET ORAL at 16:44

## 2021-09-12 RX ADMIN — ACETAMINOPHEN 650 MG: 325 TABLET, FILM COATED ORAL at 03:10

## 2021-09-12 RX ADMIN — METOPROLOL TARTRATE 25 MG: 25 TABLET ORAL at 08:56

## 2021-09-12 RX ADMIN — INSULIN GLARGINE 20 UNITS: 100 INJECTION, SOLUTION SUBCUTANEOUS at 21:05

## 2021-09-12 RX ADMIN — POTASSIUM CHLORIDE 10 MEQ: 750 TABLET, EXTENDED RELEASE ORAL at 08:56

## 2021-09-12 RX ADMIN — INSULIN LISPRO 5 UNITS: 100 INJECTION, SOLUTION INTRAVENOUS; SUBCUTANEOUS at 13:36

## 2021-09-12 RX ADMIN — INSULIN LISPRO 4 UNITS: 100 INJECTION, SOLUTION INTRAVENOUS; SUBCUTANEOUS at 16:45

## 2021-09-12 RX ADMIN — PANTOPRAZOLE SODIUM 40 MG: 40 TABLET, DELAYED RELEASE ORAL at 05:56

## 2021-09-12 RX ADMIN — PETROLATUM 1 APPLICATION: 420 OINTMENT TOPICAL at 08:57

## 2021-09-12 RX ADMIN — CLOTRIMAZOLE AND BETAMETHASONE DIPROPIONATE 1 APPLICATION: 10; .5 CREAM TOPICAL at 21:05

## 2021-09-12 RX ADMIN — INSULIN LISPRO 4 UNITS: 100 INJECTION, SOLUTION INTRAVENOUS; SUBCUTANEOUS at 06:46

## 2021-09-12 RX ADMIN — CLOPIDOGREL BISULFATE 75 MG: 75 TABLET, FILM COATED ORAL at 08:56

## 2021-09-12 RX ADMIN — POTASSIUM CHLORIDE 10 MEQ: 750 TABLET, EXTENDED RELEASE ORAL at 16:44

## 2021-09-12 NOTE — PROGRESS NOTES
"Daily progress note    Chief complaint  Doing same  No new complaints  Denies any chest pain shortness of breath or palpitation    History of present illness  65-year-old white male with history of diabetes hypertension hyperlipidemia and known coronary artery disease admitted to emergency room with intermittent chest pain for last several weeks with shortness of breath and nausea but no vomiting.  Patient also stated he gets nonradiating pain throbbing type but sometimes get diaphoretic.  I am asked to follow the patient for medical problem.  Patient is chest pain-free at the time of interview.  Patient also denies any fever cough congestion night sweats weight loss or weight gain.  Patient underwent cardiac cath this morning secondary to non-ST elevation MI and had angioplasty and stent placed.  And asked to follow the patient for medical problems specially diabetes.     REVIEW OF SYSTEMS  Unremarkable except generalized weakness    PHYSICAL EXAM   Blood pressure 135/65, pulse 78, temperature 98.7 °F (37.1 °C), temperature source Oral, resp. rate 16, height 170.2 cm (67\"), weight 98.6 kg (217 lb 6.4 oz), SpO2 96 %.    Constitutional: Pt. is oriented to person, place, and time and well-developed, well-nourished, and in no distress.  He is disheveled.  HENT: Normocephalic and atraumatic. Oropharynx moist/nonerythematous.  Neck: Normal range of motion. Neck supple. No JVD present.   Cardiovascular: Normal rate, regular rhythm and normal heart sounds. Exam reveals no gallop and no friction rub.   No murmur heard.  Pulmonary/Chest: Effort normal and breath sounds normal. No stridor. No respiratory distress. No wheezes, no rales.   Abdominal: Soft. Bowel sounds are normal. No distension. There is no tenderness. There is no rebound and no guarding.   Musculoskeletal: Normal range of motion. No edema, tenderness or deformity.   Neurological: Pt. is alert and oriented to person, place, and time.  He has no focal neurologic " deficits  Skin: Skin is warm and dry.  He has an eczematous, flaking rash over his lower extremities that he states has been there for years.  Pt. is not diaphoretic. No erythema.   Psychiatric: Mood, affect and judgment normal.  He is pleasant and cooperative.    LAB RESULTS  Lab Results (last 24 hours)     Procedure Component Value Units Date/Time    POC Glucose Once [[id number]]  (Abnormal) Collected: 09/12/21 1127    Specimen: Blood Updated: 09/12/21 1145     Glucose 187 mg/dL      Comment: Meter: [id number] : [id number] Gail FUENTESA       POC Glucose Once [[id number]]  (Abnormal) Collected: 09/12/21 0539    Specimen: Blood Updated: 09/12/21 0541     Glucose 260 mg/dL      Comment: Meter: [id number] : [id number] Suepatricia Miller JOHN       aPTT [[id number]]  (Normal) Collected: 09/12/21 0406    Specimen: Blood Updated: 09/12/21 0537     PTT 30.4 seconds     Basic Metabolic Panel [[id number]]  (Abnormal) Collected: 09/12/21 0406    Specimen: Blood Updated: 09/12/21 0532     Glucose 274 mg/dL      BUN 19 mg/dL      Creatinine 0.84 mg/dL      Sodium 133 mmol/L      Potassium 4.3 mmol/L      Comment: Slight hemolysis detected by analyzer. Results may be affected.        Chloride 95 mmol/L      CO2 25.5 mmol/L      Calcium 9.1 mg/dL      eGFR Non African Amer 92 mL/min/1.73      BUN/Creatinine Ratio 22.6     Anion Gap 12.5 mmol/L     Narrative:      GFR Normal >60  Chronic Kidney Disease <60  Kidney Failure <15      CBC & Differential [[id number]]  (Abnormal) Collected: 09/12/21 0406    Specimen: Blood Updated: 09/12/21 0514    Narrative:      The following orders were created for panel order CBC & Differential.  Procedure                               Abnormality         Status                     ---------                               -----------         ------                     CBC Auto Differential[[id number]]        Abnormal            Final result                 Please view results for these tests on the  individual orders.    CBC Auto Differential [[id number]]  (Abnormal) Collected: 09/12/21 0406    Specimen: Blood Updated: 09/12/21 0514     WBC 11.53 10*3/mm3      RBC 5.57 10*6/mm3      Hemoglobin 16.8 g/dL      Hematocrit 51.7 %      MCV 92.8 fL      MCH 30.2 pg      MCHC 32.5 g/dL      RDW 13.1 %      RDW-SD 44.6 fl      MPV 11.7 fL      Platelets 165 10*3/mm3      Neutrophil % 64.3 %      Lymphocyte % 20.6 %      Monocyte % 11.9 %      Eosinophil % 2.3 %      Basophil % 0.5 %      Immature Grans % 0.4 %      Neutrophils, Absolute 7.41 10*3/mm3      Lymphocytes, Absolute 2.37 10*3/mm3      Monocytes, Absolute 1.37 10*3/mm3      Eosinophils, Absolute 0.27 10*3/mm3      Basophils, Absolute 0.06 10*3/mm3      Immature Grans, Absolute 0.05 10*3/mm3      nRBC 0.0 /100 WBC     POC Glucose Once [[id number]]  (Abnormal) Collected: 09/11/21 2023    Specimen: Blood Updated: 09/11/21 2025     Glucose 251 mg/dL      Comment: Meter: [id number] : [id number] Sue LOPEZ              Study Result    Narrative & Impression   XR CHEST 2 VW-     HISTORY: Male who is 65 years-old,  chest pain     TECHNIQUE: Frontal and lateral views of the chest     COMPARISON: 04/13/2018     FINDINGS: The heart size appears borderline. Pulmonary vasculature is  unremarkable. Mild right and mild to moderate left pleural effusions and  bilateral basilar atelectasis or infiltrate are seen. No pneumothorax.  No acute osseous process.     IMPRESSION:  Mild right and mild to moderate left pleural effusions and  bilateral basilar atelectasis or infiltrate            ECG 12 Lead  Component   Ref Range & Units 9/9/21 0241 9/8/21 2031   QT Interval   ms 394 P  393         HEART RATE= 82  bpm  RR Interval= 732  ms  MA Interval= 203  ms  P Horizontal Axis= -32  deg  P Front Axis= 53  deg  QRSD Interval= 97  ms  QT Interval= 394  ms  QRS Axis= -19  deg  T Wave Axis= 158  deg  - ABNORMAL ECG -  Sinus rhythm  Probable left atrial enlargement  Borderline  left axis deviation  Consider anterior infarct  Abnormal T, consider ischemia, lateral leads               Current Facility-Administered Medications:   •  acetaminophen (TYLENOL) tablet 650 mg, 650 mg, Oral, Q4H PRN, Zane De La O MD, 650 mg at 09/12/21 0310  •  aspirin EC tablet 81 mg, 81 mg, Oral, Daily, Zane De La O MD, 81 mg at 09/12/21 0856  •  atorvastatin (LIPITOR) tablet 40 mg, 40 mg, Oral, Nightly, Zane De La O MD, 40 mg at 09/11/21 2128  •  cetirizine (zyrTEC) tablet 10 mg, 10 mg, Oral, Daily, Taj Liriano MD, 10 mg at 09/12/21 0856  •  [DISCONTINUED] clopidogrel (PLAVIX) tablet 600 mg, 600 mg, Oral, Once **AND** clopidogrel (PLAVIX) tablet 75 mg, 75 mg, Oral, Daily, Zane De La O MD, 75 mg at 09/12/21 0856  •  clotrimazole-betamethasone (LOTRISONE) 1-0.05 % cream 1 application, 1 application, Topical, Q12H, Taj Liriano MD, 1 application at 09/12/21 0857  •  furosemide (LASIX) tablet 60 mg, 60 mg, Oral, BID, Zane De La O MD  •  heparin (porcine) 5000 UNIT/ML injection 3,100-4,000 Units, 30-39.2 Units/kg, Intravenous, Q6H PRN, Zane De aL O MD, 4,000 Units at 09/09/21 0043  •  influenza vac split quad (FLUZONE,FLUARIX,AFLURIA,FLULAVAL) injection 0.5 mL, 0.5 mL, Intramuscular, Once, Taj Liriano MD  •  insulin glargine (LANTUS, SEMGLEE) injection 15 Units, 15 Units, Subcutaneous, Nightly, Taj Liriano MD, 15 Units at 09/11/21 2128  •  insulin lispro (ADMELOG) injection 0-7 Units, 0-7 Units, Subcutaneous, TID AC, Taj Liriano MD, 2 Units at 09/12/21 1336  •  insulin lispro (ADMELOG) injection 5 Units, 5 Units, Subcutaneous, TID With Meals, Taj Liriano MD, 5 Units at 09/12/21 1336  •  metoprolol tartrate (LOPRESSOR) tablet 25 mg, 25 mg, Oral, Q12H, Zane De La O MD, 25 mg at 09/12/21 0856  •  pantoprazole (PROTONIX) EC tablet 40 mg, 40 mg, Oral, Q AM, Taj Liriano MD, 40 mg at 09/12/21 0556  •  Petrolatum ointment 1 application, 1 application,  Topical, Q24H, Delmi Liriano MD, 1 application at 09/12/21 0857  •  potassium chloride (K-DUR,KLOR-CON) ER tablet 10 mEq, 10 mEq, Oral, BID With Meals, Delmi Liriano MD, 10 mEq at 09/12/21 0856  •  potassium chloride (K-DUR,KLOR-CON) ER tablet 40 mEq, 40 mEq, Oral, PRN, Haleigh Powell MD, 40 mEq at 09/11/21 1125  •  potassium chloride (KLOR-CON) packet 40 mEq, 40 mEq, Oral, PRN, Haleigh Powell MD  •  sacubitril-valsartan (ENTRESTO) 24-26 MG tablet 1 tablet, 1 tablet, Oral, Q12H, Zane De La O MD, 1 tablet at 09/12/21 0856  •  sodium chloride 0.9 % flush 10 mL, 10 mL, Intravenous, PRN, Zane De La O MD, 10 mL at 09/11/21 0843     ASSESSMENT  Acute non-ST elevation MI status post angioplasty with stent placement  Coronary artery disease  Acute systolic congestive heart failure  Diabetes mellitus  Hypertension  Hyperlipidemia  Chronic bilateral lower extremity rash improving  Gastroesophageal reflux disease    PLAN  CPM  Post PCI care  Aspirin Plavix Lopressor Entresto and Lipitor  Diuresis  Continue Lotrisone  Stress ulcer prophylaxis  Accu-Chek with sliding scale insulin  Supportive care  PT/OT  Discussed with nursing staff  DSischarge planning    DELMI LIRIANO MD

## 2021-09-12 NOTE — PROGRESS NOTES
HOSPITAL FOLLOW UP NOTE    Patient Name: Matt Cruz  Patient : 1955        Date of Service:21  Provider of Service: Zane De La O MD  Place of Service: [facility]  Referral Provider: Nikki Snider MD          Follow Up: Coronary artery disease, congestive heart failure, skin rash  Interval Hx: Overall the patient feeling about the same.  He is not complaining of any angina.  Feels rash may be resolving.  Switch him from lisinopril to Entresto.  Only minimal diuresis yesterday.        OBJECTIVE  Temp:  [98.1 °F (36.7 °C)-98.7 °F (37.1 °C)] 98.7 °F (37.1 °C)  Heart Rate:  [71-84] 84  Resp:  [16-20] 16  BP: (105-137)/(59-78) 129/60     Intake/Output Summary (Last 24 hours) at 2021 0955  Last data filed at 2021 0900  Gross per 24 hour   Intake 620 ml   Output 1025 ml   Net -405 ml     Body mass index is 34.05 kg/m².      21  1655 21   Weight: 102 kg (225 lb) 98.6 kg (217 lb 6.4 oz)         Physical Exam:   Vitals reviewed.   Constitutional:       Appearance: Healthy appearance. Well-developed, overweight and not in distress.   Neck:      Thyroid: No thyromegaly.      Vascular: No carotid bruit or JVD.   Pulmonary:      Effort: Pulmonary effort is normal.      Breath sounds: Normal breath sounds.   Cardiovascular:      Normal rate. Regular rhythm. Normal S1. Normal S2.      Murmurs: There is no murmur.      No gallop.   Pulses:     Intact distal pulses.   Edema:     Peripheral edema present.     Ankle: bilateral 1+ edema of the ankle.     Feet: bilateral 1+ edema of the feet.  Musculoskeletal:      Cervical back: Normal range of motion. Skin:     General: Skin is warm and dry.      Findings: Rash present. No erythema.   Neurological:      Mental Status: Alert and oriented to person, place, and time.           CURRENT MEDS    Scheduled Meds:aspirin, 81 mg, Oral, Daily  atorvastatin, 40 mg, Oral, Nightly  cetirizine, 10 mg, Oral, Daily  clopidogrel, 75  mg, Oral, Daily  clotrimazole-betamethasone, 1 application, Topical, Q12H  furosemide, 40 mg, Oral, BID  influenza vaccine, 0.5 mL, Intramuscular, Once  insulin glargine, 15 Units, Subcutaneous, Nightly  insulin lispro, 0-7 Units, Subcutaneous, TID AC  insulin lispro, 5 Units, Subcutaneous, TID With Meals  metoprolol tartrate, 25 mg, Oral, Q12H  pantoprazole, 40 mg, Oral, Q AM  Petrolatum, 1 application, Topical, Q24H  potassium chloride, 10 mEq, Oral, BID With Meals  sacubitril-valsartan, 1 tablet, Oral, Q12H      Continuous Infusions:       Lab Review:   Results from last 7 days   Lab Units 09/12/21  0406 09/11/21  1513 09/11/21  0348 09/11/21  0348 09/10/21  1646 09/10/21  0440 09/08/21  1532 09/08/21  1532   SODIUM mmol/L 133*  --   --  137  --  143   < > 140   POTASSIUM mmol/L 4.3 4.5   < > 3.5   < > 3.4*   < > 3.6   CHLORIDE mmol/L 95*  --   --  95*  --  102   < > 103   CO2 mmol/L 25.5  --   --  29.5*  --  28.2   < > 25.7   BUN mg/dL 19  --   --  13  --  11   < > 13   CREATININE mg/dL 0.84  --   --  0.62*  --  0.66*   < > 0.65*   GLUCOSE mg/dL 274*  --   --  171*  --  164*   < > 231*   CALCIUM mg/dL 9.1  --   --  9.4  --  9.0   < > 9.1   AST (SGOT) U/L  --   --   --   --   --  15  --  22   ALT (SGPT) U/L  --   --   --   --   --  25  --  37    < > = values in this interval not displayed.         Results from last 7 days   Lab Units 09/12/21  0406 09/11/21  0348   WBC 10*3/mm3 11.53* 10.92*   HEMOGLOBIN g/dL 16.8 16.9   HEMATOCRIT % 51.7* 51.9*   PLATELETS 10*3/mm3 165 186     Results from last 7 days   Lab Units 09/12/21  0406 09/11/21  0348 09/08/21  2328 09/08/21  1532   INR   --   --   --  1.00   APTT seconds 30.4 27.3   < > 27.8    < > = values in this interval not displayed.         Results from last 7 days   Lab Units 09/10/21  0440   CHOLESTEROL mg/dL 143   TRIGLYCERIDES mg/dL 107   HDL CHOL mg/dL 39*   LDL CHOL mg/dL 84     Results from last 7 days   Lab Units 09/10/21  0440   PROBNP pg/mL 821.0      Results from last 7 days   Lab Units 09/10/21  0440   TSH uIU/mL 0.869       I personally reviewed the patient's ECG and telemetry data    ASSESSMENT & PLAN    Coronary artery disease    Chest pain with high risk of acute coronary syndrome    1.  Non-ST RONEY: Recent intervention LCx.,  Chronically occluded RCA.  Diffuse LAD disease  2.  Congestive heart failure: Acute on chronic systolic.  Class II-III NYHA symptoms.  Increase diuretic therapy.  3.  Skin rash: May be secondary to lisinopril.  Will change over to Entresto  4.  Diabetes mellitus, type II: Inadequately controlled.  4.  Hyperlipidemia: On statin therapy           Zane De La O MD  09/12/21

## 2021-09-13 LAB
ANION GAP SERPL CALCULATED.3IONS-SCNC: 13 MMOL/L (ref 5–15)
APTT PPP: 29.6 SECONDS (ref 22.7–35.4)
BASOPHILS # BLD AUTO: 0.07 10*3/MM3 (ref 0–0.2)
BASOPHILS NFR BLD AUTO: 0.6 % (ref 0–1.5)
BUN SERPL-MCNC: 18 MG/DL (ref 8–23)
BUN/CREAT SERPL: 25.7 (ref 7–25)
CALCIUM SPEC-SCNC: 9.7 MG/DL (ref 8.6–10.5)
CHLORIDE SERPL-SCNC: 94 MMOL/L (ref 98–107)
CO2 SERPL-SCNC: 30 MMOL/L (ref 22–29)
CREAT SERPL-MCNC: 0.7 MG/DL (ref 0.76–1.27)
DEPRECATED RDW RBC AUTO: 41.6 FL (ref 37–54)
EOSINOPHIL # BLD AUTO: 0.32 10*3/MM3 (ref 0–0.4)
EOSINOPHIL NFR BLD AUTO: 2.9 % (ref 0.3–6.2)
ERYTHROCYTE [DISTWIDTH] IN BLOOD BY AUTOMATED COUNT: 13 % (ref 12.3–15.4)
GFR SERPL CREATININE-BSD FRML MDRD: 113 ML/MIN/1.73
GLUCOSE BLDC GLUCOMTR-MCNC: 156 MG/DL (ref 70–130)
GLUCOSE BLDC GLUCOMTR-MCNC: 176 MG/DL (ref 70–130)
GLUCOSE BLDC GLUCOMTR-MCNC: 179 MG/DL (ref 70–130)
GLUCOSE BLDC GLUCOMTR-MCNC: 210 MG/DL (ref 70–130)
GLUCOSE SERPL-MCNC: 169 MG/DL (ref 65–99)
HCT VFR BLD AUTO: 53.8 % (ref 37.5–51)
HGB BLD-MCNC: 18.2 G/DL (ref 13–17.7)
IMM GRANULOCYTES # BLD AUTO: 0.06 10*3/MM3 (ref 0–0.05)
IMM GRANULOCYTES NFR BLD AUTO: 0.5 % (ref 0–0.5)
LYMPHOCYTES # BLD AUTO: 2.27 10*3/MM3 (ref 0.7–3.1)
LYMPHOCYTES NFR BLD AUTO: 20.3 % (ref 19.6–45.3)
MCH RBC QN AUTO: 29.8 PG (ref 26.6–33)
MCHC RBC AUTO-ENTMCNC: 33.8 G/DL (ref 31.5–35.7)
MCV RBC AUTO: 88.2 FL (ref 79–97)
MONOCYTES # BLD AUTO: 1.31 10*3/MM3 (ref 0.1–0.9)
MONOCYTES NFR BLD AUTO: 11.7 % (ref 5–12)
NEUTROPHILS NFR BLD AUTO: 64 % (ref 42.7–76)
NEUTROPHILS NFR BLD AUTO: 7.15 10*3/MM3 (ref 1.7–7)
NRBC BLD AUTO-RTO: 0 /100 WBC (ref 0–0.2)
PLATELET # BLD AUTO: 182 10*3/MM3 (ref 140–450)
PMV BLD AUTO: 11.5 FL (ref 6–12)
POTASSIUM SERPL-SCNC: 4.2 MMOL/L (ref 3.5–5.2)
RBC # BLD AUTO: 6.1 10*6/MM3 (ref 4.14–5.8)
SODIUM SERPL-SCNC: 137 MMOL/L (ref 136–145)
WBC # BLD AUTO: 11.18 10*3/MM3 (ref 3.4–10.8)

## 2021-09-13 PROCEDURE — 85730 THROMBOPLASTIN TIME PARTIAL: CPT | Performed by: INTERNAL MEDICINE

## 2021-09-13 PROCEDURE — 85025 COMPLETE CBC W/AUTO DIFF WBC: CPT | Performed by: INTERNAL MEDICINE

## 2021-09-13 PROCEDURE — 63710000001 INSULIN LISPRO (HUMAN) PER 5 UNITS: Performed by: HOSPITALIST

## 2021-09-13 PROCEDURE — 63710000001 INSULIN GLARGINE PER 5 UNITS: Performed by: HOSPITALIST

## 2021-09-13 PROCEDURE — 99232 SBSQ HOSP IP/OBS MODERATE 35: CPT | Performed by: INTERNAL MEDICINE

## 2021-09-13 PROCEDURE — 80048 BASIC METABOLIC PNL TOTAL CA: CPT | Performed by: HOSPITALIST

## 2021-09-13 PROCEDURE — 97161 PT EVAL LOW COMPLEX 20 MIN: CPT

## 2021-09-13 PROCEDURE — 97110 THERAPEUTIC EXERCISES: CPT

## 2021-09-13 PROCEDURE — 82962 GLUCOSE BLOOD TEST: CPT

## 2021-09-13 RX ORDER — INSULIN LISPRO 100 [IU]/ML
8 INJECTION, SOLUTION INTRAVENOUS; SUBCUTANEOUS
Status: DISCONTINUED | OUTPATIENT
Start: 2021-09-13 | End: 2021-09-14

## 2021-09-13 RX ORDER — INSULIN GLARGINE 100 [IU]/ML
25 INJECTION, SOLUTION SUBCUTANEOUS NIGHTLY
Status: DISCONTINUED | OUTPATIENT
Start: 2021-09-13 | End: 2021-09-14

## 2021-09-13 RX ADMIN — FUROSEMIDE 60 MG: 20 TABLET ORAL at 08:55

## 2021-09-13 RX ADMIN — ASPIRIN 81 MG: 81 TABLET, COATED ORAL at 08:55

## 2021-09-13 RX ADMIN — INSULIN GLARGINE 25 UNITS: 100 INJECTION, SOLUTION SUBCUTANEOUS at 22:54

## 2021-09-13 RX ADMIN — POTASSIUM CHLORIDE 10 MEQ: 750 TABLET, EXTENDED RELEASE ORAL at 16:53

## 2021-09-13 RX ADMIN — CLOPIDOGREL BISULFATE 75 MG: 75 TABLET, FILM COATED ORAL at 08:55

## 2021-09-13 RX ADMIN — SACUBITRIL AND VALSARTAN 1 TABLET: 24; 26 TABLET, FILM COATED ORAL at 08:55

## 2021-09-13 RX ADMIN — SACUBITRIL AND VALSARTAN 1 TABLET: 24; 26 TABLET, FILM COATED ORAL at 22:55

## 2021-09-13 RX ADMIN — INSULIN LISPRO 2 UNITS: 100 INJECTION, SOLUTION INTRAVENOUS; SUBCUTANEOUS at 07:30

## 2021-09-13 RX ADMIN — CLOTRIMAZOLE AND BETAMETHASONE DIPROPIONATE 1 APPLICATION: 10; .5 CREAM TOPICAL at 08:55

## 2021-09-13 RX ADMIN — INSULIN LISPRO 8 UNITS: 100 INJECTION, SOLUTION INTRAVENOUS; SUBCUTANEOUS at 16:54

## 2021-09-13 RX ADMIN — POTASSIUM CHLORIDE 10 MEQ: 750 TABLET, EXTENDED RELEASE ORAL at 08:55

## 2021-09-13 RX ADMIN — METOPROLOL TARTRATE 25 MG: 25 TABLET ORAL at 22:57

## 2021-09-13 RX ADMIN — CLOTRIMAZOLE AND BETAMETHASONE DIPROPIONATE 1 APPLICATION: 10; .5 CREAM TOPICAL at 22:58

## 2021-09-13 RX ADMIN — CETIRIZINE HYDROCHLORIDE 10 MG: 10 TABLET ORAL at 08:55

## 2021-09-13 RX ADMIN — FUROSEMIDE 60 MG: 20 TABLET ORAL at 16:53

## 2021-09-13 RX ADMIN — INSULIN LISPRO 7 UNITS: 100 INJECTION, SOLUTION INTRAVENOUS; SUBCUTANEOUS at 11:32

## 2021-09-13 RX ADMIN — PETROLATUM 1 APPLICATION: 420 OINTMENT TOPICAL at 22:55

## 2021-09-13 RX ADMIN — INSULIN LISPRO 7 UNITS: 100 INJECTION, SOLUTION INTRAVENOUS; SUBCUTANEOUS at 07:30

## 2021-09-13 RX ADMIN — ATORVASTATIN CALCIUM 40 MG: 20 TABLET, FILM COATED ORAL at 22:55

## 2021-09-13 RX ADMIN — INSULIN LISPRO 2 UNITS: 100 INJECTION, SOLUTION INTRAVENOUS; SUBCUTANEOUS at 11:32

## 2021-09-13 RX ADMIN — INSULIN LISPRO 3 UNITS: 100 INJECTION, SOLUTION INTRAVENOUS; SUBCUTANEOUS at 16:54

## 2021-09-13 RX ADMIN — PETROLATUM 1 APPLICATION: 420 OINTMENT TOPICAL at 08:55

## 2021-09-13 RX ADMIN — PANTOPRAZOLE SODIUM 40 MG: 40 TABLET, DELAYED RELEASE ORAL at 06:09

## 2021-09-13 RX ADMIN — ACETAMINOPHEN 650 MG: 325 TABLET, FILM COATED ORAL at 02:55

## 2021-09-13 RX ADMIN — METOPROLOL TARTRATE 25 MG: 25 TABLET ORAL at 08:55

## 2021-09-13 NOTE — THERAPY EVALUATION
Patient Name: Matt Cruz  : 1955    MRN: [id number]                              Today's Date: 2021       Admit Date: 2021    Visit Dx:     ICD-10-CM ICD-9-CM   1. Chest pain with high risk of acute coronary syndrome  R07.9 786.50   2. Coronary artery disease involving native heart without angina pectoris, unspecified vessel or lesion type  I25.10 414.01   3. NSTEMI, initial episode of care (CMS/Formerly [facility])  I21.4 410.71   4. S/P drug eluting coronary stent placement  Z95.5 V45.82     Patient Active Problem List   Diagnosis   • Exertional chest pain   • Essential hypertension   • TIA (transient ischemic attack)   • Type 2 diabetes mellitus with other specified complication (CMS/Formerly [facility])   • HLD (hyperlipidemia)   • Carotid stenosis   • Coronary artery disease   • Chest pain with high risk of acute coronary syndrome     Past Medical History:   Diagnosis Date   • CAD (coronary artery disease)    • Carotid stenosis    • Coronary artery disease    • Elevated cholesterol    • Essential hypertension    • Exertional chest pain    • Hyperlipidemia    • Hypertension    • Kidney stone    • TIA (transient ischemic attack)    • Type 2 diabetes mellitus with other specified complication (CMS/Formerly [facility])      Past Surgical History:   Procedure Laterality Date   • CARDIAC CATHETERIZATION Left 2018    Procedure: Cardiac Catheterization/Vascular Study;  Surgeon: Zane De La O MD;  Location: [facility] CATH INVASIVE LOCATION;  Service: Cardiovascular   • CARDIAC CATHETERIZATION N/A 2018    Procedure: Stent KD coronary;  Surgeon: Zane De La O MD;  Location: [facility] CATH INVASIVE LOCATION;  Service: Cardiovascular   • CARDIAC CATHETERIZATION N/A 2021    Procedure: Left Heart Cath;  Surgeon: Zane De La O MD;  Location: [facility] CATH INVASIVE LOCATION;  Service: Cardiology;  Laterality: N/A;   • CARDIAC CATHETERIZATION N/A 2021    Procedure: Coronary angiography;  Surgeon: Zane De La O MD;   "Location:  [facility] CATH INVASIVE LOCATION;  Service: Cardiology;  Laterality: N/A;   • CARDIAC CATHETERIZATION N/A 9/9/2021    Procedure: Left ventriculography;  Surgeon: Zane DeL a O MD;  Location:  [facility] CATH INVASIVE LOCATION;  Service: Cardiology;  Laterality: N/A;   • CARDIAC CATHETERIZATION N/A 9/9/2021    Procedure: Stent KD coronary;  Surgeon: Zane De La O MD;  Location[facility]VASIVE LOC[facility];  Service: Cardiology;  Laterality: N/A;   • CARDIAC CATHETERIZATION N/A 9/9/2021    Procedure: Percutaneous Coronary Intervention;  Surgeon: Zane De La O MD;  Location:  [facility] CATH INVASIVE LOCATION;  Service: Cardiology;  Laterality: N/A;   • CORONARY ANGIOPLASTY WITH STENT PLACEMENT     • KIDNEY STONE SURGERY       General Information     Row Name 09/13/21 1527          Physical Therapy Time and Intention    Document Type  evaluation  -CF     Mode of Treatment  individual therapy;physical therapy  -     Row Name 09/13/21 1527          General Information    Patient Profile Reviewed  yes  -CF     Prior Level of Function  independent: Reports he is independent but said \"if you asked my family, they would report otherwise and say I need their assistance.\" Denies use of assistive device, denies falls but does report he is generally unsteady  -CF     Existing Precautions/Restrictions  fall  -CF     Barriers to Rehab  none identified  -     Row Name 09/13/21 1527          Living Environment    Lives With  spouse  -     Row Name 09/13/21 1527          Home Main Entrance    Number of Stairs, Main Entrance  four  -CF     Stair Railings, Main Entrance  railing on right side (ascending)  -     Row Name 09/13/21 1527          Stairs Within Home, Primary    Number of Stairs, Within Home, Primary  none  -CF     Row Name 09/13/21 1527          Cognition    Orientation Status (Cognition)  oriented x 4  -     Row Name 09/13/21 1527          Safety Issues, Functional Mobility    Safety Issues " Affecting Function (Mobility)  insight into deficits/self-awareness  -CF     Impairments Affecting Function (Mobility)  balance;endurance/activity tolerance;strength  -CF       User Key  (r) = Recorded By, (t) = Taken By, (c) = Cosigned By    Initials Name Provider Type    CF Radha Gaffney, PT Physical Therapist        Mobility     Row Name 09/13/21 1530          Bed Mobility    Comment (Bed Mobility)  NT up in chair at arrival and departure  -CF     Row Name 09/13/21 1530          Sit-Stand Transfer    Sit-Stand Craig (Transfers)  standby assist  -CF     Assistive Device (Sit-Stand Transfers)  -- no AD  -CF     Row Name 09/13/21 1530          Gait/Stairs (Locomotion)    Craig Level (Gait)  contact guard  -CF     Assistive Device (Gait)  -- no AD  -CF     Distance in Feet (Gait)  80'  -CF     Deviations/Abnormal Patterns (Gait)  kulwinder decreased;gait speed decreased  -CF     Comment (Gait/Stairs)  Appears mildly unsteady, no overt LOB but uses wall and rails in hallway for support. Pace slow and cautious  -CF       User Key  (r) = Recorded By, (t) = Taken By, (c) = Cosigned By    Initials Name Provider Type    CF Radha Gaffney PT Physical Therapist        Obj/Interventions     Row Name 09/13/21 1532          Range of Motion Comprehensive    General Range of Motion  bilateral lower extremity ROM WFL  -CF     Row Name 09/13/21 1532          Strength Comprehensive (MMT)    Comment, General Manual Muscle Testing (MMT) Assessment  BLE grossly 3+/5  -CF     Row Name 09/13/21 1532          Balance    Balance Assessment  standing dynamic balance;standing static balance;sitting dynamic balance;sitting static balance  -CF     Static Sitting Balance  WFL;sitting in chair  -CF     Dynamic Sitting Balance  WFL;sitting in chair  -CF     Static Standing Balance  WFL;unsupported  -CF     Dynamic Standing Balance  mild impairment;unsupported  -CF       User Key  (r) = Recorded By, (t) = Taken By, (c) =  Cosigned By    Initials Name Provider Type    CF Radha Gaffney, PT Physical Therapist        Goals/Plan     Row Name 09/13/21 1539          Transfer Goal 1 (PT)    Activity/Assistive Device (Transfer Goal 1, PT)  sit-to-stand/stand-to-sit;bed-to-chair/chair-to-bed;cane, straight  -CF     Bland Level/Cues Needed (Transfer Goal 1, PT)  standby assist  -CF     Time Frame (Transfer Goal 1, PT)  1 week  -CF     Row Name 09/13/21 1539          Gait Training Goal 1 (PT)    Activity/Assistive Device (Gait Training Goal 1, PT)  gait (walking locomotion);cane, straight  -CF     Bland Level (Gait Training Goal 1, PT)  standby assist  -CF     Distance (Gait Training Goal 1, PT)  120'  -CF     Time Frame (Gait Training Goal 1, PT)  1 week  -CF     Row Name 09/13/21 1539          Stairs Goal 1 (PT)    Activity/Assistive Device (Stairs Goal 1, PT)  ascending stairs;descending stairs;using handrail, right  -CF     Bland Level/Cues Needed (Stairs Goal 1, PT)  contact guard assist  -CF     Number of Stairs (Stairs Goal 1, PT)  4  -CF     Time Frame (Stairs Goal 1, PT)  1 week  -CF       User Key  (r) = Recorded By, (t) = Taken By, (c) = Cosigned By    Initials Name Provider Type    CF Radha Gaffney, PT Physical Therapist        Clinical Impression     Row Name 09/13/21 1532          Pain    Additional Documentation  Pain Scale: Numbers Pre/Post-Treatment (Group)  -CF     Row Name 09/13/21 1532          Pain Scale: Numbers Pre/Post-Treatment    Pretreatment Pain Rating  0/10 - no pain  -CF     Posttreatment Pain Rating  2/10  -CF     Pain Location - Side  Left  -CF     Pain Location - Orientation  lower  -CF     Pain Location  hip;extremity  -CF     Pain Intervention(s)  Repositioned;Ambulation/increased activity  -CF     Row Name 09/13/21 1532          Plan of Care Review    Plan of Care Reviewed With  patient  -CF     Outcome Summary  Pt is a 64 yo male admitted with intermittant chest pain the last few  weeks and increased shortness of air. PMH includes but not limited to: DM, HTN, HLD, CAD. Pt reports he is independent without AD use but states that his family would say they help him alot. Unclear as to what he requires assist with. Pt performed sit<>stand and ambulated without assistive device and cga to sba. He did have a tendency to reach for wall and rails in the hallway. PT discussed possible use of cane to help pt with his balance. PT will follow up for 1-2 more sessions as pt is close to baseline level. May benefit from HH vs outpatient PT for higher level balance needs. Anticipate d/c home once medically cleared.  -CF     Row Name 09/13/21 1532          Therapy Assessment/Plan (PT)    Rehab Potential (PT)  good, to achieve stated therapy goals  -CF     Criteria for Skilled Interventions Met (PT)  yes  -CF     Predicted Duration of Therapy Intervention (PT)  1 week  -CF     Row Name 09/13/21 1532          Vital Signs    Pre SpO2 (%)  94  -CF     O2 Delivery Pre Treatment  nasal cannula 1L  -CF     Intra SpO2 (%)  88  -CF     O2 Delivery Intra Treatment  room air 88 on room air with activity  -CF     Post SpO2 (%)  92  -CF     O2 Delivery Post Treatment  nasal cannula after 1L put back on  -CF     Row Name 09/13/21 1532          Positioning and Restraints    Pre-Treatment Position  sitting in chair/recliner  -CF     Post Treatment Position  chair  -CF     In Chair  notified nsg;call light within reach;encouraged to call for assist;sitting no exit alarm at arrival  -CF       User Key  (r) = Recorded By, (t) = Taken By, (c) = Cosigned By    Initials Name Provider Type    CF Radha Gaffney, PT Physical Therapist        Outcome Measures     Row Name 09/13/21 5270          How much help from another person do you currently need...    Turning from your back to your side while in flat bed without using bedrails?  4  -CF     Moving from lying on back to sitting on the side of a flat bed without bedrails?  3  -CF      Moving to and from a bed to a chair (including a wheelchair)?  3  -CF     Standing up from a chair using your arms (e.g., wheelchair, bedside chair)?  3  -CF     Climbing 3-5 steps with a railing?  3  -CF     To walk in hospital room?  3  -CF     AM-PAC 6 Clicks Score (PT)  19  -CF     Row Name 09/13/21 1540          Functional Assessment    Outcome Measure Options  AM-PAC 6 Clicks Basic Mobility (PT)  -CF       User Key  (r) = Recorded By, (t) = Taken By, (c) = Cosigned By    Initials Name Provider Type    CF Radha Gaffney, RAIZA Physical Therapist                       Physical Therapy Education                 Title: PT OT SLP Therapies (Done)     Topic: Physical Therapy (Done)     Point: Mobility training (Done)     Learning Progress Summary           Patient Acceptance, E, VU by CF at 9/13/2021 1541                   Point: Home exercise program (Done)     Learning Progress Summary           Patient Acceptance, E, VU by CF at 9/13/2021 1541                   Point: Body mechanics (Done)     Learning Progress Summary           Patient Acceptance, E, VU by CF at 9/13/2021 1541                   Point: Precautions (Done)     Learning Progress Summary           Patient Acceptance, E, VU by CF at 9/13/2021 1541                               User Key     Initials Effective Dates Name Provider Type Discipline    CF 06/16/21 -  Radha Gaffney PT Physical Therapist PT              PT Recommendation and Plan  Planned Therapy Interventions (PT): balance training, bed mobility training, gait training, home exercise program, ROM (range of motion), stair training, strengthening, patient/family education, transfer training  Plan of Care Reviewed With: patient  Outcome Summary: Pt is a 66 yo male admitted with intermittant chest pain the last few weeks and increased shortness of air. PMH includes but not limited to: DM, HTN, HLD, CAD. Pt reports he is independent without AD use but states that his family would say they  help him alot. Unclear as to what he requires assist with. Pt performed sit<>stand and ambulated without assistive device and cga to sba. He did have a tendency to reach for wall and rails in the hallway. PT discussed possible use of cane to help pt with his balance. PT will follow up for 1-2 more sessions as pt is close to baseline level. May benefit from HH vs outpatient PT for higher level balance needs. Anticipate d/c home once medically cleared.     Time Calculation:   PT Charges     Row Name 09/13/21 1525             Time Calculation    Start Time  1415  -CF      Stop Time  1426  -CF      Time Calculation (min)  11 min  -CF      PT Received On  09/13/21  -CF      PT - Next Appointment  09/14/21  -CF      PT Goal Re-Cert Due Date  09/20/21  -CF         Time Calculation- PT    Total Timed Code Minutes- PT  8 minute(s)  -CF         Timed Charges    94597 - PT Therapeutic Activity Minutes  8  -CF         Total Minutes    Timed Charges Total Minutes  8  -CF       Total Minutes  8  -CF        User Key  (r) = Recorded By, (t) = Taken By, (c) = Cosigned By    Initials Name Provider Type    CF Radha Gaffney, PT Physical Therapist        Therapy Charges for Today     Code Description Service Date Service Provider Modifiers Qty    76332394592 HC PT EVAL LOW COMPLEXITY 2 9/13/2021 Radha Gaffney, PT GP 1    40280563436 HC PT THER PROC EA 15 MIN 9/13/2021 Radha Gaffney, PT GP 1          PT G-Codes  Outcome Measure Options: AM-PAC 6 Clicks Basic Mobility (PT)  AM-PAC 6 Clicks Score (PT): 19    Radha Gaffney PT  9/13/2021

## 2021-09-13 NOTE — PROGRESS NOTES
"Daily progress note    Chief complaint  Doing better  No new complaints  Denies any chest pain shortness of breath or palpitation    History of present illness  65-year-old white male with history of diabetes hypertension hyperlipidemia and known coronary artery disease admitted to emergency room with intermittent chest pain for last several weeks with shortness of breath and nausea but no vomiting.  Patient also stated he gets nonradiating pain throbbing type but sometimes get diaphoretic.  I am asked to follow the patient for medical problem.  Patient is chest pain-free at the time of interview.  Patient also denies any fever cough congestion night sweats weight loss or weight gain.  Patient underwent cardiac cath this morning secondary to non-ST elevation MI and had angioplasty and stent placed.  And asked to follow the patient for medical problems specially diabetes.     REVIEW OF SYSTEMS  Unremarkable except generalized weakness    PHYSICAL EXAM   Blood pressure 141/76, pulse 87, temperature 97.6 °F (36.4 °C), temperature source Oral, resp. rate 16, height 170.2 cm (67\"), weight 98.6 kg (217 lb 6.4 oz), SpO2 95 %.    Constitutional: Pt. is oriented to person, place, and time and well-developed, well-nourished, and in no distress.  He is disheveled.  HENT: Normocephalic and atraumatic. Oropharynx moist/nonerythematous.  Neck: Normal range of motion. Neck supple. No JVD present.   Cardiovascular: Normal rate, regular rhythm and normal heart sounds. Exam reveals no gallop and no friction rub.   No murmur heard.  Pulmonary/Chest: Effort normal and breath sounds normal. No stridor. No respiratory distress. No wheezes, no rales.   Abdominal: Soft. Bowel sounds are normal. No distension. There is no tenderness. There is no rebound and no guarding.   Musculoskeletal: Normal range of motion. No edema, tenderness or deformity.   Neurological: Pt. is alert and oriented to person, place, and time.  He has no focal " neurologic deficits  Skin: Skin is warm and dry.  He has an eczematous, flaking rash over his lower extremities that he states has been there for years.  Pt. is not diaphoretic. No erythema.   Psychiatric: Mood, affect and judgment normal.  He is pleasant and cooperative.    LAB RESULTS  Lab Results (last 24 hours)     Procedure Component Value Units Date/Time    POC Glucose Once [[id number]]  (Abnormal) Collected: 09/13/21 1036    Specimen: Blood Updated: 09/13/21 1054     Glucose 176 mg/dL      Comment: Meter: [id number] : [id number] [employer]       POC Glucose Once [[id number]]  (Abnormal) Collected: 09/13/21 0606    Specimen: Blood Updated: 09/13/21 0609     Glucose 156 mg/dL      Comment: Meter: [id number] : [id number] Vitor Corey RN       Basic Metabolic Panel [[id number]]  (Abnormal) Collected: 09/13/21 0308    Specimen: Blood Updated: 09/13/21 0455     Glucose 169 mg/dL      BUN 18 mg/dL      Creatinine 0.70 mg/dL      Sodium 137 mmol/L      Potassium 4.2 mmol/L      Chloride 94 mmol/L      CO2 30.0 mmol/L      Calcium 9.7 mg/dL      eGFR Non African Amer 113 mL/min/1.73      BUN/Creatinine Ratio 25.7     Anion Gap 13.0 mmol/L     Narrative:      GFR Normal >60  Chronic Kidney Disease <60  Kidney Failure <15      CBC & Differential [[id number]]  (Abnormal) Collected: 09/13/21 0308    Specimen: Blood Updated: 09/13/21 0447    Narrative:      The following orders were created for panel order CBC & Differential.  Procedure                               Abnormality         Status                     ---------                               -----------         ------                     CBC Auto Differential[[id number]]        Abnormal            Final result                 Please view results for these tests on the individual orders.    CBC Auto Differential [[id number]]  (Abnormal) Collected: 09/13/21 0308    Specimen: Blood Updated: 09/13/21 0447     WBC 11.18 10*3/mm3      RBC 6.10 10*6/mm3       Hemoglobin 18.2 g/dL      Hematocrit 53.8 %      MCV 88.2 fL      MCH 29.8 pg      MCHC 33.8 g/dL      RDW 13.0 %      RDW-SD 41.6 fl      MPV 11.5 fL      Platelets 182 10*3/mm3      Neutrophil % 64.0 %      Lymphocyte % 20.3 %      Monocyte % 11.7 %      Eosinophil % 2.9 %      Basophil % 0.6 %      Immature Grans % 0.5 %      Neutrophils, Absolute 7.15 10*3/mm3      Lymphocytes, Absolute 2.27 10*3/mm3      Monocytes, Absolute 1.31 10*3/mm3      Eosinophils, Absolute 0.32 10*3/mm3      Basophils, Absolute 0.07 10*3/mm3      Immature Grans, Absolute 0.06 10*3/mm3      nRBC 0.0 /100 WBC     aPTT [[id number]]  (Normal) Collected: 09/13/21 0308    Specimen: Blood Updated: 09/13/21 0446     PTT 29.6 seconds     POC Glucose Once [[id number]]  (Abnormal) Collected: 09/12/21 2021    Specimen: Blood Updated: 09/12/21 2022     Glucose 161 mg/dL      Comment: Meter: [id number] : [id number] Sue LOPEZ              Study Result    Narrative & Impression   XR CHEST 2 VW-     HISTORY: Male who is 65 years-old,  chest pain     TECHNIQUE: Frontal and lateral views of the chest     COMPARISON: 04/13/2018     FINDINGS: The heart size appears borderline. Pulmonary vasculature is  unremarkable. Mild right and mild to moderate left pleural effusions and  bilateral basilar atelectasis or infiltrate are seen. No pneumothorax.  No acute osseous process.     IMPRESSION:  Mild right and mild to moderate left pleural effusions and  bilateral basilar atelectasis or infiltrate            ECG 12 Lead  Component   Ref Range & Units 9/9/21 0241 9/8/21 2031   QT Interval   ms 394 P  393         HEART RATE= 82  bpm  RR Interval= 732  ms  CO Interval= 203  ms  P Horizontal Axis= -32  deg  P Front Axis= 53  deg  QRSD Interval= 97  ms  QT Interval= 394  ms  QRS Axis= -19  deg  T Wave Axis= 158  deg  - ABNORMAL ECG -  Sinus rhythm  Probable left atrial enlargement  Borderline left axis deviation  Consider anterior infarct  Abnormal T,  consider ischemia, lateral leads               Current Facility-Administered Medications:   •  acetaminophen (TYLENOL) tablet 650 mg, 650 mg, Oral, Q4H PRN, Zane De La O MD, 650 mg at 09/13/21 0255  •  aspirin EC tablet 81 mg, 81 mg, Oral, Daily, Zane De La O MD, 81 mg at 09/13/21 0855  •  atorvastatin (LIPITOR) tablet 40 mg, 40 mg, Oral, Nightly, Zane De La O MD, 40 mg at 09/12/21 2104  •  cetirizine (zyrTEC) tablet 10 mg, 10 mg, Oral, Daily, Taj Liriano MD, 10 mg at 09/13/21 0855  •  [DISCONTINUED] clopidogrel (PLAVIX) tablet 600 mg, 600 mg, Oral, Once **AND** clopidogrel (PLAVIX) tablet 75 mg, 75 mg, Oral, Daily, Zane De La O MD, 75 mg at 09/13/21 0855  •  clotrimazole-betamethasone (LOTRISONE) 1-0.05 % cream 1 application, 1 application, Topical, Q12H, Taj Liriano MD, 1 application at 09/13/21 0855  •  furosemide (LASIX) tablet 60 mg, 60 mg, Oral, BID, Zane De La O MD, 60 mg at 09/13/21 0855  •  heparin (porcine) 5000 UNIT/ML injection 3,100-4,000 Units, 30-39.2 Units/kg, Intravenous, Q6H PRN, Zane De La O MD, 4,000 Units at 09/09/21 0043  •  influenza vac split quad (FLUZONE,FLUARIX,AFLURIA,FLULAVAL) injection 0.5 mL, 0.5 mL, Intramuscular, Once, Taj Liriano MD  •  insulin glargine (LANTUS, SEMGLEE) injection 20 Units, 20 Units, Subcutaneous, Nightly, Taj Liriano MD, 20 Units at 09/12/21 2105  •  insulin lispro (ADMELOG) injection 0-7 Units, 0-7 Units, Subcutaneous, TID AC, Taj Liriano MD, 2 Units at 09/13/21 1132  •  insulin lispro (ADMELOG) injection 7 Units, 7 Units, Subcutaneous, TID With Meals, Taj Liriano MD, 7 Units at 09/13/21 1132  •  metoprolol tartrate (LOPRESSOR) tablet 25 mg, 25 mg, Oral, Q12H, Zane De La O MD, 25 mg at 09/13/21 0855  •  pantoprazole (PROTONIX) EC tablet 40 mg, 40 mg, Oral, Q AM, Taj Liriano MD, 40 mg at 09/13/21 0609  •  Petrolatum ointment 1 application, 1 application, Topical, Q24H, Taj Liriano MD, 1  application at 09/13/21 0855  •  potassium chloride (K-DUR,KLOR-CON) ER tablet 10 mEq, 10 mEq, Oral, BID With Meals, Delmi Liriano MD, 10 mEq at 09/13/21 0855  •  potassium chloride (K-DUR,KLOR-CON) ER tablet 40 mEq, 40 mEq, Oral, PRN, Haleigh Powell MD, 40 mEq at 09/11/21 1125  •  potassium chloride (KLOR-CON) packet 40 mEq, 40 mEq, Oral, PRN, Haleigh Powell MD  •  sacubitril-valsartan (ENTRESTO) 24-26 MG tablet 1 tablet, 1 tablet, Oral, Q12H, Zane De La O MD, 1 tablet at 09/13/21 0855  •  sodium chloride 0.9 % flush 10 mL, 10 mL, Intravenous, PRN, Zane De La O MD, 10 mL at 09/12/21 2105     ASSESSMENT  Acute non-ST elevation MI status post angioplasty with stent placement  Coronary artery disease  Acute systolic congestive heart failure  Diabetes mellitus  Hypertension  Hyperlipidemia  Chronic bilateral lower extremity rash improving  Gastroesophageal reflux disease    PLAN  CPM  Post PCI care  Aspirin Plavix Lopressor Entresto and Lipitor  Diuresis  Continue Lotrisone  Stress ulcer prophylaxis  Accu-Chek with sliding scale insulin  Supportive care  PT/OT  Discussed with nursing staff  DSischarge planning    DELMI LIRIANO MD

## 2021-09-13 NOTE — CONSULTS
"Diabetes Education  Assessment/Teaching    Patient Name:  Matt Cruz  YOB: 1955  MRN: [id number]  Admit Date:  9/8/2021      Assessment Date:  9/13/2021    Most Recent Value   General Information    Referral From:  A1c, Blood glucose, Database [A1C 9.7%]   Height  170.2 cm (67\")   Height Method  Stated   Weight  98.6 kg (217 lb 6.4 oz)   Weight Method  Bed scale   Pregnancy Assessment   Diabetes History   What type of diabetes do you have?  Type 2   Length of Diabetes Diagnosis  10 + years   Current DM knowledge  fair   Have you had diabetes education/teaching in the past?  no   Do you test your blood sugar at home?  yes   Frequency of checks  every once in awhile   Have you had low blood sugar? (<70mg/dl)  no   Have you had high blood sugar? (>140mg/dl)  yes   How often do you have high blood sugar?  occasionally   What makes it difficult for you to take care of your diabetes or yourself?  numerous health issues   Education Preferences   What areas of diabetes would you like to learn about?  medications for diabetes, avoiding high blood sugar   Nutrition Information   Assessment Topics   Healthy Eating - Assessment  N/A-unable to assess   Being Active - Assessment  N/A- unable to assess   Taking Medication - Assessment  Needs education   Monitoring - Assessment  Needs education   DM Goals            Most Recent Value   DM Education Needs   Meter  Has own   Frequency of Testing  Weekly   Medication  Insulin [levemir 20U QD]   Physical Activity  None   Physical Activity Frequency  Never   Healthy Coping  Appropriate   Discharge Plan  Home   Motivation  Not interested   Teaching Method  Discussion   Patient Response  Verbalized understanding            Other Comments:  Discussed with pt his diabetes management.He states he rarely tests his BG,does not follow any special diet and does not always take insulin. Not interested in delving into how to problem solve any of this.        Electronically " signed by:  No Torre RN  09/13/21 13:59 EDT

## 2021-09-13 NOTE — PROGRESS NOTES
"Patient Name: Matt Cruz  :1955  65 y.o.      Patient Care Team:  Yelena Jacobson APRN as PCP - General (Nurse Practitioner)    Interval History:   Coronary artery disease    Subjective:  Following for coronary artery disease    Objective   Vital Signs  Temp:  [97.8 °F (36.6 °C)-98.7 °F (37.1 °C)] 97.8 °F (36.6 °C)  Heart Rate:  [72-93] 93  Resp:  [16-17] 17  BP: (115-148)/(60-79) 119/62    Intake/Output Summary (Last 24 hours) at 2021 0845  Last data filed at 2021 0745  Gross per 24 hour   Intake 690 ml   Output 2000 ml   Net -1310 ml     Flowsheet Rows      First Filed Value   Admission Height  170.2 cm (67\") Documented at 2021 1655   Admission Weight  102 kg (225 lb) Documented at 2021 1655          Physical Exam:   General Appearance:    Alert, cooperative, in no acute distress   Lungs:     Clear to auscultation.  Normal respiratory effort and rate.      Heart:    Regular rhythm and normal rate, normal S1 and S2, no murmurs, gallops or rubs.     Chest Wall:    No abnormalities observed   Abdomen:     Soft, nontender, positive bowel sounds.     Extremities:   no cyanosis, clubbing or edema.  No marked joint deformities.  Adequate musculoskeletal strength.       Results Review:    Results from last 7 days   Lab Units 21  0308   SODIUM mmol/L 137   POTASSIUM mmol/L 4.2   CHLORIDE mmol/L 94*   CO2 mmol/L 30.0*   BUN mg/dL 18   CREATININE mg/dL 0.70*   GLUCOSE mg/dL 169*   CALCIUM mg/dL 9.7     Results from last 7 days   Lab Units 09/10/21  0440 21  0641 21  1703   TROPONIN T ng/mL 0.193* 0.118* 0.069*     Results from last 7 days   Lab Units 21  0308   WBC 10*3/mm3 11.18*   HEMOGLOBIN g/dL 18.2*   HEMATOCRIT % 53.8*   PLATELETS 10*3/mm3 182     Results from last 7 days   Lab Units 21  0308 21  0406 21  0348 21  2328 21  1532   INR   --   --   --   --  1.00   APTT seconds 29.6 30.4 27.3   < > 27.8    < > = values in this " interval not displayed.     Results from last 7 days   Lab Units 09/10/21  0440   CHOLESTEROL mg/dL 143         Results from last 7 days   Lab Units 09/10/21  0440   CHOLESTEROL mg/dL 143   TRIGLYCERIDES mg/dL 107   HDL CHOL mg/dL 39*   LDL CHOL mg/dL 84         Medication Review:   aspirin, 81 mg, Oral, Daily  atorvastatin, 40 mg, Oral, Nightly  cetirizine, 10 mg, Oral, Daily  clopidogrel, 75 mg, Oral, Daily  clotrimazole-betamethasone, 1 application, Topical, Q12H  furosemide, 60 mg, Oral, BID  influenza vaccine, 0.5 mL, Intramuscular, Once  insulin glargine, 20 Units, Subcutaneous, Nightly  insulin lispro, 0-7 Units, Subcutaneous, TID AC  insulin lispro, 7 Units, Subcutaneous, TID With Meals  metoprolol tartrate, 25 mg, Oral, Q12H  pantoprazole, 40 mg, Oral, Q AM  Petrolatum, 1 application, Topical, Q24H  potassium chloride, 10 mEq, Oral, BID With Meals  sacubitril-valsartan, 1 tablet, Oral, Q12H              Assessment/Plan       1.  Non-ST elevation myocardial infarction.  Recent intervention to the circumflex.  Chronically totally occluded right coronary artery.  Diffuse LAD disease.  2.  Acute on chronic systolic congestive heart failure.  Diuresing well.  Reviewed laboratory data.  On oral diuretics.  Plan is to try to get him moving more today and get him off oxygen and hopefully be ready for discharge tomorrow.  3.  Skin rash.  Possibly related to lisinopril.  Change was made to Entresto.  4.  Diabetes type 2.  Poorly controlled  4.  Hyperlipidemia.  On atorvastatin 40 mg.      Ashley Alba MD, [facility]  09/13/21  08:45 EDT

## 2021-09-13 NOTE — PLAN OF CARE
Goal Outcome Evaluation:  Plan of Care Reviewed With: patient           Outcome Summary: Pt is a 64 yo male admitted with intermittant chest pain the last few weeks and increased shortness of air. PMH includes but not limited to: DM, HTN, HLD, CAD. Pt reports he is independent without AD use but states that his family would say they help him alot. Unclear as to what he requires assist with. Pt performed sit<>stand and ambulated without assistive device and cga to sba. He did have a tendency to reach for wall and rails in the hallway. PT discussed possible use of cane to help pt with his balance. PT will follow up for 1-2 more sessions as pt is close to baseline level. May benefit from HH vs outpatient PT for higher level balance needs. Anticipate d/c home once medically cleared.    Patient was intermittently wearing a face mask during this therapy encounter. Therapist used appropriate personal protective equipment including eye protection, mask, and gloves.  Mask used was standard procedure mask. Appropriate PPE was worn during the entire therapy session. Hand hygiene was completed before and after therapy session. Patient is not in enhanced droplet precautions.

## 2021-09-14 LAB
APTT PPP: 29.3 SECONDS (ref 22.7–35.4)
BASOPHILS # BLD AUTO: 0.08 10*3/MM3 (ref 0–0.2)
BASOPHILS NFR BLD AUTO: 0.8 % (ref 0–1.5)
DEPRECATED RDW RBC AUTO: 42.3 FL (ref 37–54)
EOSINOPHIL # BLD AUTO: 0.26 10*3/MM3 (ref 0–0.4)
EOSINOPHIL NFR BLD AUTO: 2.5 % (ref 0.3–6.2)
ERYTHROCYTE [DISTWIDTH] IN BLOOD BY AUTOMATED COUNT: 13.2 % (ref 12.3–15.4)
GLUCOSE BLDC GLUCOMTR-MCNC: 116 MG/DL (ref 70–130)
GLUCOSE BLDC GLUCOMTR-MCNC: 156 MG/DL (ref 70–130)
GLUCOSE BLDC GLUCOMTR-MCNC: 212 MG/DL (ref 70–130)
GLUCOSE BLDC GLUCOMTR-MCNC: 253 MG/DL (ref 70–130)
HCT VFR BLD AUTO: 52.7 % (ref 37.5–51)
HGB BLD-MCNC: 17.7 G/DL (ref 13–17.7)
IMM GRANULOCYTES # BLD AUTO: 0.06 10*3/MM3 (ref 0–0.05)
IMM GRANULOCYTES NFR BLD AUTO: 0.6 % (ref 0–0.5)
LYMPHOCYTES # BLD AUTO: 2.59 10*3/MM3 (ref 0.7–3.1)
LYMPHOCYTES NFR BLD AUTO: 24.9 % (ref 19.6–45.3)
MCH RBC QN AUTO: 29.7 PG (ref 26.6–33)
MCHC RBC AUTO-ENTMCNC: 33.6 G/DL (ref 31.5–35.7)
MCV RBC AUTO: 88.6 FL (ref 79–97)
MONOCYTES # BLD AUTO: 1.25 10*3/MM3 (ref 0.1–0.9)
MONOCYTES NFR BLD AUTO: 12 % (ref 5–12)
NEUTROPHILS NFR BLD AUTO: 59.2 % (ref 42.7–76)
NEUTROPHILS NFR BLD AUTO: 6.18 10*3/MM3 (ref 1.7–7)
NRBC BLD AUTO-RTO: 0 /100 WBC (ref 0–0.2)
PLATELET # BLD AUTO: 181 10*3/MM3 (ref 140–450)
PMV BLD AUTO: 11.3 FL (ref 6–12)
RBC # BLD AUTO: 5.95 10*6/MM3 (ref 4.14–5.8)
WBC # BLD AUTO: 10.42 10*3/MM3 (ref 3.4–10.8)

## 2021-09-14 PROCEDURE — 97166 OT EVAL MOD COMPLEX 45 MIN: CPT

## 2021-09-14 PROCEDURE — 99232 SBSQ HOSP IP/OBS MODERATE 35: CPT | Performed by: NURSE PRACTITIONER

## 2021-09-14 PROCEDURE — 97535 SELF CARE MNGMENT TRAINING: CPT

## 2021-09-14 PROCEDURE — 82962 GLUCOSE BLOOD TEST: CPT

## 2021-09-14 PROCEDURE — 97110 THERAPEUTIC EXERCISES: CPT

## 2021-09-14 PROCEDURE — 85730 THROMBOPLASTIN TIME PARTIAL: CPT | Performed by: INTERNAL MEDICINE

## 2021-09-14 PROCEDURE — 63710000001 INSULIN GLARGINE PER 5 UNITS: Performed by: HOSPITALIST

## 2021-09-14 PROCEDURE — 85025 COMPLETE CBC W/AUTO DIFF WBC: CPT | Performed by: INTERNAL MEDICINE

## 2021-09-14 PROCEDURE — 63710000001 INSULIN LISPRO (HUMAN) PER 5 UNITS: Performed by: HOSPITALIST

## 2021-09-14 RX ORDER — CARVEDILOL 25 MG/1
25 TABLET ORAL 2 TIMES DAILY WITH MEALS
Status: DISCONTINUED | OUTPATIENT
Start: 2021-09-14 | End: 2021-09-15 | Stop reason: HOSPADM

## 2021-09-14 RX ORDER — ATORVASTATIN CALCIUM 80 MG/1
80 TABLET, FILM COATED ORAL NIGHTLY
Status: DISCONTINUED | OUTPATIENT
Start: 2021-09-14 | End: 2021-09-15 | Stop reason: HOSPADM

## 2021-09-14 RX ORDER — INSULIN LISPRO 100 [IU]/ML
10 INJECTION, SOLUTION INTRAVENOUS; SUBCUTANEOUS
Status: DISCONTINUED | OUTPATIENT
Start: 2021-09-14 | End: 2021-09-15 | Stop reason: HOSPADM

## 2021-09-14 RX ORDER — INSULIN GLARGINE 100 [IU]/ML
30 INJECTION, SOLUTION SUBCUTANEOUS NIGHTLY
Status: DISCONTINUED | OUTPATIENT
Start: 2021-09-14 | End: 2021-09-15 | Stop reason: HOSPADM

## 2021-09-14 RX ADMIN — INSULIN LISPRO 2 UNITS: 100 INJECTION, SOLUTION INTRAVENOUS; SUBCUTANEOUS at 07:52

## 2021-09-14 RX ADMIN — FUROSEMIDE 60 MG: 20 TABLET ORAL at 17:05

## 2021-09-14 RX ADMIN — CLOTRIMAZOLE AND BETAMETHASONE DIPROPIONATE 1 APPLICATION: 10; .5 CREAM TOPICAL at 08:57

## 2021-09-14 RX ADMIN — INSULIN LISPRO 8 UNITS: 100 INJECTION, SOLUTION INTRAVENOUS; SUBCUTANEOUS at 11:23

## 2021-09-14 RX ADMIN — CLOPIDOGREL BISULFATE 75 MG: 75 TABLET, FILM COATED ORAL at 08:56

## 2021-09-14 RX ADMIN — PETROLATUM 1 APPLICATION: 420 OINTMENT TOPICAL at 21:37

## 2021-09-14 RX ADMIN — INSULIN LISPRO 10 UNITS: 100 INJECTION, SOLUTION INTRAVENOUS; SUBCUTANEOUS at 17:05

## 2021-09-14 RX ADMIN — ASPIRIN 81 MG: 81 TABLET, COATED ORAL at 08:56

## 2021-09-14 RX ADMIN — POTASSIUM CHLORIDE 10 MEQ: 750 TABLET, EXTENDED RELEASE ORAL at 17:05

## 2021-09-14 RX ADMIN — INSULIN LISPRO 8 UNITS: 100 INJECTION, SOLUTION INTRAVENOUS; SUBCUTANEOUS at 07:52

## 2021-09-14 RX ADMIN — ATORVASTATIN CALCIUM 80 MG: 80 TABLET, FILM COATED ORAL at 21:37

## 2021-09-14 RX ADMIN — CLOTRIMAZOLE AND BETAMETHASONE DIPROPIONATE 1 APPLICATION: 10; .5 CREAM TOPICAL at 21:37

## 2021-09-14 RX ADMIN — POTASSIUM CHLORIDE 10 MEQ: 750 TABLET, EXTENDED RELEASE ORAL at 08:56

## 2021-09-14 RX ADMIN — PANTOPRAZOLE SODIUM 40 MG: 40 TABLET, DELAYED RELEASE ORAL at 06:08

## 2021-09-14 RX ADMIN — INSULIN GLARGINE 30 UNITS: 100 INJECTION, SOLUTION SUBCUTANEOUS at 21:37

## 2021-09-14 RX ADMIN — FUROSEMIDE 60 MG: 20 TABLET ORAL at 08:56

## 2021-09-14 RX ADMIN — METOPROLOL TARTRATE 25 MG: 25 TABLET ORAL at 08:56

## 2021-09-14 RX ADMIN — CETIRIZINE HYDROCHLORIDE 10 MG: 10 TABLET ORAL at 08:56

## 2021-09-14 RX ADMIN — CARVEDILOL 25 MG: 25 TABLET, FILM COATED ORAL at 21:37

## 2021-09-14 RX ADMIN — SACUBITRIL AND VALSARTAN 1 TABLET: 24; 26 TABLET, FILM COATED ORAL at 21:37

## 2021-09-14 RX ADMIN — INSULIN LISPRO 4 UNITS: 100 INJECTION, SOLUTION INTRAVENOUS; SUBCUTANEOUS at 11:23

## 2021-09-14 RX ADMIN — SACUBITRIL AND VALSARTAN 1 TABLET: 24; 26 TABLET, FILM COATED ORAL at 08:56

## 2021-09-14 RX ADMIN — ACETAMINOPHEN 650 MG: 325 TABLET, FILM COATED ORAL at 14:16

## 2021-09-14 NOTE — PROGRESS NOTES
"CC:  NSTEMI    Interval History: c/o being weak. He is off balance with ambulation and using a walker here which he normally does not use at home. No chest pain. On room air.       Vital Signs  Temp:  [97.6 °F (36.4 °C)-98.5 °F (36.9 °C)] 98.5 °F (36.9 °C)  Heart Rate:  [82-97] 91  Resp:  [16] 16  BP: (127-152)/(73-86) 131/77    Intake/Output Summary (Last 24 hours) at 9/14/2021 0927  Last data filed at 9/14/2021 0829  Gross per 24 hour   Intake 120 ml   Output 3825 ml   Net -3705 ml     Flowsheet Rows      First Filed Value   Admission Height  170.2 cm (67\") Documented at 09/08/2021 1655   Admission Weight  102 kg (225 lb) Documented at 09/08/2021 1655          PHYSICAL EXAM:  General: No acute distress  Resp:NL Rate, unlabored, diminished   CV:NL rate and rhythm, NL PMI, Nl S1 and S2, no Murmur, no gallop, no rub, No JVD. Normal pedal pulses  ABD:Nl sounds, no masses or tenderness, nondistended, no guarding or rebound  Neuro: alert,cooperative and oriented  Extr: + pedal edema, no cyanosis, moves all extremities      Results Review:    Results from last 7 days   Lab Units 09/13/21  0308   SODIUM mmol/L 137   POTASSIUM mmol/L 4.2   CHLORIDE mmol/L 94*   CO2 mmol/L 30.0*   BUN mg/dL 18   CREATININE mg/dL 0.70*   GLUCOSE mg/dL 169*   CALCIUM mg/dL 9.7     Results from last 7 days   Lab Units 09/10/21  0440 09/09/21  0641 09/08/21  1703   TROPONIN T ng/mL 0.193* 0.118* 0.069*     Results from last 7 days   Lab Units 09/14/21  0523   WBC 10*3/mm3 10.42   HEMOGLOBIN g/dL 17.7   HEMATOCRIT % 52.7*   PLATELETS 10*3/mm3 181     Results from last 7 days   Lab Units 09/14/21  0523 09/13/21  0308 09/12/21  0406 09/08/21  2328 09/08/21  1532   INR   --   --   --   --  1.00   APTT seconds 29.3 29.6 30.4   < > 27.8    < > = values in this interval not displayed.     Results from last 7 days   Lab Units 09/10/21  0440   CHOLESTEROL mg/dL 143         Results from last 7 days   Lab Units 09/10/21  0440   CHOLESTEROL mg/dL 143 "   TRIGLYCERIDES mg/dL 107   HDL CHOL mg/dL 39*   LDL CHOL mg/dL 84     I reviewed the patient's new clinical results.  I personally viewed and interpreted the patient's EKG/Telemetry data- NSR/ ST        Medication Review:   Meds reviewed         Assessment/Plan    1.  Non-ST elevation myocardial infarction.  Recent intervention to the circumflex.  Chronically totally occluded right coronary artery. Diffuse LAD disease.  2.  Acute on chronic systolic congestive heart failure. EF 32% ( new)  Diuresed well.  On oral diuretic regimen and now on Entresto.  Will switch metoprolol tartrate for carvedilol home dose.   3.  Skin rash. Possibly related to lisinopril.  Change was made to Entresto.  4.  Diabetes type 2.  Poorly controlled hemoglobin a1c 9.7- internal med following. Consider adding Jardiance for cardiovascular benefit if feasible  4.  Hyperlipidemia. Will resume home atorvastatin 80 mg      ISABELLA Mclean  09/14/21  09:27 EDT

## 2021-09-14 NOTE — THERAPY EVALUATION
Patient Name: Matt Cruz  : 1955    MRN: [id number]                              Today's Date: 2021       Admit Date: 2021    Visit Dx:     ICD-10-CM ICD-9-CM   1. Chest pain with high risk of acute coronary syndrome  R07.9 786.50   2. Coronary artery disease involving native heart without angina pectoris, unspecified vessel or lesion type  I25.10 414.01   3. NSTEMI, initial episode of care (CMS/[facility])  I21.4 410.71   4. S/P drug eluting coronary stent placement  Z95.5 V45.82     Patient Active Problem List   Diagnosis   • Exertional chest pain   • Essential hypertension   • TIA (transient ischemic attack)   • Type 2 diabetes mellitus with other specified complication (CMS/[facility])   • HLD (hyperlipidemia)   • Carotid stenosis   • Coronary artery disease   • Chest pain with high risk of acute coronary syndrome     Past Medical History:   Diagnosis Date   • CAD (coronary artery disease)    • Carotid stenosis    • Coronary artery disease    • Elevated cholesterol    • Essential hypertension    • Exertional chest pain    • Hyperlipidemia    • Hypertension    • Kidney stone    • TIA (transient ischemic attack)    • Type 2 diabetes mellitus with other specified complication (CMS/[facility])      Past Surgical History:   Procedure Laterality Date   • CARDIAC CATHETERIZATION Left 2018    Procedure: Cardiac Catheterization/Vascular Study;  Surgeon: Zane De La O MD;  Location: [facility] CATH INVASIVE LOCATION;  Service: Cardiovascular   • CARDIAC CATHETERIZATION N/A 2018    Procedure: Stent KD coronary;  Surgeon: Zane De La O MD;  Location: [facility] CATH INVASIVE LOCATION;  Service: Cardiovascular   • CARDIAC CATHETERIZATION N/A 2021    Procedure: Left Heart Cath;  Surgeon: Zane De La O MD;  Location: [facility] CATH INVASIVE LOCATION;  Service: Cardiology;  Laterality: N/A;   • CARDIAC CATHETERIZATION N/A 2021    Procedure: Coronary angiography;  Surgeon: Zane De La O MD;   Location:  [facility] CATH INVASIVE LOCATION;  Service: Cardiology;  Laterality: N/A;   • CARDIAC CATHETERIZATION N/A 9/9/2021    Procedure: Left ventriculography;  Surgeon: Zane De La O MD;  Location:  [facility] CATH INVASIVE LOCATION;  Service: Cardiology;  Laterality: N/A;   • CARDIAC CATHETERIZATION N/A 9/9/2021    Procedure: Stent KD coronary;  Surgeon: Zane De La O MD;  Location:  [facility] CATH INVASIVE LOCATION;  Service: Cardiology;  Laterality: N/A;   • CARDIAC CATHETERIZATION N/A 9/9/2021    Procedure: Percutaneous Coronary Intervention;  Surgeon: Zane De La O MD;  Location:  [facility] CATH INVASIVE LOCATION;  Service: Cardiology;  Laterality: N/A;   • CORONARY ANGIOPLASTY WITH STENT PLACEMENT     • KIDNEY STONE SURGERY       General Information     Row Name 09/14/21 0955          OT Time and Intention    Document Type  evaluation  -MR     Mode of Treatment  individual therapy;occupational therapy  -MR     Row Name 09/14/21 0955          General Information    Patient Profile Reviewed  yes  -MR     Prior Level of Function  independent:;ADL's  -MR     Existing Precautions/Restrictions  fall  -MR     Barriers to Rehab  none identified  -MR     Row Name 09/14/21 0955          Cognition    Orientation Status (Cognition)  oriented x 4  -MR     Row Name 09/14/21 0955          Safety Issues, Functional Mobility    Safety Issues Affecting Function (Mobility)  insight into deficits/self-awareness  -MR     Impairments Affecting Function (Mobility)  balance;endurance/activity tolerance;strength  -MR       User Key  (r) = Recorded By, (t) = Taken By, (c) = Cosigned By    Initials Name Provider Type     ColleenDora, OT Occupational Therapist          Mobility/ADL's     Row Name 09/14/21 0955          Bed Mobility    Comment (Bed Mobility)  up in chair  -MR     Row Name 09/14/21 0955          Transfers    Transfers  sit-stand transfer;toilet transfer  -MR     Sit-Stand [place] (Transfers)  contact  guard;verbal cues  -MR     [place] Level (Toilet Transfer)  contact guard;verbal cues  -MR     Assistive Device (Toilet Transfer)  -- no AD  -MR     Row Name 09/14/21 0955          Sit-Stand Transfer    Assistive Device (Sit-Stand Transfers)  -- no AD  -MR     Row Name 09/14/21 0955          Toilet Transfer    Type (Toilet Transfer)  sit-stand;stand-sit  -MR     Row Name 09/14/21 0955          Functional Mobility    Functional Mobility- Ind. Level  contact guard assist;verbal cues required performs functional mobility from chair to bathroom, returns to chair  -MR     Functional Mobility- Device  -- no AD  -MR     Row Name 09/14/21 0955          Activities of Daily Living    BADL Assessment/Intervention  toileting;grooming;lower body dressing  -MR     Row Name 09/14/21 0955          Toileting Assessment/Training    [place] Level (Toileting)  toileting skills;standby assist;contact guard assist  -MR     Position (Toileting)  supported sitting;supported standing  -MR     Row Name 09/14/21 0955          Grooming Assessment/Training    [place] Level (Grooming)  grooming skills;set up;standby assist  -MR     Position (Grooming)  supported sitting  -MR     Row Name 09/14/21 0955          Lower Body Dressing Assessment/Training    [place] Level (Lower Body Dressing)  lower body dressing skills;doff;don;socks;standby assist  -MR     Position (Lower Body Dressing)  supported sitting  -MR       User Key  (r) = Recorded By, (t) = Taken By, (c) = Cosigned By    Initials Name Provider Type    MR Macias Dora Mayers, OT Occupational Therapist        Obj/Interventions     Row Name 09/14/21 1159          Range of Motion Comprehensive    General Range of Motion  bilateral upper extremity ROM WFL  -MR     Row Name 09/14/21 1159          Strength Comprehensive (MMT)    Comment, General Manual Muscle Testing (MMT) Assessment  pt with generalized weakness  -MR     Row Name 09/14/21 1159          Shoulder (Therapeutic  Exercise)    Shoulder (Therapeutic Exercise)  AROM (active range of motion)  -MR     Shoulder AROM (Therapeutic Exercise)  bilateral;flexion;horizontal aBduction/aDduction;scapular protraction;scapular retraction;sitting  -MR     Row Name 09/14/21 1159          Elbow/Forearm (Therapeutic Exercise)    Elbow/Forearm (Therapeutic Exercise)  AROM (active range of motion)  -MR     Elbow/Forearm AROM (Therapeutic Exercise)  bilateral;supination;pronation;sitting  -MR     Row Name 09/14/21 1159          Hand (Therapeutic Exercise)    Hand (Therapeutic Exercise)  AROM (active range of motion)  -MR     Hand AROM/AAROM (Therapeutic Exercise)  bilateral;AROM (active range of motion);finger flexion;finger extension  -MR     Row Name 09/14/21 1159          Motor Skills    Motor Skills  coordination  -MR     Coordination  -- UE coordination intact for ADLs  -MR     Row Name 09/14/21 1159          Balance    Balance Assessment  sitting static balance;standing static balance  -MR     Static Sitting Balance  WFL;sitting in chair seated on commode  -MR     Static Standing Balance  mild impairment  -MR     Row Name 09/14/21 1159          Therapeutic Exercise    Therapeutic Exercise  shoulder;elbow/forearm;hand  -MR       User Key  (r) = Recorded By, (t) = Taken By, (c) = Cosigned By    Initials Name Provider Type    MR Macias Alisha, OT Occupational Therapist        Goals/Plan     Row Name 09/14/21 1211          Transfer Goal 1 (OT)    Activity/Assistive Device (Transfer Goal 1, OT)  toilet;bed-to-chair/chair-to-bed  -MR     [place] Level/Cues Needed (Transfer Goal 1, OT)  modified independence;standby assist  -MR     Time Frame (Transfer Goal 1, OT)  long term goal (LTG);by discharge  -MR     Row Name 09/14/21 1211          Grooming Goal 1 (OT)    Activity/Device (Grooming Goal 1, OT)  grooming skills, all  -MR     [place] (Grooming Goal 1, OT)  modified independence;standby assist  -MR     Time Frame (Grooming Goal 1,  OT)  long term goal (LTG);by discharge  -MR     Row Name 09/14/21 1211          Strength Goal 1 (OT)    Strength Goal 1 (OT)  Pt to demo independence with UE strengthening HEP.  -MR     Time Frame (Strength Goal 1, OT)  long term goal (LTG);by discharge  -MR     Row Name 09/14/21 1211          Therapy Assessment/Plan (OT)    Planned Therapy Interventions (OT)  activity tolerance training;BADL retraining;functional balance retraining;occupation/activity based interventions;patient/caregiver education/training;ROM/therapeutic exercise;strengthening exercise;transfer/mobility retraining  -MR       User Key  (r) = Recorded By, (t) = Taken By, (c) = Cosigned By    Initials Name Provider Type    MR MaciasDoras, OT Occupational Therapist        Clinical Impression     Row Name 09/14/21 1201          Pain Assessment    Additional Documentation  Pain Scale: FACES Pre/Post-Treatment (Group)  -     Row Name 09/14/21 1201          Pain Scale: FACES Pre/Post-Treatment    Pain: FACES Scale, Pretreatment  2-->hurts little bit  -MR     Posttreatment Pain Rating  2-->hurts little bit  -MR     Pain Location  back  -MR     Row Name 09/14/21 1201          Plan of Care Review    Plan of Care Reviewed With  patient  -MR     Outcome Summary  Pt is a 64 yo male admitted with intermittant chest pain the last few weeks and increased shortness of air. Pt reports he lives with his spouse and children and is normally independent with ADLs. Upon assessment pt performs functional mobility to bathroom with no AD and completes toileting/toilet transfer with CGA. Pt noted with impaired activity tolerance, impaired self care skills, impaired functional transfers. Recommend skilled OT services to increase safety and independence with performance of ADLs.  -MR     Row Name 09/14/21 1201          Therapy Assessment/Plan (OT)    Rehab Potential (OT)  good, to achieve stated therapy goals  -MR     Criteria for Skilled Therapeutic Interventions  Met (OT)  yes;meets criteria  -MR     Therapy Frequency (OT)  5 times/wk  -MR     Row Name 09/14/21 1201          Therapy Plan Review/Discharge Plan (OT)    Anticipated Discharge Disposition (OT)  home with assist;home with home health  -MR     Row Name 09/14/21 1201          Positioning and Restraints    Pre-Treatment Position  sitting in chair/recliner  -MR     Post Treatment Position  chair  -MR     In Chair  sitting;call light within reach;encouraged to call for assist no exit alarm on OT's arrival  -MR       User Key  (r) = Recorded By, (t) = Taken By, (c) = Cosigned By    Initials Name Provider Type    Dora Ricks, OT Occupational Therapist        Outcome Measures     Row Name 09/14/21 1212          How much help from another is currently needed...    Putting on and taking off regular lower body clothing?  3  -MR     Bathing (including washing, rinsing, and drying)  3  -MR     Toileting (which includes using toilet bed pan or urinal)  3  -MR     Putting on and taking off regular upper body clothing  3  -MR     Taking care of personal grooming (such as brushing teeth)  3  -MR     Eating meals  3  -MR     AM-PAC 6 Clicks Score (OT)  18  -MR     Row Name 09/14/21 1212          Functional Assessment    Outcome Measure Options  AM-PAC 6 Clicks Daily Activity (OT)  -MR       User Key  (r) = Recorded By, (t) = Taken By, (c) = Cosigned By    Initials Name Provider Type    Dora Ricks, OT Occupational Therapist          Occupational Therapy Education                 Title: PT OT SLP Therapies (In Progress)     Topic: Occupational Therapy (In Progress)     Point: ADL training (Done)     Description:   Instruct learner(s) on proper safety adaptation and remediation techniques during self care or transfers.   Instruct in proper use of assistive devices.              Learning Progress Summary           Patient Acceptance, E,TB, VU by MR at 9/14/2021 1212    Comment: Educated on role of OT; OT POC. Pt  with decreased activity tolerance, OT provides instruction on pacing and energy conservation techniques.                   Point: Home exercise program (Not Started)     Description:   Instruct learner(s) on appropriate technique for monitoring, assisting and/or progressing therapeutic exercises/activities.              Learner Progress:  Not documented in this visit.          Point: Precautions (Not Started)     Description:   Instruct learner(s) on prescribed precautions during self-care and functional transfers.              Learner Progress:  Not documented in this visit.          Point: Body mechanics (Not Started)     Description:   Instruct learner(s) on proper positioning and spine alignment during self-care, functional mobility activities and/or exercises.              Learner Progress:  Not documented in this visit.                      User Key     Initials Effective Dates Name Provider Type Discipline    MR 06/16/21 -  Dora Macias, OT Occupational Therapist OT              OT Recommendation and Plan  Planned Therapy Interventions (OT): activity tolerance training, BADL retraining, functional balance retraining, occupation/activity based interventions, patient/caregiver education/training, ROM/therapeutic exercise, strengthening exercise, transfer/mobility retraining  Therapy Frequency (OT): 5 times/wk  Plan of Care Review  Plan of Care Reviewed With: patient  Outcome Summary: Pt is a 66 yo male admitted with intermittant chest pain the last few weeks and increased shortness of air. Pt reports he lives with his spouse and children and is normally independent with ADLs. Upon assessment pt performs functional mobility to bathroom with no AD and completes toileting/toilet transfer with CGA. Pt noted with impaired activity tolerance, impaired self care skills, impaired functional transfers. Recommend skilled OT services to increase safety and independence with performance of ADLs.     Time Calculation:    Time Calculation- OT     Row Name 09/14/21 1214             Time Calculation- OT    OT Start Time  0928  -MR      OT Stop Time  0958  -MR      OT Time Calculation (min)  30 min  -MR      Total Timed Code Minutes- OT  23 minute(s)  -MR      OT Received On  09/14/21  -MR      OT - Next Appointment  09/15/21  -MR      OT Goal Re-Cert Due Date  09/28/21  -MR         Timed Charges    20691 - OT Therapeutic Exercise Minutes  10  -MR      01218 - OT Self Care/Mgmt Minutes  13  -MR         Untimed Charges    OT Eval/Re-eval Minutes  7  -MR         Total Minutes    Timed Charges Total Minutes  23  -MR      Untimed Charges Total Minutes  7  -MR       Total Minutes  30  -MR        User Key  (r) = Recorded By, (t) = Taken By, (c) = Cosigned By    Initials Name Provider Type    Dora Ricks Frances, OT Occupational Therapist        Therapy Charges for Today     Code Description Service Date Service Provider Modifiers Qty    47557634050 HC OT THER PROC EA 15 MIN 9/14/2021 Titosarbjit Dora Mayers, OT GO 1    69625243302 HC OT SELF CARE/MGMT/TRAIN EA 15 MIN 9/14/2021 TitoDora schaffer, OT GO 1    86895486009 HC OT EVAL MOD COMPLEXITY 2 9/14/2021 Dora Macias OT GO 1               Dora Macias OT  9/14/2021

## 2021-09-14 NOTE — PROGRESS NOTES
"Daily progress note    Chief complaint  Doing better  No new complaints  Wants to go home    History of present illness  65-year-old white male with history of diabetes hypertension hyperlipidemia and known coronary artery disease admitted to emergency room with intermittent chest pain for last several weeks with shortness of breath and nausea but no vomiting.  Patient also stated he gets nonradiating pain throbbing type but sometimes get diaphoretic.  I am asked to follow the patient for medical problem.  Patient is chest pain-free at the time of interview.  Patient also denies any fever cough congestion night sweats weight loss or weight gain.  Patient underwent cardiac cath this morning secondary to non-ST elevation MI and had angioplasty and stent placed.  And asked to follow the patient for medical problems specially diabetes.     REVIEW OF SYSTEMS  Unremarkable except generalized weakness    PHYSICAL EXAM   Blood pressure 112/60, pulse 80, temperature 98.2 °F (36.8 °C), temperature source Oral, resp. rate 16, height 170.2 cm (67\"), weight 94.1 kg (207 lb 6.4 oz), SpO2 93 %.    Constitutional: Pt. is oriented to person, place, and time and well-developed, well-nourished, and in no distress.  He is disheveled.  HENT: Normocephalic and atraumatic. Oropharynx moist/nonerythematous.  Neck: Normal range of motion. Neck supple. No JVD present.   Cardiovascular: Normal rate, regular rhythm and normal heart sounds. Exam reveals no gallop and no friction rub.   No murmur heard.  Pulmonary/Chest: Effort normal and breath sounds normal. No stridor. No respiratory distress. No wheezes, no rales.   Abdominal: Soft. Bowel sounds are normal. No distension. There is no tenderness. There is no rebound and no guarding.   Musculoskeletal: Normal range of motion. No edema, tenderness or deformity.   Neurological: Pt. is alert and oriented to person, place, and time.  He has no focal neurologic deficits  Skin: Skin is warm and dry. "  He has an eczematous, flaking rash over his lower extremities that he states has been there for years.  Pt. is not diaphoretic. No erythema.   Psychiatric: Mood, affect and judgment normal.  He is pleasant and cooperative.    LAB RESULTS  Lab Results (last 24 hours)     Procedure Component Value Units Date/Time    POC Glucose Once [[id number]]  (Abnormal) Collected: 09/14/21 1045    Specimen: Blood Updated: 09/14/21 1046     Glucose 253 mg/dL      Comment: Meter: [id number] : [id number] Kade VILLAVICENCIOT       POC Glucose Once [[id number]]  (Abnormal) Collected: 09/14/21 0701    Specimen: Blood Updated: 09/14/21 0702     Glucose 156 mg/dL      Comment: Meter: [id number] : [id number] Eric Webber RN       aPTT [[id number]]  (Normal) Collected: 09/14/21 0523    Specimen: Blood Updated: 09/14/21 0615     PTT 29.3 seconds     CBC & Differential [[id number]]  (Abnormal) Collected: 09/14/21 0523    Specimen: Blood Updated: 09/14/21 0557    Narrative:      The following orders were created for panel order CBC & Differential.  Procedure                               Abnormality         Status                     ---------                               -----------         ------                     CBC Auto Differential[[id number]]        Abnormal            Final result                 Please view results for these tests on the individual orders.    CBC Auto Differential [[id number]]  (Abnormal) Collected: 09/14/21 0523    Specimen: Blood Updated: 09/14/21 0557     WBC 10.42 10*3/mm3      RBC 5.95 10*6/mm3      Hemoglobin 17.7 g/dL      Hematocrit 52.7 %      MCV 88.6 fL      MCH 29.7 pg      MCHC 33.6 g/dL      RDW 13.2 %      RDW-SD 42.3 fl      MPV 11.3 fL      Platelets 181 10*3/mm3      Neutrophil % 59.2 %      Lymphocyte % 24.9 %      Monocyte % 12.0 %      Eosinophil % 2.5 %      Basophil % 0.8 %      Immature Grans % 0.6 %      Neutrophils, Absolute 6.18 10*3/mm3      Lymphocytes, Absolute 2.59 10*3/mm3       Monocytes, Absolute 1.25 10*3/mm3      Eosinophils, Absolute 0.26 10*3/mm3      Basophils, Absolute 0.08 10*3/mm3      Immature Grans, Absolute 0.06 10*3/mm3      nRBC 0.0 /100 WBC     POC Glucose Once [[id number]]  (Abnormal) Collected: 09/13/21 2032    Specimen: Blood Updated: 09/13/21 2034     Glucose 179 mg/dL      Comment: Meter: [id number] : [id number] Nargis Marroquin JULEE              Study Result    Narrative & Impression   XR CHEST 2 VW-     HISTORY: Male who is 65 years-old,  chest pain     TECHNIQUE: Frontal and lateral views of the chest     COMPARISON: 04/13/2018     FINDINGS: The heart size appears borderline. Pulmonary vasculature is  unremarkable. Mild right and mild to moderate left pleural effusions and  bilateral basilar atelectasis or infiltrate are seen. No pneumothorax.  No acute osseous process.     IMPRESSION:  Mild right and mild to moderate left pleural effusions and  bilateral basilar atelectasis or infiltrate            ECG 12 Lead  Component   Ref Range & Units 9/9/21 0241 9/8/21 2031   QT Interval   ms 394 P  393         HEART RATE= 82  bpm  RR Interval= 732  ms  MI Interval= 203  ms  P Horizontal Axis= -32  deg  P Front Axis= 53  deg  QRSD Interval= 97  ms  QT Interval= 394  ms  QRS Axis= -19  deg  T Wave Axis= 158  deg  - ABNORMAL ECG -  Sinus rhythm  Probable left atrial enlargement  Borderline left axis deviation  Consider anterior infarct  Abnormal T, consider ischemia, lateral leads               Current Facility-Administered Medications:   •  acetaminophen (TYLENOL) tablet 650 mg, 650 mg, Oral, Q4H PRN, Zane De La O MD, 650 mg at 09/14/21 1416  •  aspirin EC tablet 81 mg, 81 mg, Oral, Daily, Zane De La O MD, 81 mg at 09/14/21 0856  •  atorvastatin (LIPITOR) tablet 80 mg, 80 mg, Oral, Nightly, Erika Clifford, APRMARY  •  carvedilol (COREG) tablet 25 mg, 25 mg, Oral, BID With Meals, Erika Clifford, APRMARY  •  cetirizine (zyrTEC) tablet 10 mg, 10 mg, Oral, Daily, Deandra  MD Taj, 10 mg at 09/14/21 0856  •  [DISCONTINUED] clopidogrel (PLAVIX) tablet 600 mg, 600 mg, Oral, Once **AND** clopidogrel (PLAVIX) tablet 75 mg, 75 mg, Oral, Daily, Zane De La O MD, 75 mg at 09/14/21 0856  •  clotrimazole-betamethasone (LOTRISONE) 1-0.05 % cream 1 application, 1 application, Topical, Q12H, Taj Liriano MD, 1 application at 09/14/21 0857  •  furosemide (LASIX) tablet 60 mg, 60 mg, Oral, BID, Zane De La O MD, 60 mg at 09/14/21 0856  •  heparin (porcine) 5000 UNIT/ML injection 3,100-4,000 Units, 30-39.2 Units/kg, Intravenous, Q6H PRN, Zane De La O MD, 4,000 Units at 09/09/21 0043  •  influenza vac split quad (FLUZONE,FLUARIX,AFLURIA,FLULAVAL) injection 0.5 mL, 0.5 mL, Intramuscular, Once, Taj Liriano MD  •  insulin glargine (LANTUS, SEMGLEE) injection 25 Units, 25 Units, Subcutaneous, Nightly, Taj Liriano MD, 25 Units at 09/13/21 2254  •  insulin lispro (ADMELOG) injection 0-7 Units, 0-7 Units, Subcutaneous, TID AC, Taj Liriano MD, 4 Units at 09/14/21 1123  •  insulin lispro (ADMELOG) injection 8 Units, 8 Units, Subcutaneous, TID With Meals, Taj Liriano MD, 8 Units at 09/14/21 1123  •  pantoprazole (PROTONIX) EC tablet 40 mg, 40 mg, Oral, Q AM, Taj Liriano MD, 40 mg at 09/14/21 0608  •  Petrolatum ointment 1 application, 1 application, Topical, Q24H, Taj Liriano MD, 1 application at 09/13/21 2255  •  potassium chloride (K-DUR,KLOR-CON) ER tablet 10 mEq, 10 mEq, Oral, BID With Meals, Taj Liriano MD, 10 mEq at 09/14/21 0856  •  potassium chloride (K-DUR,KLOR-CON) ER tablet 40 mEq, 40 mEq, Oral, PRN, Haleigh Powell MD, 40 mEq at 09/11/21 1125  •  potassium chloride (KLOR-CON) packet 40 mEq, 40 mEq, Oral, PRN, Haleigh Powell MD  •  sacubitril-valsartan (ENTRESTO) 24-26 MG tablet 1 tablet, 1 tablet, Oral, Q12H, Zane De La O MD, 1 tablet at 09/14/21 0856  •  sodium chloride 0.9 % flush 10 mL, 10 mL, Intravenous, PRN, Zane De La O MD, 10 mL at  09/12/21 2105     ASSESSMENT  Acute non-ST elevation MI status post angioplasty with stent placement  Coronary artery disease  Acute systolic congestive heart failure  Diabetes mellitus  Hypertension  Hyperlipidemia  Chronic bilateral lower extremity rash improving  Gastroesophageal reflux disease    PLAN  CPM  Post PCI care  Aspirin Plavix Lopressor Entresto and Lipitor  Diuresis  Continue Lotrisone  Stress ulcer prophylaxis  Accu-Chek with sliding scale insulin  Supportive care  PT/OT  Discussed with nursing staff  Okay to discharge on current medications with close follow-up with primary care    DELMI VENCES MD

## 2021-09-14 NOTE — PLAN OF CARE
Goal Outcome Evaluation:  Plan of Care Reviewed With: patient           Outcome Summary: Pt is a 66 yo male admitted with intermittant chest pain the last few weeks and increased shortness of air. Pt reports he lives with his spouse and children and is normally independent with ADLs. Upon assessment pt performs functional mobility to bathroom with no AD and completes toileting/toilet transfer with CGA. Pt noted with impaired activity tolerance, impaired self care skills, impaired functional transfers. Recommend skilled OT services to increase safety and independence with performance of ADLs. Therapist used appropriate personal protective equipment including mask, gloves, and eye protection.  Mask used was standard procedure mask. Appropriate PPE was worn during the entire therapy session. Hand hygiene was completed before and after therapy session. Patient is not in enhanced droplet precautions.

## 2021-09-15 ENCOUNTER — READMISSION MANAGEMENT (OUTPATIENT)
Dept: CALL CENTER | Facility: HOSPITAL | Age: 66
End: 2021-09-15

## 2021-09-15 ENCOUNTER — HOME HEALTH ADMISSION (OUTPATIENT)
Dept: HOME HEALTH SERVICES | Facility: HOME HEALTHCARE | Age: 66
End: 2021-09-15

## 2021-09-15 VITALS
HEART RATE: 67 BPM | HEIGHT: 67 IN | SYSTOLIC BLOOD PRESSURE: 96 MMHG | BODY MASS INDEX: 32.55 KG/M2 | OXYGEN SATURATION: 96 % | TEMPERATURE: 98 F | WEIGHT: 207.4 LBS | DIASTOLIC BLOOD PRESSURE: 54 MMHG | RESPIRATION RATE: 16 BRPM

## 2021-09-15 LAB
ANION GAP SERPL CALCULATED.3IONS-SCNC: 13.6 MMOL/L (ref 5–15)
APTT PPP: 29.4 SECONDS (ref 22.7–35.4)
BASOPHILS # BLD AUTO: 0.08 10*3/MM3 (ref 0–0.2)
BASOPHILS NFR BLD AUTO: 0.8 % (ref 0–1.5)
BUN SERPL-MCNC: 30 MG/DL (ref 8–23)
BUN/CREAT SERPL: 30.6 (ref 7–25)
CALCIUM SPEC-SCNC: 9.1 MG/DL (ref 8.6–10.5)
CHLORIDE SERPL-SCNC: 92 MMOL/L (ref 98–107)
CO2 SERPL-SCNC: 26.4 MMOL/L (ref 22–29)
CREAT SERPL-MCNC: 0.98 MG/DL (ref 0.76–1.27)
DEPRECATED RDW RBC AUTO: 45.3 FL (ref 37–54)
EOSINOPHIL # BLD AUTO: 0.22 10*3/MM3 (ref 0–0.4)
EOSINOPHIL NFR BLD AUTO: 2.3 % (ref 0.3–6.2)
ERYTHROCYTE [DISTWIDTH] IN BLOOD BY AUTOMATED COUNT: 13.2 % (ref 12.3–15.4)
GFR SERPL CREATININE-BSD FRML MDRD: 77 ML/MIN/1.73
GLUCOSE BLDC GLUCOMTR-MCNC: 150 MG/DL (ref 70–130)
GLUCOSE BLDC GLUCOMTR-MCNC: 181 MG/DL (ref 70–130)
GLUCOSE SERPL-MCNC: 191 MG/DL (ref 65–99)
HCT VFR BLD AUTO: 54 % (ref 37.5–51)
HGB BLD-MCNC: 17 G/DL (ref 13–17.7)
IMM GRANULOCYTES # BLD AUTO: 0.04 10*3/MM3 (ref 0–0.05)
IMM GRANULOCYTES NFR BLD AUTO: 0.4 % (ref 0–0.5)
LYMPHOCYTES # BLD AUTO: 2.38 10*3/MM3 (ref 0.7–3.1)
LYMPHOCYTES NFR BLD AUTO: 24.9 % (ref 19.6–45.3)
MCH RBC QN AUTO: 29.1 PG (ref 26.6–33)
MCHC RBC AUTO-ENTMCNC: 31.5 G/DL (ref 31.5–35.7)
MCV RBC AUTO: 92.5 FL (ref 79–97)
MONOCYTES # BLD AUTO: 1.11 10*3/MM3 (ref 0.1–0.9)
MONOCYTES NFR BLD AUTO: 11.6 % (ref 5–12)
NEUTROPHILS NFR BLD AUTO: 5.73 10*3/MM3 (ref 1.7–7)
NEUTROPHILS NFR BLD AUTO: 60 % (ref 42.7–76)
NRBC BLD AUTO-RTO: 0 /100 WBC (ref 0–0.2)
PLATELET # BLD AUTO: 178 10*3/MM3 (ref 140–450)
PMV BLD AUTO: 11.6 FL (ref 6–12)
POTASSIUM SERPL-SCNC: 4.2 MMOL/L (ref 3.5–5.2)
RBC # BLD AUTO: 5.84 10*6/MM3 (ref 4.14–5.8)
SODIUM SERPL-SCNC: 132 MMOL/L (ref 136–145)
WBC # BLD AUTO: 9.56 10*3/MM3 (ref 3.4–10.8)

## 2021-09-15 PROCEDURE — 99238 HOSP IP/OBS DSCHRG MGMT 30/<: CPT | Performed by: NURSE PRACTITIONER

## 2021-09-15 PROCEDURE — 80048 BASIC METABOLIC PNL TOTAL CA: CPT | Performed by: NURSE PRACTITIONER

## 2021-09-15 PROCEDURE — 85025 COMPLETE CBC W/AUTO DIFF WBC: CPT | Performed by: INTERNAL MEDICINE

## 2021-09-15 PROCEDURE — 85730 THROMBOPLASTIN TIME PARTIAL: CPT | Performed by: INTERNAL MEDICINE

## 2021-09-15 PROCEDURE — 63710000001 INSULIN LISPRO (HUMAN) PER 5 UNITS: Performed by: HOSPITALIST

## 2021-09-15 PROCEDURE — 82962 GLUCOSE BLOOD TEST: CPT

## 2021-09-15 RX ORDER — LANCETS 33 GAUGE
EACH MISCELLANEOUS
Qty: 100 EACH | Refills: 0 | Status: SHIPPED | OUTPATIENT
Start: 2021-09-15

## 2021-09-15 RX ORDER — PANTOPRAZOLE SODIUM 40 MG/1
40 TABLET, DELAYED RELEASE ORAL
Qty: 30 TABLET | Refills: 5 | Status: SHIPPED | OUTPATIENT
Start: 2021-09-16 | End: 2022-09-06 | Stop reason: ALTCHOICE

## 2021-09-15 RX ORDER — BLOOD-GLUCOSE METER
EACH MISCELLANEOUS
Qty: 1 KIT | Refills: 0 | Status: SHIPPED | OUTPATIENT
Start: 2021-09-15

## 2021-09-15 RX ORDER — FUROSEMIDE 40 MG/1
40 TABLET ORAL 2 TIMES DAILY
Qty: 60 TABLET | Refills: 5 | Status: SHIPPED | OUTPATIENT
Start: 2021-09-15 | End: 2021-11-04

## 2021-09-15 RX ORDER — POTASSIUM CHLORIDE 750 MG/1
10 TABLET, FILM COATED, EXTENDED RELEASE ORAL 2 TIMES DAILY WITH MEALS
Qty: 60 TABLET | Refills: 3 | Status: SHIPPED | OUTPATIENT
Start: 2021-09-15 | End: 2022-03-04 | Stop reason: SDUPTHER

## 2021-09-15 RX ORDER — BLOOD SUGAR DIAGNOSTIC
STRIP MISCELLANEOUS
Qty: 100 EACH | Refills: 0 | Status: SHIPPED | OUTPATIENT
Start: 2021-09-15

## 2021-09-15 RX ADMIN — SACUBITRIL AND VALSARTAN 1 TABLET: 24; 26 TABLET, FILM COATED ORAL at 08:23

## 2021-09-15 RX ADMIN — CLOTRIMAZOLE AND BETAMETHASONE DIPROPIONATE 1 APPLICATION: 10; .5 CREAM TOPICAL at 08:24

## 2021-09-15 RX ADMIN — FUROSEMIDE 60 MG: 20 TABLET ORAL at 08:23

## 2021-09-15 RX ADMIN — PANTOPRAZOLE SODIUM 40 MG: 40 TABLET, DELAYED RELEASE ORAL at 06:56

## 2021-09-15 RX ADMIN — ACETAMINOPHEN 650 MG: 325 TABLET, FILM COATED ORAL at 04:43

## 2021-09-15 RX ADMIN — INSULIN LISPRO 2 UNITS: 100 INJECTION, SOLUTION INTRAVENOUS; SUBCUTANEOUS at 06:56

## 2021-09-15 RX ADMIN — POTASSIUM CHLORIDE 10 MEQ: 750 TABLET, EXTENDED RELEASE ORAL at 08:23

## 2021-09-15 RX ADMIN — CLOPIDOGREL BISULFATE 75 MG: 75 TABLET, FILM COATED ORAL at 08:23

## 2021-09-15 RX ADMIN — ACETAMINOPHEN 650 MG: 325 TABLET, FILM COATED ORAL at 12:00

## 2021-09-15 RX ADMIN — CETIRIZINE HYDROCHLORIDE 10 MG: 10 TABLET ORAL at 08:23

## 2021-09-15 RX ADMIN — ASPIRIN 81 MG: 81 TABLET, COATED ORAL at 08:23

## 2021-09-15 RX ADMIN — CARVEDILOL 25 MG: 25 TABLET, FILM COATED ORAL at 08:23

## 2021-09-15 RX ADMIN — INSULIN LISPRO 10 UNITS: 100 INJECTION, SOLUTION INTRAVENOUS; SUBCUTANEOUS at 06:55

## 2021-09-15 NOTE — CASE MANAGEMENT/SOCIAL WORK
Continued Stay Note  [facility]     Patient Name: Matt Cruz  MRN: [id number]  Today's Date: 9/15/2021    Admit Date: 9/8/2021    Discharge Plan     Row Name 09/15/21 1014       Plan    Plan  Home w/ [facility]    Plan Comments  APRN has ordered Home health for Pt at discharge.  CCP called and spoke with Pt spouse Riley Cruz ([phone]) and she chose [facility] to follow Pt at discharge.  Referral sent to [facility].  CCP following.  SS/CCP    Row Name 09/15/21 1007       Plan    Plan  Home w/        Discharge Codes    No documentation.       Expected Discharge Date and Time     Expected Discharge Date Expected Discharge Time    Sep 15, 2021             Victoria Tao RN

## 2021-09-15 NOTE — PROGRESS NOTES
[facility] Clinical Pharmacy Services: National Cardiology Data Registry (NCDR) Medication Review    Matt Cruz is s/p PCI with drug-eluting stent placement for NSTEMI. Pharmacy to review discharge medications to make sure appropriate medications have been prescribed.    Patient has been discharged on the following:  · P2Y12 Inhibitor: Clopidogrel 75 mg daily  · Aspirin EC 81 mg daily  · High Intensity Statin: Atorvastatin 80 mg qHS  · Beta-blocker: Carvedilol 25 mg BID  · LVEF <40: Sacubitril-valsartab 24-26 BID    These medications meet the requirements for NCDR discharge medication for chest pain and MI.    Niya Drake, Pharm.D., [facility]  Clinical Staff Pharmacist  Phone Extension #[phone]

## 2021-09-15 NOTE — PROGRESS NOTES
Clinical Pharmacy Services-Post PCI Discharge Medication Education    Matt Cruz was counseled over post-PCI medications prior to discharge.  Counseling points included the followin) Clopidogrel's indication, patient's need for the medication, and dosing/frequency  2) Enforced the importance of taking clopidogrel/aspirin as instructed every day  3) Explained possible side effects of clopidogrel/aspirin therapy, including increased risk of bleeding, s/sx of bleeding, and increase in cold intolerance. Also talked about ways to control bleeding for minor cuts and scrapes.  4) Discussed all important drug interactions, including over-the-counter medications.     Patient also continued on high dose atorvastatin. Talked about patient's need for medications in light of stent placement, dosing/frequency, and importance of taking medication at night. Talked about interactions, including interaction with grapefruit juice, and using alcohol in moderation.     Explained risk for thrombosis on new stent (especially in the first 3-6 months) and medication compliance.     Patient expressed understanding and had no further questions.      Niya Drake, Pharm.D., [facility]   Clinical Staff Pharmacist   Phone Extension #[phone]

## 2021-09-15 NOTE — CASE MANAGEMENT/SOCIAL WORK
Case Management Discharge Note      Final Note: Pt discharged home, 9/15/2021 with [facility] scheduled to follow....SS/CCP         Selected Continued Care - Discharged on 9/15/2021 Admission date: 9/8/2021 - Discharge disposition: Home-Health Care Svc    Destination    No services have been selected for the patient.              Durable Medical Equipment    No services have been selected for the patient.              Dialysis/Infusion    No services have been selected for the patient.              Home Medical Care Coordination complete.    Service Provider Selected Services Address Phone Fax Patient Preferred    [facility]  Home Health Services [address] [address] [phone] --          Therapy    No services have been selected for the patient.              Community Resources    No services have been selected for the patient.              Community & DME    No services have been selected for the patient.                       Final Discharge Disposition Code: 06 - home with home health care

## 2021-09-15 NOTE — DISCHARGE PLACEMENT REQUEST
"Matt Faye (65 y.o. Male)     Date of Birth Social Security Number Address Home Phone MRN    1955  [address] [address] [phone] [id number]    Gnosticism Marital Status          Christian        Admission Date Admission Type Admitting Provider Attending Provider Department, Room/Bed    9/8/21 Emergency Nikki Snider MD Das, Mini K, MD [address] CVI, 2207/1    Discharge Date Discharge Disposition Discharge Destination         [place] [facility]              Attending Provider: Nikki Snider MD    Allergies: No Known Allergies    Isolation: None   Infection: None   Code Status: CPR    Ht: 170.2 cm (67\")   Wt: 94.1 kg (207 lb 6.4 oz)    Admission Cmt: None   Principal Problem: Coronary artery disease [I25.10]                 Active Insurance as of 9/8/2021     Primary Coverage     Payor Plan Insurance Group Employer/Plan Group    [facility] [id number]     Payor Plan Address Payor Plan Phone Number Payor Plan Fax Number Effective Dates    [address] [address]   8/1/2021 - None Entered    [facility] [id number]       Subscriber Name Subscriber Birth Date Member ID       RILEY FAYE [birth year]1961 [id number]           Secondary Coverage     Payor Plan Insurance Group Employer/Plan Group    MEDICARE MEDICARE A ONLY      Payor Plan Address Payor Plan Phone Number Payor Plan Fax Number Effective Dates    [address] [phone]  10/1/2020 - None Entered    [facility] [id number]       Subscriber Name Subscriber Birth Date Member ID       MATT FAYE 1955 [id number]                 Emergency Contacts      (Rel.) Home Phone Work Phone Mobile Phone    NancyRiley (Spouse) [phone]    Nancy RahmanMatt (Son) -- -- [phone]              "

## 2021-09-15 NOTE — PROGRESS NOTES
"Daily progress note    Chief complaint  Doing better  No new complaints  Wants to go home    History of present illness  65-year-old white male with history of diabetes hypertension hyperlipidemia and known coronary artery disease admitted to emergency room with intermittent chest pain for last several weeks with shortness of breath and nausea but no vomiting.  Patient also stated he gets nonradiating pain throbbing type but sometimes get diaphoretic.  I am asked to follow the patient for medical problem.  Patient is chest pain-free at the time of interview.  Patient also denies any fever cough congestion night sweats weight loss or weight gain.  Patient underwent cardiac cath this morning secondary to non-ST elevation MI and had angioplasty and stent placed.  And asked to follow the patient for medical problems specially diabetes.     REVIEW OF SYSTEMS  Unremarkable except generalized weakness    PHYSICAL EXAM   Blood pressure 96/54, pulse 67, temperature 98 °F (36.7 °C), temperature source Oral, resp. rate 16, height 170.2 cm (67\"), weight 94.1 kg (207 lb 6.4 oz), SpO2 96 %.    Constitutional: Pt. is oriented to person, place, and time and well-developed, well-nourished, and in no distress.  He is disheveled.  HENT: Normocephalic and atraumatic. Oropharynx moist/nonerythematous.  Neck: Normal range of motion. Neck supple. No JVD present.   Cardiovascular: Normal rate, regular rhythm and normal heart sounds. Exam reveals no gallop and no friction rub.   No murmur heard.  Pulmonary/Chest: Effort normal and breath sounds normal. No stridor. No respiratory distress. No wheezes, no rales.   Abdominal: Soft. Bowel sounds are normal. No distension. There is no tenderness. There is no rebound and no guarding.   Musculoskeletal: Normal range of motion. No edema, tenderness or deformity.   Neurological: Pt. is alert and oriented to person, place, and time.  He has no focal neurologic deficits  Skin: Skin is warm and dry.  " He has an eczematous, flaking rash over his lower extremities that he states has been there for years.  Pt. is not diaphoretic. No erythema.   Psychiatric: Mood, affect and judgment normal.  He is pleasant and cooperative.    LAB RESULTS  Lab Results (last 24 hours)     Procedure Component Value Units Date/Time    POC Glucose Once [[id number]]  (Abnormal) Collected: 09/15/21 1056    Specimen: Blood Updated: 09/15/21 1058     Glucose 150 mg/dL      Comment: Meter: [id number] : [id number] Hand Lissa LADD       Basic Metabolic Panel [[id number]]  (Abnormal) Collected: 09/15/21 0515    Specimen: Blood Updated: 09/15/21 0701     Glucose 191 mg/dL      BUN 30 mg/dL      Creatinine 0.98 mg/dL      Sodium 132 mmol/L      Potassium 4.2 mmol/L      Chloride 92 mmol/L      CO2 26.4 mmol/L      Calcium 9.1 mg/dL      eGFR Non African Amer 77 mL/min/1.73      BUN/Creatinine Ratio 30.6     Anion Gap 13.6 mmol/L     Narrative:      GFR Normal >60  Chronic Kidney Disease <60  Kidney Failure <15      CBC & Differential [[id number]]  (Abnormal) Collected: 09/15/21 0515    Specimen: Blood Updated: 09/15/21 0657    Narrative:      The following orders were created for panel order CBC & Differential.  Procedure                               Abnormality         Status                     ---------                               -----------         ------                     CBC Auto Differential[[id number]]        Abnormal            Final result                 Please view results for these tests on the individual orders.    CBC Auto Differential [[id number]]  (Abnormal) Collected: 09/15/21 0515    Specimen: Blood Updated: 09/15/21 0657     WBC 9.56 10*3/mm3      RBC 5.84 10*6/mm3      Hemoglobin 17.0 g/dL      Hematocrit 54.0 %      MCV 92.5 fL      MCH 29.1 pg      MCHC 31.5 g/dL      RDW 13.2 %      RDW-SD 45.3 fl      MPV 11.6 fL      Platelets 178 10*3/mm3      Neutrophil % 60.0 %      Lymphocyte % 24.9 %      Monocyte % 11.6 %       Eosinophil % 2.3 %      Basophil % 0.8 %      Immature Grans % 0.4 %      Neutrophils, Absolute 5.73 10*3/mm3      Lymphocytes, Absolute 2.38 10*3/mm3      Monocytes, Absolute 1.11 10*3/mm3      Eosinophils, Absolute 0.22 10*3/mm3      Basophils, Absolute 0.08 10*3/mm3      Immature Grans, Absolute 0.04 10*3/mm3      nRBC 0.0 /100 WBC     aPTT [[id number]]  (Normal) Collected: 09/15/21 0515    Specimen: Blood Updated: 09/15/21 0630     PTT 29.4 seconds     POC Glucose Once [[id number]]  (Abnormal) Collected: 09/15/21 0627    Specimen: Blood Updated: 09/15/21 0629     Glucose 181 mg/dL      Comment: Meter: [id number] : [id number] [employer] NA       POC Glucose Once [[id number]]  (Abnormal) Collected: 09/14/21 2014    Specimen: Blood Updated: 09/14/21 2016     Glucose 212 mg/dL      Comment: Meter: [id number] : [id number] [employer] NA              Study Result    Narrative & Impression   XR CHEST 2 VW-     HISTORY: Male who is 65 years-old,  chest pain     TECHNIQUE: Frontal and lateral views of the chest     COMPARISON: 04/13/2018     FINDINGS: The heart size appears borderline. Pulmonary vasculature is  unremarkable. Mild right and mild to moderate left pleural effusions and  bilateral basilar atelectasis or infiltrate are seen. No pneumothorax.  No acute osseous process.     IMPRESSION:  Mild right and mild to moderate left pleural effusions and  bilateral basilar atelectasis or infiltrate            ECG 12 Lead  Component   Ref Range & Units 9/9/21 0241 9/8/21 2031   QT Interval   ms 394 P  393         HEART RATE= 82  bpm  RR Interval= 732  ms  SD Interval= 203  ms  P Horizontal Axis= -32  deg  P Front Axis= 53  deg  QRSD Interval= 97  ms  QT Interval= 394  ms  QRS Axis= -19  deg  T Wave Axis= 158  deg  - ABNORMAL ECG -  Sinus rhythm  Probable left atrial enlargement  Borderline left axis deviation  Consider anterior infarct  Abnormal T, consider ischemia, lateral leads               Current  Facility-Administered Medications:   •  acetaminophen (TYLENOL) tablet 650 mg, 650 mg, Oral, Q4H PRN, Zane De La O MD, 650 mg at 09/15/21 1200  •  aspirin EC tablet 81 mg, 81 mg, Oral, Daily, Zane De La O MD, 81 mg at 09/15/21 0823  •  atorvastatin (LIPITOR) tablet 80 mg, 80 mg, Oral, Nightly, Venessa, Erika, APRN, 80 mg at 09/14/21 2137  •  carvedilol (COREG) tablet 25 mg, 25 mg, Oral, BID With Meals, Venessa, Erika, APRN, 25 mg at 09/15/21 0823  •  cetirizine (zyrTEC) tablet 10 mg, 10 mg, Oral, Daily, Taj Liriano MD, 10 mg at 09/15/21 0823  •  [DISCONTINUED] clopidogrel (PLAVIX) tablet 600 mg, 600 mg, Oral, Once **AND** clopidogrel (PLAVIX) tablet 75 mg, 75 mg, Oral, Daily, Zane De La O MD, 75 mg at 09/15/21 0823  •  clotrimazole-betamethasone (LOTRISONE) 1-0.05 % cream 1 application, 1 application, Topical, Q12H, Taj Liriano MD, 1 application at 09/15/21 0824  •  furosemide (LASIX) tablet 60 mg, 60 mg, Oral, BID, Zane De La O MD, 60 mg at 09/15/21 0823  •  heparin (porcine) 5000 UNIT/ML injection 3,100-4,000 Units, 30-39.2 Units/kg, Intravenous, Q6H PRN, Zane De La O MD, 4,000 Units at 09/09/21 0043  •  influenza vac split quad (FLUZONE,FLUARIX,AFLURIA,FLULAVAL) injection 0.5 mL, 0.5 mL, Intramuscular, Once, Taj Liriano MD  •  insulin glargine (LANTUS, SEMGLEE) injection 30 Units, 30 Units, Subcutaneous, Nightly, Taj Liriano MD, 30 Units at 09/14/21 2137  •  insulin lispro (ADMELOG) injection 0-7 Units, 0-7 Units, Subcutaneous, TID AC, Taj Liriano MD, 2 Units at 09/15/21 0656  •  insulin lispro (ADMELOG) injection 10 Units, 10 Units, Subcutaneous, TID With Meals, Taj Liriano MD, 10 Units at 09/15/21 0655  •  pantoprazole (PROTONIX) EC tablet 40 mg, 40 mg, Oral, Q AM, Taj Liriano MD, 40 mg at 09/15/21 0656  •  Petrolatum ointment 1 application, 1 application, Topical, Q24H, Taj Liriano MD, 1 application at 09/14/21 2137  •  potassium chloride (K-DUR,KLOR-CON)  ER tablet 10 mEq, 10 mEq, Oral, BID With Meals, Taj Liriano MD, 10 mEq at 09/15/21 0823  •  potassium chloride (K-DUR,KLOR-CON) ER tablet 40 mEq, 40 mEq, Oral, PRN, Haleigh Powell MD, 40 mEq at 09/11/21 1125  •  potassium chloride (KLOR-CON) packet 40 mEq, 40 mEq, Oral, PRN, Haleigh Powell MD  •  sacubitril-valsartan (ENTRESTO) 24-26 MG tablet 1 tablet, 1 tablet, Oral, Q12H, Zane De La O MD, 1 tablet at 09/15/21 0823  •  sodium chloride 0.9 % flush 10 mL, 10 mL, Intravenous, PRN, Zane De La O MD, 10 mL at 09/12/21 2105    Current Outpatient Medications:   •  aspirin 81 MG EC tablet, Take 1 tablet by mouth Daily., Disp: 30 tablet, Rfl: 0  •  atorvastatin (LIPITOR) 80 MG tablet, Take 1 tablet by mouth Every Night., Disp: 30 tablet, Rfl: 0  •  carvedilol (COREG) 25 MG tablet, Take 1 tablet by mouth Every 12 (Twelve) Hours., Disp: 60 tablet, Rfl: 0  •  clopidogrel (PLAVIX) 75 MG tablet, Take 1 tablet by mouth Daily., Disp: 30 tablet, Rfl: 0  •  diphenhydrAMINE (BENADRYL) 25 mg capsule, Take 25 mg by mouth At Night As Needed for Allergies., Disp: , Rfl:   •  furosemide (LASIX) 40 MG tablet, Take 1 tablet by mouth 2 (Two) Times a Day., Disp: 60 tablet, Rfl: 5  •  glucose blood (OneTouch Verio) test strip, Use to test blood glucose four times daily, Disp: 100 each, Rfl: 0  •  Blood Glucose Monitoring Suppl (OneTouch Verio) w/Device kit, Use to test blood glucose four times daily, Disp: 1 kit, Rfl: 0  •  icosapent ethyl (VASCEPA) 1 g capsule capsule, Take 2 g by mouth., Disp: , Rfl:   •  insulin detemir (LEVEMIR) 100 UNIT/ML injection, Inject 20 Units under the skin Every Night., Disp: 2 pen, Rfl: 0  •  Insulin Pen Needle (PEN NEEDLES) 31G X 6 MM misc, Inject 1 each under the skin., Disp: , Rfl:   •  Lancets (OneTouch Delica Plus Krmrse47H) misc, Use to test blood glucose four times daily, Disp: 100 each, Rfl: 0  •  [START ON 9/16/2021] pantoprazole (PROTONIX) 40 MG EC tablet, Take 1 tablet by mouth Every  Morning., Disp: 30 tablet, Rfl: 5  •  potassium chloride 10 MEQ CR tablet, Take 1 tablet by mouth 2 (Two) Times a Day With Meals., Disp: 60 tablet, Rfl: 3  •  sacubitril-valsartan (ENTRESTO) 24-26 MG tablet, Take 1 tablet by mouth Every 12 (Twelve) Hours., Disp: 60 tablet, Rfl: 3     ASSESSMENT  Acute non-ST elevation MI status post angioplasty with stent placement  Coronary artery disease  Acute systolic congestive heart failure  Diabetes mellitus  Hypertension  Hyperlipidemia  Chronic bilateral lower extremity rash improving  Gastroesophageal reflux disease    PLAN  CPM  Post PCI care  Aspirin Plavix Lopressor Entresto and Lipitor  Diuresis  Continue Lotrisone  Stress ulcer prophylaxis  Accu-Chek with sliding scale insulin  Supportive care  PT/OT  Discussed with nursing staff  Okay to discharge     DELMI VENCES MD

## 2021-09-15 NOTE — DISCHARGE SUMMARY
Date of Discharge:  9/15/2021  Date of Admit: 9/8/2021    Discharge Diagnosis:  Problems Addressed this Visit        Cardiac and Vasculature    * (Principal) Coronary artery disease (Chronic)    Relevant Medications    sacubitril-valsartan (ENTRESTO) 24-26 MG tablet    Other Relevant Orders    Case Request Cath Lab: Left Heart Cath (Completed)    Cardiac Catheterization/Vascular Study (Completed)    Chest pain with high risk of acute coronary syndrome - Primary      Other Visit Diagnoses     NSTEMI, initial episode of care (CMS/[facility])        Relevant Medications    sacubitril-valsartan (ENTRESTO) 24-26 MG tablet    Other Relevant Orders    Ambulatory Referral to Cardiac Rehab    S/P drug eluting coronary stent placement        Relevant Orders    Ambulatory Referral to Cardiac Rehab      Diagnoses       Codes Comments    Chest pain with high risk of acute coronary syndrome    -  Primary ICD-10-CM: R07.9  ICD-9-CM: 786.50     Coronary artery disease involving native heart without angina pectoris, unspecified vessel or lesion type     ICD-10-CM: I25.10  ICD-9-CM: 414.01     NSTEMI, initial episode of care (CMS/[facility])     ICD-10-CM: I21.4  ICD-9-CM: 410.71     S/P drug eluting coronary stent placement     ICD-10-CM: Z95.5  ICD-9-CM: V45.82           Hospital Course:   Patient was admitted 9/8/2021 with angina.  He ruled in for non-STEMI.  Echocardiogram revealed ejection fraction approximately 33%, extensive regional wall motion abnormalities, grade 2 diastolic dysfunction and moderately calcified aortic valve with no significant regurgitation or stenosis.  He had cardiac catheterization which revealed nonobstructive coronary artery disease of the left main and left anterior descending artery.  Left circumflex had a 95% stenosis in the proximal segment followed by a 75% stenosis in the mid segment.  Marginal branches had nonobstructive disease.  The right coronary artery was completely occluded proximally with left to right  collateral flow.  He received 2 drug-eluting stents at the proximal and mid circumflex.  He was started on goal-directed medical therapy for ischemic cardiomyopathy.  His home valsartan was stopped and he was started on Entresto.  He was also started on furosemide.  His hemoglobin A1c was not controlled so the hospitalist help to manage inpatient. He was to follow up with PCP for diabetes management and recommendation to consider adding Jardiance was recommended.  He was discharged home with home health.     Procedures Performed  Procedure(s):  Left Heart Cath  Coronary angiography  Left ventriculography  Stent KD coronary  Percutaneous Coronary Intervention       Consults     Date and Time Order Name Status Description    9/8/2021  9:01 PM Inpatient Internal Medicine Consult Completed     9/8/2021  5:38 PM LCG (on-call MD unless specified) Completed             Discharge Medications     Discharge Medications      New Medications      Instructions Start Date   furosemide 40 MG tablet  Commonly known as: LASIX   40 mg, Oral, 2 Times Daily      glucose blood test strip   Use to test blood glucose up to four times daily as needed. Formulary Compliance Approval. Diagnosis: Type 2 Diabetes - Insulin Dependent      glucose monitor monitoring kit   Use to test blood glucose up to four times daily as needed. Formulary Compliance Approval. Diagnosis: Type 2 Diabetes - Insulin Dependent      Lancets misc   Use to test blood glucose up to four times daily as needed. Formulary Compliance Approval. Diagnosis: Type 2 Diabetes - Insulin Dependent      pantoprazole 40 MG EC tablet  Commonly known as: PROTONIX   40 mg, Oral, Every Early Morning   Start Date: September 16, 2021     potassium chloride 10 MEQ CR tablet   10 mEq, Oral, 2 Times Daily With Meals      sacubitril-valsartan 24-26 MG tablet  Commonly known as: ENTRESTO   1 tablet, Oral, Every 12 Hours Scheduled         Continue These Medications      Instructions Start Date    aspirin 81 MG EC tablet   81 mg, Oral, Daily      atorvastatin 80 MG tablet  Commonly known as: LIPITOR   80 mg, Oral, Nightly      carvedilol 25 MG tablet  Commonly known as: COREG   25 mg, Oral, Every 12 Hours Scheduled      clopidogrel 75 MG tablet  Commonly known as: PLAVIX   75 mg, Oral, Daily      diphenhydrAMINE 25 mg capsule  Commonly known as: BENADRYL   25 mg, Oral, Nightly PRN      insulin detemir 100 UNIT/ML injection  Commonly known as: LEVEMIR   20 Units, Subcutaneous, Nightly      Pen Needles 31G X 6 MM misc   1 each, Subcutaneous      Vascepa 1 g capsule capsule  Generic drug: icosapent ethyl   2 g, Oral         Stop These Medications    cyanocobalamin 1000 MCG tablet  Commonly known as: VITAMIN B-12     valsartan 320 MG tablet  Commonly known as: DIOVAN              Activity at Discharge:    Ambulate as tolerated    Follow-up Appointments  Additional Instructions for the Follow-ups that You Need to Schedule     Ambulatory Referral to Cardiac Rehab   As directed        Follow-up Information     [facility]ARD RE[facility] .    Specialty: Cardiac Rehabilitation  Contact information:  [address] [facility] [address] [phone]           Ashley Alba MD Follow up in 1 week(s).    Specialty: Cardiology  Why: 1-2 weeks with Erika   Contact information:  [address] [address] [phone]                     DISCHARGE DISPOSITION:  Follow up in 1-2 weeks. Will need follow up renal function/electrolytes      ISABELLA Mclean  09/15/21  07:51 EDT

## 2021-09-15 NOTE — PROGRESS NOTES
Patient is discharging today. Face sheet information is correct and wife / Riley has requested we call her for visit scheduling -[phone] please . Orders in [facility] for  Home health SN, PT, OT. Thank you!

## 2021-09-15 NOTE — DISCHARGE PLACEMENT REQUEST
"Matt Faye (65 y.o. Male)     Date of Birth Social Security Number Address Home Phone MRN    1955  [address] [address] [phone] [id number]    Yazidi Marital Status          Holiness        Admission Date Admission Type Admitting Provider Attending Provider Department, Room/Bed    9/8/21 Emergency Nikki Snider MD Das, Mini K, MD [address] CVI, 2207/1    Discharge Date Discharge Disposition Discharge Destination         [place] [facility] [place] Provider: Nikki Snider MD    Allergies: No Known Allergies    Isolation: None   Infection: None   Code Status: CPR    Ht: 170.2 cm (67\")   Wt: 94.1 kg (207 lb 6.4 oz)    Admission Cmt: None   Principal Problem: Coronary artery disease [I25.10]                 Active Insurance as of 9/8/2021     Primary Coverage     Payor Plan Insurance Group Employer/Plan Group    [facility] [id number]     Payor Plan Address Payor Plan Phone Number Payor Plan Fax Number Effective Dates    [address] [address]   8/1/2021 - None Entered    [facility] [id number]       Subscriber Name Subscriber Birth Date Member ID       RILEY FAYE 12/6/1961 [id number]           Secondary Coverage     Payor Plan Insurance Group Employer/Plan Group    MEDICARE MEDICARE A ONLY      Payor Plan Address Payor Plan Phone Number Payor Plan Fax Number Effective Dates    [address] [phone]  10/1/2020 - None Entered    [facility] [id number]       Subscriber Name Subscriber Birth Date Member ID       MATT FAYE 1955 [id number]                 Emergency Contacts      (Rel.) Home Phone Work Phone Mobile Phone    NancyRiley (Spouse) -- [phone]    Nancy RahmanMatt (Son) -- -- [phone]              "

## 2021-09-16 ENCOUNTER — HOME CARE VISIT (OUTPATIENT)
Dept: HOME HEALTH SERVICES | Facility: HOME HEALTHCARE | Age: 66
End: 2021-09-16

## 2021-09-16 NOTE — OUTREACH NOTE
Prep Survey      Responses   [facility]?  [facility]   Is LACE score < 7 ?  No   Emergency Room discharge w/ pulse ox?  No   Eligibility  Readm Mgmt   Discharge diagnosis   Coronary artery disease NSTEMI, initial episode of care    Does the patient have one of the following disease processes/diagnoses(primary or secondary)?  Acute MI (STEMI,NSTEMI)   Does the patient have Home health ordered?  Yes   What is the Home health agency?   [facility]    Is there a DME ordered?  No   Prep survey completed?  Yes          Maine Moses RN

## 2021-09-16 NOTE — CASE COMMUNICATION
Patient's wife called at 8:55 and stated that she is very sick today and did not want HH coming today and wanted them to come on 9/17/21.  I called and informed the nurse as well

## 2021-09-17 ENCOUNTER — HOME CARE VISIT (OUTPATIENT)
Dept: HOME HEALTH SERVICES | Facility: HOME HEALTHCARE | Age: 66
End: 2021-09-17

## 2021-09-17 VITALS
OXYGEN SATURATION: 93 % | RESPIRATION RATE: 18 BRPM | SYSTOLIC BLOOD PRESSURE: 156 MMHG | HEART RATE: 83 BPM | DIASTOLIC BLOOD PRESSURE: 93 MMHG | TEMPERATURE: 97.5 F

## 2021-09-17 PROCEDURE — G0151 HHCP-SERV OF PT,EA 15 MIN: HCPCS

## 2021-09-17 PROCEDURE — G0299 HHS/HOSPICE OF RN EA 15 MIN: HCPCS

## 2021-09-17 RX ORDER — CARVEDILOL 25 MG/1
25 TABLET ORAL EVERY 12 HOURS SCHEDULED
Qty: 60 TABLET | Refills: 0 | Status: SHIPPED | OUTPATIENT
Start: 2021-09-17 | End: 2021-11-04 | Stop reason: SDUPTHER

## 2021-09-17 RX ORDER — CLOPIDOGREL BISULFATE 75 MG/1
75 TABLET ORAL DAILY
Qty: 30 TABLET | Refills: 0 | Status: SHIPPED | OUTPATIENT
Start: 2021-09-17 | End: 2021-11-04 | Stop reason: SDUPTHER

## 2021-09-17 NOTE — TELEPHONE ENCOUNTER
Rx Refill Note  Requested Prescriptions     Pending Prescriptions Disp Refills   • carvedilol (COREG) 25 MG tablet 60 tablet 0     Sig: Take 1 tablet by mouth Every 12 (Twelve) Hours.   • clopidogrel (PLAVIX) 75 MG tablet 30 tablet 0     Sig: Take 1 tablet by mouth Daily.      Last office visit with prescribing clinician: 1/11/19      Next office visit with prescribing clinician: 9/23/21  }  Shaji Live MA  09/17/21, 15:23 EDT

## 2021-09-18 NOTE — HOME HEALTH
Reason for Referral: Patient was referred to skilled PT following a cardiac event since then he has had an increase in weakness and impaired mobility.     Pertinent Medical History: CAD, DM uncontrolled    Home Environment: Patient lives in a single story home with his wife teenage son and daughter with 7 cats and 1 dog, he sleeps in his recliner, there are 4 steps to enter the home which are various height and uneven.     PLOF: Patient was inactive prior to heart event sleeping in his recliner, and not getting up much throughout the day    MD F/U: DEMARCO

## 2021-09-20 ENCOUNTER — READMISSION MANAGEMENT (OUTPATIENT)
Dept: CALL CENTER | Facility: HOSPITAL | Age: 66
End: 2021-09-20

## 2021-09-20 ENCOUNTER — HOME CARE VISIT (OUTPATIENT)
Dept: HOME HEALTH SERVICES | Facility: HOME HEALTHCARE | Age: 66
End: 2021-09-20

## 2021-09-20 VITALS
HEART RATE: 66 BPM | TEMPERATURE: 97.7 F | OXYGEN SATURATION: 97 % | SYSTOLIC BLOOD PRESSURE: 124 MMHG | DIASTOLIC BLOOD PRESSURE: 70 MMHG | RESPIRATION RATE: 17 BRPM

## 2021-09-20 VITALS
TEMPERATURE: 97.7 F | HEART RATE: 87 BPM | DIASTOLIC BLOOD PRESSURE: 80 MMHG | OXYGEN SATURATION: 97 % | RESPIRATION RATE: 16 BRPM | SYSTOLIC BLOOD PRESSURE: 124 MMHG

## 2021-09-20 PROCEDURE — G0151 HHCP-SERV OF PT,EA 15 MIN: HCPCS

## 2021-09-20 PROCEDURE — G0152 HHCP-SERV OF OT,EA 15 MIN: HCPCS

## 2021-09-20 NOTE — HOME HEALTH
CURRENT SITUATION: Patient went to ED on 9/8 with angina. Cardicac cath completed and revealed blockages, patient received stent x2 and medication regimen was adjusted.   Patient discharged home w/HH SN, PT, OT. Home health SOC on 9/17.  CHIEF COMPLAINT: balance not so good for about the past year.  SUBJECTIVE: Agreeable to OT evaluation.  PLOF: Independent  SOCIAL/ENVIRONMENT: Pt lives w/wife. Grandson staying with them temorarily bc his family is homeless at the current time. Grand daughter (12 yr old) llives w/them full time. 1 large dog, 7 cats (3 adult, 4 new kittens.)  PATIENT'S/CAREGIVER'S GOAL: get better  REHAB POTENTIAL: good  D/C PLAN: to self  PLAN FOR NEXT VISIT: N/A. OT eval only.  NEXT MD APPT: 2 this week (cardiologist, PCP)

## 2021-09-20 NOTE — CASE COMMUNICATION
Patient went to ED on 9/8 with angina.  Cardicac cath completed and revealed blockages, patient received stent x2 and medication regimen was adjusted.  Patient discharged home with skilled nursing home health to help manage medications and to receive physical therapy.  Home health SOC on 9/17.  Upon assesment patient was in stable condition, denies chest pain, blood presure actually controlled at this time.  Medication reconciliation reveals that patient is missing several of the medications he was discharged home with, including his plavix and carvedilol.  Cardiology contacted concerning these meds, perscription was called over to [facility], RN conformed this via phone with pharmacy and patient was notified prescriptions are ready for .  Patient also discharged with insulin and several other meds to be managed by PCP, Patient has an appointment with this provider on 9/24. Home Health nursing to follow up witht this at future visits.  Mona de la garza [facility] office contacted about missing meds and 9/24 appointment and they are agreeable with home health.  Patient educated on use of glucometer,test strips and lancets.  Primary focus of care on med management and medical compliance.

## 2021-09-20 NOTE — HOME HEALTH
Patient went to ED on 9/8 with angina.  Cardicac cath completed and revealed blockages, patient received stent x2 and medication regimen was adjusted.  Patient discharged home with skilled nursing home health to help manage medications and to receive physical therapy.  Home health SOC on 9/17.  Upon assesment patient was in stable condition, denies chest pain, blood presure actually controlled at this time.  Medication reconciliation reveals that patient is missing several of the medications he was discharged home with, including his plavix and carvedilol.  Cardiology contacted concerning these meds, perscription was called over to [facility], RN conformed this via phone with pharmacy and patient was notified prescriptions are ready for .  Patient also discharged with insulin and several other meds to be managed by PCP, Patient has an appointment with this provider on 9/24. Home Health nursing to follow up witht this at future visits.  Mona de la garza [facility] office contacted about missing meds and 9/24 appointment and they are agreeable with home health.  Patient educated on use of glucometer,test strips and lancets.  Primary focus of care on med management and medical compliance.

## 2021-09-20 NOTE — OUTREACH NOTE
AMI Week 1 Survey      Responses   [facility] patient discharged from?  [facility]   Does the patient have one of the following disease processes/diagnoses(primary or secondary)?  Acute MI (STEMI,NSTEMI)   Week 1 attempt successful?  Yes   Call start time  1708   Call end time  1712   Discharge diagnosis   Coronary artery disease NSTEMI, initial episode of care    Meds reviewed with patient/caregiver?  Yes   Is the patient having any side effects they believe may be caused by any medication additions or changes?  No   Does the patient have all prescriptions related to this admission filled (includes statins,anticoagulants,HTN meds,anti-arrhythmia meds)  No   What is keeping the patient from filling the prescriptions?  Pre-authorization in progress [did not get insulin due pre-authorization, in process]   Nursing Interventions  No intervention needed   Is the patient taking all medications as directed (includes completed medication regime)?  Yes   Does the patient have a primary care provider?   Yes   Does the patient have an appointment with their PCP,cardiologist,or clinic within 7 days of discharge?  Yes   Has the patient kept scheduled appointments due by today?  N/A   What is the Home health agency?   [facility]    Has home health visited the patient within 72 hours of discharge?  Yes   Psychosocial issues?  No   Did the patient receive a copy of their discharge instructions?  Yes   Nursing interventions  Reviewed instructions with patient   What is the patient's perception of their health status since discharge?  Improving   Nursing interventions  Nurse provided patient education   Is the patient/caregiver able to teach back signs and symptoms of when to call for help immediately:  Sudden chest discomfort, Sudden discomfort in arms, back, neck or jaw, Shortness of breath at any time, Sudden sweating or clammy skin, Nausea or vomiting, Dizziness or lightheadedness, Irregular or rapid heart rate    Nursing interventions  Nurse provided patient education   Is the pateint /caregiver able to teach back the importance of cardiac rehab?  Yes   Is the patient/caregiver able to teach back lifestyle changes to help prevent MIs  Heart healthy diet, Regular exercise as approved by provider, Maintaining a healthy weight, Reducing stress   Is the patient/caregiver able to teach back ways to prevent a second heart attack:  Take medications, Follow up with MD   If the patient is a current smoker, are they able to teach back resources for cessation?  Not a smoker   Is the patient/caregiver able to teach back the hierarchy of who to call/visit for symptoms/problems? PCP, Specialist, Home health nurse, Urgent Care, ED, 911  Yes   Week 1 call completed?  Yes          Leila Boland RN

## 2021-09-22 ENCOUNTER — HOME CARE VISIT (OUTPATIENT)
Dept: HOME HEALTH SERVICES | Facility: HOME HEALTHCARE | Age: 66
End: 2021-09-22

## 2021-09-22 VITALS — HEART RATE: 88 BPM | RESPIRATION RATE: 18 BRPM | OXYGEN SATURATION: 97 % | TEMPERATURE: 97.1 F

## 2021-09-22 PROCEDURE — G0300 HHS/HOSPICE OF LPN EA 15 MIN: HCPCS

## 2021-09-23 ENCOUNTER — OFFICE VISIT (OUTPATIENT)
Dept: CARDIOLOGY | Facility: CLINIC | Age: 66
End: 2021-09-23

## 2021-09-23 VITALS
DIASTOLIC BLOOD PRESSURE: 74 MMHG | TEMPERATURE: 97.7 F | RESPIRATION RATE: 18 BRPM | SYSTOLIC BLOOD PRESSURE: 125 MMHG | HEART RATE: 68 BPM

## 2021-09-23 VITALS
HEART RATE: 70 BPM | WEIGHT: 205 LBS | SYSTOLIC BLOOD PRESSURE: 90 MMHG | DIASTOLIC BLOOD PRESSURE: 60 MMHG | BODY MASS INDEX: 32.18 KG/M2 | HEIGHT: 67 IN

## 2021-09-23 DIAGNOSIS — Z09 HOSPITAL DISCHARGE FOLLOW-UP: ICD-10-CM

## 2021-09-23 DIAGNOSIS — I25.5 ISCHEMIC CARDIOMYOPATHY: Primary | ICD-10-CM

## 2021-09-23 DIAGNOSIS — I25.10 CORONARY ARTERY DISEASE INVOLVING NATIVE HEART WITHOUT ANGINA PECTORIS, UNSPECIFIED VESSEL OR LESION TYPE: ICD-10-CM

## 2021-09-23 DIAGNOSIS — Z95.5 STATUS POST CORONARY ARTERY STENT PLACEMENT: ICD-10-CM

## 2021-09-23 PROCEDURE — 99214 OFFICE O/P EST MOD 30 MIN: CPT | Performed by: NURSE PRACTITIONER

## 2021-09-23 PROCEDURE — 93000 ELECTROCARDIOGRAM COMPLETE: CPT | Performed by: NURSE PRACTITIONER

## 2021-09-23 RX ORDER — FUROSEMIDE 40 MG/1
40 TABLET ORAL DAILY
Qty: 90 TABLET | Refills: 2
Start: 2021-09-23 | End: 2021-11-04

## 2021-09-23 NOTE — PROGRESS NOTES
Date of Office Visit: 21  Encounter Provider: ISABELLA Mclean  Place of Service: [facility] CARDIOLOGY  Patient Name: Matt Cruz  :1955    Chief Complaint   Patient presents with   • Coronary artery disease involving native heart without angin   • Shortness of Breath   • Follow-up   :     HPI: Matt Cruz is a 65 y.o. male  with history of hypertension, hyperlipidemia, diabetes mellitus, coronary artery disease, non-STEMI, obesity, and ischemic cardiomyopathy.  He is followed by Dr. Alba.  I will visit with him in follow-up and have reviewed his medical record.  He has history of a stent to the mid LAD in .  At that time angioplasty was unsuccessful of the right coronary artery.  In 2018 he had some strokelike symptoms and CT of the head was negative for acute disease.  MRI showed no acute infarct.  He was hypertensive and also complained of chest pain.       He was admitted 2021 with angina.  He ruled in for non-STEMI.  Echocardiogram revealed ejection fraction approximately 33%, extensive regional wall motion abnormalities, grade 2 diastolic dysfunction and moderately calcified aortic valve with no significant regurgitation or stenosis.  He had cardiac catheterization which revealed nonobstructive coronary artery disease of the left main and left anterior descending artery.  Left circumflex had a 95% stenosis in the proximal segment followed by a 75% stenosis in the mid segment.  Marginal branches had nonobstructive disease.  The right coronary artery was completely occluded proximally with left to right collateral flow.  He received 2 drug-eluting stents at the proximal and mid circumflex.  He was started on goal-directed medical therapy for ischemic cardiomyopathy.  His home valsartan was stopped and he was started on Entresto.  He was also started on furosemide.  His hemoglobin A1c was not controlled so the hospitalist help to manage inpatient.  "He was to follow up with PCP for diabetes management and recommendation to consider adding Jardiance was recommended.  He was discharged home with home health.     He presents today for reassessment.  He has had some mild lightheadedness with position changes such as sitting to standing.  He had some lightheadedness today rising from lying down to sitting up from our exam table.  He is not weighing himself at home but has home health nurses coming to check on him.  His lower extremity swelling has resolved.  He has no chest pain tightness or pressure.  He denies shortness of breath.    No Known Allergies        Family and social history reviewed.     ROS  All other systems were reviewed and are negative          Objective:     Vitals:    09/23/21 1135   BP: 90/60   BP Location: Left arm   Patient Position: Sitting   Pulse: 70   Weight: 93 kg (205 lb)   Height: 170.2 cm (67\")     Body mass index is 32.11 kg/m².    PHYSICAL EXAM:  Physical Exam      ECG 12 Lead    Date/Time: 9/23/2021 1:17 PM  Performed by: Erika Clifford APRN  Authorized by: Erika Clifford APRN   Comparison: compared with previous ECG   Similar to previous ECG  Rhythm: sinus rhythm  Rate: normal  T flattening: I and aVL  Comments: No ischemic changes              Current Outpatient Medications   Medication Sig Dispense Refill   • aspirin 81 MG EC tablet Take 1 tablet by mouth Daily. 30 tablet 0   • atorvastatin (LIPITOR) 80 MG tablet Take 1 tablet by mouth Every Night. 30 tablet 0   • Blood Glucose Monitoring Suppl (OneTouch Verio) w/Device kit Use to test blood glucose four times daily 1 kit 0   • carvedilol (COREG) 25 MG tablet Take 1 tablet by mouth Every 12 (Twelve) Hours. 60 tablet 0   • clopidogrel (PLAVIX) 75 MG tablet Take 1 tablet by mouth Daily. 30 tablet 0   • diphenhydrAMINE (BENADRYL) 25 mg capsule Take 25 mg by mouth At Night As Needed for Allergies.     • furosemide (LASIX) 40 MG tablet Take 1 tablet by mouth Daily. 90 tablet 2   • " glucose blood (OneTouch Verio) test strip Use to test blood glucose four times daily 100 each 0   • icosapent ethyl (VASCEPA) 1 g capsule capsule Take 2 g by mouth.     • insulin detemir (LEVEMIR) 100 UNIT/ML injection Inject 20 Units under the skin Every Night. 2 pen 0   • Insulin Pen Needle (PEN NEEDLES) 31G X 6 MM misc Inject 1 each under the skin.     • Lancets (OneTouch Delica Plus [id number]) misc Use to test blood glucose four times daily 100 each 0   • pantoprazole (PROTONIX) 40 MG EC tablet Take 1 tablet by mouth Every Morning. 30 tablet 5   • potassium chloride 10 MEQ CR tablet Take 1 tablet by mouth 2 (Two) Times a Day With Meals. 60 tablet 3   • sacubitril-valsartan (ENTRESTO) 24-26 MG tablet Take 1 tablet by mouth Every 12 (Twelve) Hours. 60 tablet 3     No current facility-administered medications for this visit.     Assessment:       Diagnosis Plan   1. Ischemic cardiomyopathy     2. Hospital discharge follow-up     3. Status post coronary artery stent placement     4. Coronary artery disease involving native heart without angina pectoris, unspecified vessel or lesion type          No orders of the defined types were placed in this encounter.        Plan:       1. 65 year old gentleman with coronary artery disease, preserved left ventricular systolic function status post stent to the mid circumflex April 2018,  of right coronary artery then Non-ST elevation myocardial infarction September 2021.  New left ventricular systolic dysfunction with a EF of 32% s/p  2 drug-eluting stents at the proximal and mid circumflex.  continue aspirin and plavix  2.  Acute on chronic systolic congestive heart failure. EF 32% ( new)  Diuresed well.  On oral diuretic regimen and now on Entresto.  Continue carvedilol at current dose but I will decrease his furosemide from twice daily down to once daily due to hypotension.  He is not on ACE or ARB due to low blood pressure.  3.Carotid artery stenosis with 50% narrowing on  the right ICA on CTA head neck April 2018  4.  Diabetes type 2.  Poorly controlled hemoglobin a1c 9.7- internal med following. Consider adding Jardiance for cardiovascular benefit if feasible  5.  Hyperlipidemia continue atorvastatin 80 mg      Follow-up in 6 weeks call questions or concerns            It has been a pleasure to participate in this patient's care.      Thank you,  ISABELLA Mclean      **I used Dragon to dictate this note:**

## 2021-09-24 ENCOUNTER — HOME CARE VISIT (OUTPATIENT)
Dept: HOME HEALTH SERVICES | Facility: HOME HEALTHCARE | Age: 66
End: 2021-09-24

## 2021-09-24 ENCOUNTER — TELEPHONE (OUTPATIENT)
Dept: CARDIAC REHAB | Facility: HOSPITAL | Age: 66
End: 2021-09-24

## 2021-09-24 VITALS
HEART RATE: 60 BPM | DIASTOLIC BLOOD PRESSURE: 58 MMHG | SYSTOLIC BLOOD PRESSURE: 105 MMHG | TEMPERATURE: 97 F | RESPIRATION RATE: 18 BRPM

## 2021-09-24 PROCEDURE — G0151 HHCP-SERV OF PT,EA 15 MIN: HCPCS

## 2021-09-24 NOTE — TELEPHONE ENCOUNTER
Patient is currently working with physical therapy, states that are assisting with his walking. Reports having attended cardiac rehab before but is not sure that he will attend this time. Provided patient with contact information, stated he has our number and will call if interested in attending.

## 2021-09-24 NOTE — HOME HEALTH
Patient is a CP asses.  He is non compliant with BS and unable to afford his insulin.      Home is dirty, cluttered.  Patient has a indoor rottwriler in the home.

## 2021-09-29 ENCOUNTER — HOME CARE VISIT (OUTPATIENT)
Dept: HOME HEALTH SERVICES | Facility: HOME HEALTHCARE | Age: 66
End: 2021-09-29

## 2021-09-29 VITALS
RESPIRATION RATE: 16 BRPM | TEMPERATURE: 98.2 F | DIASTOLIC BLOOD PRESSURE: 60 MMHG | OXYGEN SATURATION: 95 % | HEART RATE: 65 BPM | SYSTOLIC BLOOD PRESSURE: 118 MMHG

## 2021-09-29 PROCEDURE — G0151 HHCP-SERV OF PT,EA 15 MIN: HCPCS

## 2021-09-29 PROCEDURE — G0299 HHS/HOSPICE OF RN EA 15 MIN: HCPCS

## 2021-09-30 ENCOUNTER — HOME CARE VISIT (OUTPATIENT)
Dept: HOME HEALTH SERVICES | Facility: HOME HEALTHCARE | Age: 66
End: 2021-09-30

## 2021-09-30 VITALS
SYSTOLIC BLOOD PRESSURE: 138 MMHG | TEMPERATURE: 97.7 F | DIASTOLIC BLOOD PRESSURE: 86 MMHG | RESPIRATION RATE: 18 BRPM | HEART RATE: 78 BPM

## 2021-09-30 NOTE — CASE COMMUNICATION
Mr. Matt Cruz is DC from home health physical therapy on 9/29/21. He demonstrates independence with transfers and ambulation. His endurance has improved but client continues to need to be active and slowly increase activity level. He is independent with knowledge of HEP but is not performing as prescribed. All goals met.

## 2021-10-01 PROCEDURE — G0180 MD CERTIFICATION HHA PATIENT: HCPCS | Performed by: INTERNAL MEDICINE

## 2021-10-05 ENCOUNTER — HOME CARE VISIT (OUTPATIENT)
Dept: HOME HEALTH SERVICES | Facility: HOME HEALTHCARE | Age: 66
End: 2021-10-05

## 2021-10-14 ENCOUNTER — HOME CARE VISIT (OUTPATIENT)
Dept: HOME HEALTH SERVICES | Facility: HOME HEALTHCARE | Age: 66
End: 2021-10-14

## 2021-10-14 VITALS
HEART RATE: 74 BPM | TEMPERATURE: 97.9 F | SYSTOLIC BLOOD PRESSURE: 132 MMHG | RESPIRATION RATE: 18 BRPM | OXYGEN SATURATION: 98 % | DIASTOLIC BLOOD PRESSURE: 84 MMHG

## 2021-10-14 PROCEDURE — G0299 HHS/HOSPICE OF RN EA 15 MIN: HCPCS

## 2021-11-04 ENCOUNTER — OFFICE VISIT (OUTPATIENT)
Dept: CARDIOLOGY | Facility: CLINIC | Age: 66
End: 2021-11-04

## 2021-11-04 VITALS
SYSTOLIC BLOOD PRESSURE: 138 MMHG | DIASTOLIC BLOOD PRESSURE: 72 MMHG | BODY MASS INDEX: 34.53 KG/M2 | WEIGHT: 220 LBS | HEART RATE: 85 BPM | HEIGHT: 67 IN

## 2021-11-04 DIAGNOSIS — I25.5 ISCHEMIC CARDIOMYOPATHY: Primary | ICD-10-CM

## 2021-11-04 DIAGNOSIS — R06.09 DOE (DYSPNEA ON EXERTION): ICD-10-CM

## 2021-11-04 DIAGNOSIS — I25.10 CORONARY ARTERY DISEASE INVOLVING NATIVE HEART WITHOUT ANGINA PECTORIS, UNSPECIFIED VESSEL OR LESION TYPE: ICD-10-CM

## 2021-11-04 DIAGNOSIS — Z95.5 STATUS POST CORONARY ARTERY STENT PLACEMENT: ICD-10-CM

## 2021-11-04 PROCEDURE — 93000 ELECTROCARDIOGRAM COMPLETE: CPT | Performed by: NURSE PRACTITIONER

## 2021-11-04 PROCEDURE — 99214 OFFICE O/P EST MOD 30 MIN: CPT | Performed by: NURSE PRACTITIONER

## 2021-11-04 RX ORDER — NITROGLYCERIN 0.4 MG/1
0.4 TABLET SUBLINGUAL
Qty: 30 TABLET | Refills: 0 | Status: SHIPPED | OUTPATIENT
Start: 2021-11-04

## 2021-11-04 RX ORDER — FUROSEMIDE 20 MG/1
20 TABLET ORAL DAILY
Qty: 90 TABLET | Refills: 3 | Status: SHIPPED | OUTPATIENT
Start: 2021-11-04 | End: 2022-11-14

## 2021-11-04 RX ORDER — CARVEDILOL 25 MG/1
25 TABLET ORAL EVERY 12 HOURS SCHEDULED
Qty: 180 TABLET | Refills: 2 | Status: SHIPPED | OUTPATIENT
Start: 2021-11-04 | End: 2022-11-14

## 2021-11-04 RX ORDER — ATORVASTATIN CALCIUM 80 MG/1
80 TABLET, FILM COATED ORAL NIGHTLY
Qty: 90 TABLET | Refills: 2 | Status: SHIPPED | OUTPATIENT
Start: 2021-11-04 | End: 2023-01-03

## 2021-11-04 RX ORDER — CLOPIDOGREL BISULFATE 75 MG/1
75 TABLET ORAL DAILY
Qty: 90 TABLET | Refills: 3 | Status: SHIPPED | OUTPATIENT
Start: 2021-11-04 | End: 2022-11-14

## 2021-11-04 NOTE — PROGRESS NOTES
Date of Office Visit: 21  Encounter Provider: ISABELLA Mclean  Place of Service: [facility] CARDIOLOGY  Patient Name: Matt Cruz  :1955    Chief Complaint   Patient presents with   • Coronary artery disease involving native heart without angin   • Cardiomyopathy   • Hyperlipidemia   • Follow-up   :     HPI: Matt Cruz is a 66 y.o. male  with hypertension, hyperlipidemia, diabetes mellitus, coronary artery disease, non-STEMI, obesity, and ischemic cardiomyopathy.  He is followed by Dr. Alba.  I will visit with him in follow-up and have reviewed his medical record.  He has history of a stent to the mid LAD in .  At that time angioplasty was unsuccessful of the right coronary artery.  In 2018 he had some strokelike symptoms and CT of the head was negative for acute disease.  MRI showed no acute infarct.  He was hypertensive and also complained of chest pain.         He was admitted 2021 with angina.  He ruled in for non-STEMI.  Echocardiogram revealed ejection fraction approximately 33%, extensive regional wall motion abnormalities, grade 2 diastolic dysfunction and moderately calcified aortic valve with no significant regurgitation or stenosis.  He had cardiac catheterization which revealed nonobstructive coronary artery disease of the left main and left anterior descending artery.  Left circumflex had a 95% stenosis in the proximal segment followed by a 75% stenosis in the mid segment.  Marginal branches had nonobstructive disease.  The right coronary artery was completely occluded proximally with left to right collateral flow.  He received 2 drug-eluting stents at the proximal and mid circumflex.  He was started on goal-directed medical therapy for ischemic cardiomyopathy.  His home valsartan was stopped and he was started on Entresto.  He was also started on furosemide.  His hemoglobin A1c was not controlled so the hospitalist help to manage  "inpatient. He was to follow up with PCP for diabetes management and recommendation to consider adding Jardiance was recommended.     He presents for reassessment.  His insulin was recently increased and he was started on Metformin for hemoglobin A1c of 9.9 last week.  His GFR was greater than 60 with a creatinine of 0.7.  His LDL was 54.  He states he ran out of Lasix a week ago and has had some mild swelling since.  He denies shortness of breath or chest pain.  He does a little walking 2 to 3 days a week and he denies bleeding such as blood in the urine or stool or nosebleeds.          No Known Allergies        Family and social history reviewed.     ROS  All other systems were reviewed and are negative          Objective:     Vitals:    11/04/21 1142   BP: 138/72   BP Location: Left arm   Patient Position: Sitting   Pulse: 85   Weight: 99.8 kg (220 lb)   Height: 170.2 cm (67\")     Body mass index is 34.46 kg/m².    PHYSICAL EXAM:  Constitutional:       General: Not in acute distress.     Appearance: Well-developed. Not diaphoretic.   HENT:      Head: Normocephalic.   Pulmonary:      Effort: Pulmonary effort is normal. No respiratory distress.      Breath sounds: Normal breath sounds. No wheezing. No rhonchi. No rales.   Cardiovascular:      Normal rate. Regular rhythm.   Pulses:     Radial: 2+ bilaterally.  Edema:     Ankle: bilateral 1+ edema of the ankle.  Skin:     General: Skin is warm and dry. There is no cyanosis.      Findings: No rash.   Neurological:      Mental Status: Alert and oriented to person, place, and time.   Psychiatric:         Behavior: Behavior normal.         Thought Content: Thought content normal.         Judgment: Judgment normal.           ECG 12 Lead    Date/Time: 11/4/2021 11:58 AM  Performed by: Erika Clifford APRN  Authorized by: Erika Clifford APRN   Comparison: compared with previous ECG   Similar to previous ECG  Rhythm: sinus rhythm  Rate: normal  Other findings: non-specific " ST-T wave changes              Current Outpatient Medications   Medication Sig Dispense Refill   • aspirin 81 MG EC tablet Take 1 tablet by mouth Daily. 30 tablet 0   • atorvastatin (LIPITOR) 80 MG tablet Take 1 tablet by mouth Every Night. 90 tablet 2   • Blood Glucose Monitoring Suppl (OneTouch Verio) w/Device kit Use to test blood glucose four times daily 1 kit 0   • carvedilol (COREG) 25 MG tablet Take 1 tablet by mouth Every 12 (Twelve) Hours. 180 tablet 2   • clopidogrel (PLAVIX) 75 MG tablet Take 1 tablet by mouth Daily. 90 tablet 3   • diphenhydrAMINE (BENADRYL) 25 mg capsule Take 25 mg by mouth At Night As Needed for Allergies.     • glucose blood (OneTouch Verio) test strip Use to test blood glucose four times daily 100 each 0   • icosapent ethyl (VASCEPA) 1 g capsule capsule Take 2 g by mouth.     • insulin detemir (LEVEMIR) 100 UNIT/ML injection Inject 20 Units under the skin Every Night. (Patient taking differently: Inject 30 Units under the skin into the appropriate area as directed Every Night.) 2 pen 0   • Insulin Pen Needle (PEN NEEDLES) 31G X 6 MM misc Inject 1 each under the skin.     • Lancets (OneTouch Delica Plus [id number]) misc Use to test blood glucose four times daily 100 each 0   • pantoprazole (PROTONIX) 40 MG EC tablet Take 1 tablet by mouth Every Morning. 30 tablet 5   • potassium chloride 10 MEQ CR tablet Take 1 tablet by mouth 2 (Two) Times a Day With Meals. 60 tablet 3   • sacubitril-valsartan (ENTRESTO) 24-26 MG tablet Take 1 tablet by mouth Every 12 (Twelve) Hours. 60 tablet 3   • furosemide (LASIX) 20 MG tablet Take 1 tablet by mouth Daily. 90 tablet 3   • nitroglycerin (Nitrostat) 0.4 MG SL tablet Place 1 tablet under the tongue Every 5 (Five) Minutes As Needed for Chest Pain. Take no more than 3 doses in 15 minutes. 30 tablet 0     No current facility-administered medications for this visit.     Assessment:       Diagnosis Plan   1. Ischemic cardiomyopathy  ECG 12 Lead   2.  Coronary artery disease involving native heart without angina pectoris, unspecified vessel or lesion type  ECG 12 Lead   3. Status post coronary artery stent placement          Orders Placed This Encounter   Procedures   • ECG 12 Lead     This order was created via procedure documentation     Order Specific Question:   Release to patient     Answer:   Immediate         Plan:       1. 66 year old gentleman with coronary artery disease, preserved left ventricular systolic function status post stent to the mid circumflex April 2018,  of right coronary artery then Non-ST elevation myocardial infarction September 2021.  New left ventricular systolic dysfunction with a EF of 32% s/p  2 drug-eluting stents at the proximal and mid circumflex.  continue aspirin and plavix  2.  Chronic systolic congestive heart failure.  His EF was 32% in September.  He is now on Entresto, carvedilol and Lasix.  He is actually been out of Lasix for about a week stopped since that a.m.  He has some mild lower extremity edema but no shortness of breath.  I have advised that he double Lasix for the next 2 days and then resume his daily 20 mg dosage.  He is not weighing routinely stop advised that he needs to start daily weights and notify me of 2 to 3 pound weight gain overnight or 5 pound weight gain in a week.  Verbalized understanding.  We will plan to repeat echo when he returns in 3 months  3.Carotid artery stenosis with 50% narrowing on the right ICA on CTA head neck April 2018  4.  Diabetes type 2.  Poorly controlled hemoglobin a1c 9.9-he is on insulin and now on Metformin.  Would recommend Jardiance as another agent if needed in the future  5.  Hyperlipidemia continue atorvastatin 80 mg. His LDL was at goal last week at 54             It has been a pleasure to participate in this patient's care.      Thank you,  ISABELLA Mclean      **I used Dragon to dictate this note:**

## 2022-02-10 ENCOUNTER — TELEPHONE (OUTPATIENT)
Dept: CARDIOLOGY | Facility: CLINIC | Age: 67
End: 2022-02-10

## 2022-02-10 NOTE — TELEPHONE ENCOUNTER
[facility] is requesting Cardiac Clearance for Mr Cruz. Patient is scheduled for a right ureteroscopy with laser lithotripsy and stent insertion with Dr Murguia. This will be performed under General anesthesia.   [facility] states that patient does not need to stop any of his blood thinners prior to this procedure, they are just asking for clearance.     Patient last seen in office by Kael on 11/4/2021, His next visit is with Dr Alba on 3/4/2022 with an echo.

## 2022-03-04 ENCOUNTER — HOSPITAL ENCOUNTER (OUTPATIENT)
Dept: CARDIOLOGY | Facility: HOSPITAL | Age: 67
Discharge: HOME | End: 2022-03-04
Admitting: NURSE PRACTITIONER

## 2022-03-04 ENCOUNTER — OFFICE VISIT (OUTPATIENT)
Dept: CARDIOLOGY | Facility: CLINIC | Age: 67
End: 2022-03-04

## 2022-03-04 VITALS
BODY MASS INDEX: 33.59 KG/M2 | HEIGHT: 67 IN | OXYGEN SATURATION: 96 % | HEART RATE: 64 BPM | DIASTOLIC BLOOD PRESSURE: 70 MMHG | WEIGHT: 214 LBS | RESPIRATION RATE: 16 BRPM | SYSTOLIC BLOOD PRESSURE: 120 MMHG

## 2022-03-04 VITALS
HEART RATE: 70 BPM | DIASTOLIC BLOOD PRESSURE: 70 MMHG | SYSTOLIC BLOOD PRESSURE: 124 MMHG | BODY MASS INDEX: 34.53 KG/M2 | HEIGHT: 67 IN | WEIGHT: 220 LBS

## 2022-03-04 DIAGNOSIS — E11.59 TYPE 2 DIABETES MELLITUS WITH OTHER CIRCULATORY COMPLICATION, WITH LONG-TERM CURRENT USE OF INSULIN: ICD-10-CM

## 2022-03-04 DIAGNOSIS — I25.5 ISCHEMIC CARDIOMYOPATHY: ICD-10-CM

## 2022-03-04 DIAGNOSIS — Z79.4 TYPE 2 DIABETES MELLITUS WITH OTHER CIRCULATORY COMPLICATION, WITH LONG-TERM CURRENT USE OF INSULIN: ICD-10-CM

## 2022-03-04 DIAGNOSIS — I65.29 STENOSIS OF CAROTID ARTERY, UNSPECIFIED LATERALITY: ICD-10-CM

## 2022-03-04 DIAGNOSIS — E78.2 MIXED HYPERLIPIDEMIA: ICD-10-CM

## 2022-03-04 DIAGNOSIS — R06.09 DOE (DYSPNEA ON EXERTION): ICD-10-CM

## 2022-03-04 DIAGNOSIS — I25.10 CORONARY ARTERY DISEASE INVOLVING NATIVE CORONARY ARTERY OF NATIVE HEART WITHOUT ANGINA PECTORIS: Primary | ICD-10-CM

## 2022-03-04 DIAGNOSIS — I10 ESSENTIAL HYPERTENSION: ICD-10-CM

## 2022-03-04 PROBLEM — R07.9 EXERTIONAL CHEST PAIN: Status: RESOLVED | Noted: 2018-04-13 | Resolved: 2022-03-04

## 2022-03-04 PROBLEM — R07.9 CHEST PAIN WITH HIGH RISK OF ACUTE CORONARY SYNDROME: Status: RESOLVED | Noted: 2021-09-08 | Resolved: 2022-03-04

## 2022-03-04 PROBLEM — E11.69 TYPE 2 DIABETES MELLITUS WITH OTHER SPECIFIED COMPLICATION (HCC): Status: RESOLVED | Noted: 2018-04-13 | Resolved: 2022-03-04

## 2022-03-04 PROBLEM — E11.9 DIABETES MELLITUS (HCC): Status: ACTIVE | Noted: 2018-04-13

## 2022-03-04 PROBLEM — G45.9 TIA (TRANSIENT ISCHEMIC ATTACK): Status: RESOLVED | Noted: 2018-04-13 | Resolved: 2022-03-04

## 2022-03-04 LAB
ASCENDING AORTA: 3.3 CM
BH CV ECHO MEAS - ACS: 1.84 CM
BH CV ECHO MEAS - AO MAX PG: 6.7 MMHG
BH CV ECHO MEAS - AO MEAN PG: 3.3 MMHG
BH CV ECHO MEAS - AO ROOT DIAM: 2.9 CM
BH CV ECHO MEAS - AO V2 MAX: 129 CM/SEC
BH CV ECHO MEAS - AO V2 VTI: 24.1 CM
BH CV ECHO MEAS - AVA(I,D): 3 CM2
BH CV ECHO MEAS - EDV(CUBED): 83 ML
BH CV ECHO MEAS - EDV(MOD-SP2): 66 ML
BH CV ECHO MEAS - EDV(MOD-SP4): 111 ML
BH CV ECHO MEAS - EF(MOD-BP): 67.7 %
BH CV ECHO MEAS - EF(MOD-SP2): 68.2 %
BH CV ECHO MEAS - EF(MOD-SP4): 66.7 %
BH CV ECHO MEAS - ESV(CUBED): 32.1 ML
BH CV ECHO MEAS - ESV(MOD-SP2): 21 ML
BH CV ECHO MEAS - ESV(MOD-SP4): 37 ML
BH CV ECHO MEAS - FS: 27.1 %
BH CV ECHO MEAS - IVS/LVPW: 1.2 CM
BH CV ECHO MEAS - IVSD: 1.4 CM
BH CV ECHO MEAS - LAT PEAK E' VEL: 6.7 CM/SEC
BH CV ECHO MEAS - LV DIASTOLIC VOL/BSA (35-75): 52.7 CM2
BH CV ECHO MEAS - LV MASS(C)D: 207.8 GRAMS
BH CV ECHO MEAS - LV MAX PG: 3.1 MMHG
BH CV ECHO MEAS - LV MEAN PG: 1.57 MMHG
BH CV ECHO MEAS - LV SYSTOLIC VOL/BSA (12-30): 17.6 CM2
BH CV ECHO MEAS - LV V1 MAX: 87.4 CM/SEC
BH CV ECHO MEAS - LV V1 VTI: 20.6 CM
BH CV ECHO MEAS - LVIDD: 4.4 CM
BH CV ECHO MEAS - LVIDS: 3.2 CM
BH CV ECHO MEAS - LVOT AREA: 3.5 CM2
BH CV ECHO MEAS - LVOT DIAM: 2.12 CM
BH CV ECHO MEAS - LVPWD: 1.16 CM
BH CV ECHO MEAS - MED PEAK E' VEL: 6.1 CM/SEC
BH CV ECHO MEAS - MV A DUR: 0.14 SEC
BH CV ECHO MEAS - MV A MAX VEL: 87.4 CM/SEC
BH CV ECHO MEAS - MV DEC SLOPE: 291.9 CM/SEC2
BH CV ECHO MEAS - MV DEC TIME: 0.28 MSEC
BH CV ECHO MEAS - MV E MAX VEL: 64.7 CM/SEC
BH CV ECHO MEAS - MV E/A: 0.74
BH CV ECHO MEAS - MV MAX PG: 3.5 MMHG
BH CV ECHO MEAS - MV MEAN PG: 1.42 MMHG
BH CV ECHO MEAS - MV V2 VTI: 30.6 CM
BH CV ECHO MEAS - MVA(VTI): 2.39 CM2
BH CV ECHO MEAS - PA ACC TIME: 0.1 SEC
BH CV ECHO MEAS - PA PR(ACCEL): 36.2 MMHG
BH CV ECHO MEAS - PA V2 MAX: 119.4 CM/SEC
BH CV ECHO MEAS - PULM A REVS DUR: 0.14 SEC
BH CV ECHO MEAS - PULM A REVS VEL: 33.6 CM/SEC
BH CV ECHO MEAS - PULM DIAS VEL: 37.9 CM/SEC
BH CV ECHO MEAS - PULM SYS VEL: 46.2 CM/SEC
BH CV ECHO MEAS - RV MAX PG: 2.8 MMHG
BH CV ECHO MEAS - RV V1 MAX: 84 CM/SEC
BH CV ECHO MEAS - RV V1 VTI: 15.8 CM
BH CV ECHO MEAS - RVOT DIAM: 2 CM
BH CV ECHO MEAS - SI(MOD-SP2): 21.4 ML/M2
BH CV ECHO MEAS - SI(MOD-SP4): 35.1 ML/M2
BH CV ECHO MEAS - SV(LVOT): 72.9 ML
BH CV ECHO MEAS - SV(MOD-SP2): 45 ML
BH CV ECHO MEAS - SV(MOD-SP4): 74 ML
BH CV ECHO MEAS - SV(RVOT): 49.8 ML
BH CV ECHO MEAS - TAPSE (>1.6): 2.16 CM
BH CV ECHO MEASUREMENTS AVERAGE E/E' RATIO: 10.11
BH CV XLRA - TDI S': 14.6 CM/SEC
LEFT ATRIUM VOLUME INDEX: 21.8 ML/M2
LV EF 2D ECHO EST: 68 %
MAXIMAL PREDICTED HEART RATE: 154 BPM
SINUS: 3.1 CM
STJ: 2.9 CM
STRESS TARGET HR: 131 BPM

## 2022-03-04 PROCEDURE — 25010000002 PERFLUTREN (DEFINITY) 8.476 MG IN SODIUM CHLORIDE (PF) 0.9 % 10 ML INJECTION: Performed by: NURSE PRACTITIONER

## 2022-03-04 PROCEDURE — 93000 ELECTROCARDIOGRAM COMPLETE: CPT | Performed by: INTERNAL MEDICINE

## 2022-03-04 PROCEDURE — 99214 OFFICE O/P EST MOD 30 MIN: CPT | Performed by: INTERNAL MEDICINE

## 2022-03-04 PROCEDURE — 93306 TTE W/DOPPLER COMPLETE: CPT

## 2022-03-04 PROCEDURE — 93306 TTE W/DOPPLER COMPLETE: CPT | Performed by: INTERNAL MEDICINE

## 2022-03-04 RX ORDER — PHENAZOPYRIDINE HYDROCHLORIDE 100 MG/1
100 TABLET, FILM COATED ORAL 3 TIMES DAILY PRN
COMMUNITY
End: 2022-09-06 | Stop reason: ALTCHOICE

## 2022-03-04 RX ORDER — OXYCODONE AND ACETAMINOPHEN 10; 325 MG/1; MG/1
1 TABLET ORAL EVERY 6 HOURS PRN
COMMUNITY
End: 2022-09-06

## 2022-03-04 RX ORDER — CEPHALEXIN 500 MG/1
500 CAPSULE ORAL 2 TIMES DAILY
COMMUNITY
End: 2022-09-06

## 2022-03-04 RX ORDER — POTASSIUM CHLORIDE 750 MG/1
10 TABLET, FILM COATED, EXTENDED RELEASE ORAL 2 TIMES DAILY WITH MEALS
Qty: 180 TABLET | Refills: 3 | Status: SHIPPED | OUTPATIENT
Start: 2022-03-04

## 2022-03-04 RX ADMIN — PERFLUTREN 3 ML: 6.52 INJECTION, SUSPENSION INTRAVENOUS at 12:15

## 2022-03-04 NOTE — PROGRESS NOTES
CARDIOLOGY    Ashley Alba MD    ENCOUNTER DATE:  03/04/2022    Matt Cruz / 66 y.o. / male        CHIEF COMPLAINT / REASON FOR OFFICE VISIT     Coronary Artery Disease (3 month follow up )      HISTORY OF PRESENT ILLNESS       HPI    Matt Cruz is a 66 y.o. male     He has coronary disease, status post a stent to the mid LAD, hypertension, TIA, diabetes, hyperlipidemia, and carotid stenosis. He was admitted in April 2018 with strokelike symptoms.     In 2010, he did have cardiac cath showing 3-vessel disease with a 90% stenosis in the mid LAD, RCA occlusion and 30% stenosis of the left circumflex with an EF of 50% to 55%, angioplasty and drug-eluting stent of the mid LAD. However, the angioplasty was unsuccessful of the RCA. Medical management was recommended.  An echocardiogram was performed on 04/13/2018 showing mild concentric hypertrophy and an EF of 57%. He also had some anginal chest pain. He did undergo a cardiac catheterization showing severe 3-vessel coronary disease with an occluded RCA, high-grade mid left circumflex, and diffuse LAD and patent LAD. He had a drug-eluting stent placed to the mid left circumflex, placed on dual antiplatelet therapy. He was sent home with a Zio patch which did not show arrhythmia. CT angiogram of the head and neck was normal without acute intracranial hemorrhage or stroke. He did have a severe focal stenosis of the left P2 segment.    He was admitted in September 2021 with angina and ruled in for non-ST elevation myocardial infarction.  He had a heart catheterization in September 2021 which showed chronic total occlusion of the right coronary artery.  There was severe stenosis of the proximal and mid circumflex.The left anterior descending artery had up to a 50% stenosis distally.  A drug-eluting stent was placed to the mid circumflex.  His LV function appeared to be 25%.  Echocardiogram in September 2021 put his ejection fraction at 30 to 35% with grade 2  "diastolic dysfunction.  No significant valve disease.    He is doing well.  He is not exercising however he denies any chest pain.  Certainly nothing like he had before.  He does not have any shortness of breath or edema.    Echocardiogram March 4, 2022 showed his LV function had normalized with an ejection fraction of 60 to 65% and no significant valve disease.    REVIEW OF SYSTEMS     Review of Systems   Constitutional: Negative for chills, fever, weight gain and weight loss.   Cardiovascular: Negative for leg swelling.   Respiratory: Negative for cough, snoring and wheezing.    Hematologic/Lymphatic: Negative for bleeding problem. Does not bruise/bleed easily.   Skin: Negative for color change.   Musculoskeletal: Positive for back pain. Negative for falls, joint pain and myalgias.   Gastrointestinal: Negative for melena.   Genitourinary: Negative for hematuria.   Neurological: Negative for excessive daytime sleepiness.   Psychiatric/Behavioral: Negative for depression. The patient is not nervous/anxious.          VITAL SIGNS     Visit Vitals  /70 (BP Location: Right arm, Patient Position: Sitting, Cuff Size: Adult)   Pulse 64   Resp 16   Ht 170.2 cm (67\")   Wt 97.1 kg (214 lb)   SpO2 96%   BMI 33.52 kg/m²         Wt Readings from Last 3 Encounters:   03/04/22 97.1 kg (214 lb)   03/04/22 99.8 kg (220 lb)   11/04/21 99.8 kg (220 lb)     Body mass index is 33.52 kg/m².      PHYSICAL EXAMINATION     Constitutional:       General: Not in acute distress.  Neck:      Vascular: No carotid bruit or JVD.   Pulmonary:      Effort: Pulmonary effort is normal.      Breath sounds: Normal breath sounds.   Cardiovascular:      Normal rate. Regular rhythm.      Murmurs: There is no murmur.   Psychiatric:         Mood and Affect: Mood and affect normal.           REVIEWED DATA       ECG 12 Lead    Date/Time: 3/4/2022 12:35 PM  Performed by: Ashley Alba MD  Authorized by: Ashley Alba MD   Comparison: compared with " previous ECG from 11/4/2021  Similar to previous ECG  Rhythm: sinus rhythm  BPM: 64  Conduction: conduction normal  ST Segments: ST segments normal  T Waves: T waves normal    Clinical impression: normal ECG              Lipid Panel    Lipid Panel 9/10/21   Total Cholesterol 143   Triglycerides 107   HDL Cholesterol 39 (A)   VLDL Cholesterol 20   LDL Cholesterol  84   LDL/HDL Ratio 2.12   (A) Abnormal value              Lab Results   Component Value Date    GLUCOSE 191 (H) 09/15/2021    BUN 17 10/25/2021    CREATININE 0.7 10/25/2021    EGFRIFNONA 77 09/15/2021    BCR 24.0 (H) 10/25/2021    K 3.6 10/25/2021    CO2 30 10/25/2021    CALCIUM 9.5 10/25/2021    ALBUMIN 4.3 10/25/2021    LABIL2 1.2 10/25/2021    AST 40 10/25/2021    ALT 79 (H) 10/25/2021       ASSESSMENT & PLAN      Diagnosis Plan   1. Coronary artery disease involving native coronary artery of native heart without angina pectoris     2. Ischemic cardiomyopathy     3. Mixed hyperlipidemia     4. Stenosis of carotid artery, unspecified laterality     5. Essential hypertension     6. Type 2 diabetes mellitus with other circulatory complication, with long-term current use of insulin (HCC)         1.  Coronary artery disease he has a chronic total occlusion of the right coronary artery.  He had 2 drug-eluting stents to his circumflex in September 2021.  He had nonobstructive disease in the LAD and a normal left main.  Continue medical therapy.  2.  Ischemic cardiomyopathy.  Ejection fraction 32% September 2021.  He is on guideline directed medical therapy.  Repeat echo today showed that his ejection fraction had normalized.  3.  Carotid artery disease with 50% narrowing of the right internal carotid artery by CTA of the head neck April 2018  4.  Type 2 diabetes.  On insulin and Metformin.  I highly recommend starting Jardiance if he needs another agent or as replacement for Metformin.  5.  Hyperlipidemia.  Goal LDL is less than 70.  Continue high-dose  atorvastatin.    Follow-up with Erika in 6 months and then hopefully we can [place] from there.    Orders Placed This Encounter   Procedures   • ECG 12 Lead     This order was created via procedure documentation     Order Specific Question:   Release to patient     Answer:   Immediate           MEDICATIONS         Discharge Medications          Accurate as of March 4, 2022 12:40 PM. If you have any questions, ask your nurse or doctor.            Changes to Medications      Instructions Start Date   insulin detemir 100 UNIT/ML injection  Commonly known as: LEVEMIR  What changed: how much to take   20 Units, Subcutaneous, Nightly         Continue These Medications      Instructions Start Date   aspirin 81 MG EC tablet   81 mg, Oral, Daily      atorvastatin 80 MG tablet  Commonly known as: LIPITOR   80 mg, Oral, Nightly      carvedilol 25 MG tablet  Commonly known as: COREG   25 mg, Oral, Every 12 Hours Scheduled      cephalexin 500 MG capsule  Commonly known as: KEFLEX   500 mg, Oral, 2 Times Daily      clopidogrel 75 MG tablet  Commonly known as: PLAVIX   75 mg, Oral, Daily      diphenhydrAMINE 25 mg capsule  Commonly known as: BENADRYL   25 mg, Oral, Nightly PRN      furosemide 20 MG tablet  Commonly known as: LASIX   20 mg, Oral, Daily      nitroglycerin 0.4 MG SL tablet  Commonly known as: Nitrostat   0.4 mg, Sublingual, Every 5 Minutes PRN, Take no more than 3 doses in 15 minutes.      OneTouch Delica Plus [id number] misc   Use to test blood glucose four times daily      OneTouch Verio Flex System w/Device kit   Use to test blood glucose four times daily      OneTouch Verio test strip  Generic drug: glucose blood   Use to test blood glucose four times daily      oxyCODONE-acetaminophen  MG per tablet  Commonly known as: PERCOCET   1 tablet, Oral, Every 6 Hours PRN      pantoprazole 40 MG EC tablet  Commonly known as: PROTONIX   40 mg, Oral, Every Early Morning      Pen Needles 31G X 6 MM misc   1 each,  Subcutaneous      phenazopyridine 100 MG tablet  Commonly known as: PYRIDIUM   100 mg, Oral, 3 Times Daily PRN      potassium chloride 10 MEQ CR tablet   10 mEq, Oral, 2 Times Daily With Meals      sacubitril-valsartan 24-26 MG tablet  Commonly known as: ENTRESTO   1 tablet, Oral, Every 12 Hours Scheduled      Vascepa 1 g capsule capsule  Generic drug: icosapent ethyl   2 g, Oral               Ashley Alba MD  03/04/22  12:40 EST    **Dragon Disclaimer:   Much of this encounter note is an electronic transcription/translation of spoken language to printed text. The electronic translation of spoken language may permit erroneous, or at times, nonsensical words or phrases to be inadvertently transcribed. Although I have reviewed the note for such errors, some may still exist.

## 2022-09-06 ENCOUNTER — OFFICE VISIT (OUTPATIENT)
Dept: CARDIOLOGY | Facility: CLINIC | Age: 67
End: 2022-09-06

## 2022-09-06 VITALS
HEIGHT: 67 IN | WEIGHT: 219 LBS | SYSTOLIC BLOOD PRESSURE: 91 MMHG | HEART RATE: 65 BPM | BODY MASS INDEX: 34.37 KG/M2 | DIASTOLIC BLOOD PRESSURE: 75 MMHG

## 2022-09-06 DIAGNOSIS — E11.59 TYPE 2 DIABETES MELLITUS WITH OTHER CIRCULATORY COMPLICATION, WITH LONG-TERM CURRENT USE OF INSULIN: ICD-10-CM

## 2022-09-06 DIAGNOSIS — Z79.4 TYPE 2 DIABETES MELLITUS WITH OTHER CIRCULATORY COMPLICATION, WITH LONG-TERM CURRENT USE OF INSULIN: ICD-10-CM

## 2022-09-06 DIAGNOSIS — I35.8 AORTIC VALVE SCLEROSIS: ICD-10-CM

## 2022-09-06 DIAGNOSIS — I65.29 STENOSIS OF CAROTID ARTERY, UNSPECIFIED LATERALITY: ICD-10-CM

## 2022-09-06 DIAGNOSIS — E78.2 MIXED HYPERLIPIDEMIA: ICD-10-CM

## 2022-09-06 DIAGNOSIS — I10 ESSENTIAL HYPERTENSION: ICD-10-CM

## 2022-09-06 DIAGNOSIS — I25.10 CORONARY ARTERY DISEASE INVOLVING NATIVE CORONARY ARTERY OF NATIVE HEART WITHOUT ANGINA PECTORIS: Primary | ICD-10-CM

## 2022-09-06 PROCEDURE — 93000 ELECTROCARDIOGRAM COMPLETE: CPT | Performed by: NURSE PRACTITIONER

## 2022-09-06 PROCEDURE — 99214 OFFICE O/P EST MOD 30 MIN: CPT | Performed by: NURSE PRACTITIONER

## 2022-09-06 RX ORDER — PREGABALIN 50 MG/1
50 CAPSULE ORAL
COMMUNITY

## 2022-09-06 NOTE — PROGRESS NOTES
Date of Office Visit: 22  Encounter Provider: ISABELLA Mclean  Place of Service: [facility] CARDIOLOGY  Patient Name: Matt Cruz  :1955    Chief Complaint   Patient presents with   • Coronary artery disease involving native heart without laurita   • Cardiomyopathy   • Hypertension   • Hyperlipidemia   • Follow-up   :     HPI: Matt Cruz is a 66 y.o. male  with hypertension, hyperlipidemia, diabetes mellitus, aortic valve sclerosis coronary artery disease, non-STEMI, obesity, and ischemic cardiomyopathy.  He is followed by Dr. Alba.  I will visit with him in follow-up and have reviewed his medical record.  He has history of a stent to the mid LAD in .  At that time angioplasty was unsuccessful of the right coronary artery.  In 2018 he had some strokelike symptoms and CT of the head was negative for acute disease.  MRI showed no acute infarct.  He was hypertensive and also complained of chest pain.         He was admitted 2021 with angina.  He ruled in for non-STEMI.  Echocardiogram revealed ejection fraction approximately 33%, extensive regional wall motion abnormalities, grade 2 diastolic dysfunction and moderately calcified aortic valve with no significant regurgitation or stenosis.  He had cardiac catheterization which revealed nonobstructive coronary artery disease of the left main and left anterior descending artery.  Left circumflex had a 95% stenosis in the proximal segment followed by a 75% stenosis in the mid segment.  Marginal branches had nonobstructive disease.  The right coronary artery was completely occluded proximally with left to right collateral flow.  He received 2 drug-eluting stents at the proximal and mid circumflex.  He was started on goal-directed medical therapy for ischemic cardiomyopathy.  His home valsartan was stopped and he was started on Entresto.  He was also started on furosemide.  His hemoglobin A1c was not  "controlled.  Follow-up echo March 2022 showed ejection fraction returned to normal.  He had aortic valve sclerosis without regurgitation or stenosis  He presents today for reassessment.  He goes on a walk 2 to 3 days a week anywhere from 20 to 30 minutes.  He generally has no symptoms with this.  He reports occasional shortness of breath with bending over or if he is carrying groceries.  He has no wheezing.  His symptoms resolved within 5 minutes.  He denies chest pain.  He reports some occasional chest tightness when his breathing is bad but that is not frequent.  He has stable swelling.  He reportedly takes all of his medication as prescribed.        No Known Allergies        Family and social history reviewed.     ROS  All other systems were reviewed and are negative          Objective:     Vitals:    09/06/22 1339   BP: 91/75   BP Location: Left arm   Patient Position: Sitting   Pulse: 65   Weight: 99.3 kg (219 lb)   Height: 170.2 cm (67\")     Body mass index is 34.3 kg/m².    PHYSICAL EXAM:  Cardiovascular:      Normal rate. Regular rhythm.      Murmurs: There is a grade 2/6 systolic murmur at the ULSB.   Edema:     Ankle: bilateral trace edema of the ankle.          ECG 12 Lead    Date/Time: 9/6/2022 2:17 PM  Performed by: Erika Clifford APRN  Authorized by: Erika Clifford APRN   Comparison: compared with previous ECG   Similar to previous ECG  Rhythm: sinus rhythm  Rate: normal  T inversion: III and aVF  Comments: No significant change              Current Outpatient Medications   Medication Sig Dispense Refill   • aspirin 81 MG EC tablet Take 1 tablet by mouth Daily. 30 tablet 0   • atorvastatin (LIPITOR) 80 MG tablet Take 1 tablet by mouth Every Night. 90 tablet 2   • Blood Glucose Monitoring Suppl (OneTouch Verio) w/Device kit Use to test blood glucose four times daily 1 kit 0   • carvedilol (COREG) 25 MG tablet Take 1 tablet by mouth Every 12 (Twelve) Hours. 180 tablet 2   • Cholecalciferol (VITAMIN D-3 " PO) Take  by mouth.     • Cholecalciferol 50 MCG (2000 UT) capsule Take 1 capsule by mouth Daily.     • clopidogrel (PLAVIX) 75 MG tablet Take 1 tablet by mouth Daily. 90 tablet 3   • Cyanocobalamin (VITAMIN B-12 PO) Take  by mouth Daily.     • diphenhydrAMINE (BENADRYL) 25 mg capsule Take 25 mg by mouth At Night As Needed for Allergies.     • empagliflozin (JARDIANCE) 10 MG tablet tablet Take 1 tablet by mouth Daily.     • furosemide (LASIX) 20 MG tablet Take 1 tablet by mouth Daily. 90 tablet 3   • glucose blood (OneTouch Verio) test strip Use to test blood glucose four times daily 100 each 0   • insulin detemir (LEVEMIR) 100 UNIT/ML injection Inject 20 Units under the skin Every Night. (Patient taking differently: Inject 30 Units under the skin into the appropriate area as directed Every Night.) 2 pen 0   • Insulin Pen Needle (PEN NEEDLES) 31G X 6 MM misc Inject 1 each under the skin.     • Lancets (OneTouch Delica Plus [id number]) misc Use to test blood glucose four times daily 100 each 0   • nitroglycerin (Nitrostat) 0.4 MG SL tablet Place 1 tablet under the tongue Every 5 (Five) Minutes As Needed for Chest Pain. Take no more than 3 doses in 15 minutes. 30 tablet 0   • potassium chloride 10 MEQ CR tablet Take 1 tablet by mouth 2 (Two) Times a Day With Meals. 180 tablet 3   • pregabalin (LYRICA) 50 MG capsule Take 50 mg by mouth.     • sacubitril-valsartan (ENTRESTO) 24-26 MG tablet Take 1 tablet by mouth Every 12 (Twelve) Hours. 180 tablet 3     No current facility-administered medications for this visit.     Assessment:       Diagnosis Plan   1. Coronary artery disease involving native coronary artery of native heart without angina pectoris     2. Essential hypertension     3. Mixed hyperlipidemia     4. Type 2 diabetes mellitus with other circulatory complication, with long-term current use of insulin ([facility])     5. Stenosis of carotid artery, unspecified laterality     6. Aortic valve sclerosis          Orders  Placed This Encounter   Procedures   • ECG 12 Lead     This order was created via procedure documentation     Order Specific Question:   Release to patient     Answer:   Routine Release         Plan:       1. 66 year old gentleman with coronary artery disease, preserved left ventricular systolic function status post stent to the mid circumflex April 2018,  of right coronary artery then Non-ST elevation myocardial infarction September 2021. LVEF of 32% s/p  2 drug-eluting stents at the proximal and mid circumflex. EF normalized 03/2022.   continue aspirin and plavix. No ischemic EKG changes.  2.  Ischemic cardiomyopathy with an EF of 32% in September 2021. EF normalized 03/2022.  He is maintained on Entresto, carvedilol and Lasix.   3.Carotid artery stenosis with 50% narrowing on the right ICA on CTA head neck April 2018  4.  Diabetes type 2.  His most recent hemoglobin A1c is 6.3 indicating good control continue current regimen  5.  Hyperlipidemia continue atorvastatin 80 mg. Target LDL 70 or less  6. Probable COPD- he has stable dyspnea on exertion with associated chest tightness.  This goes away within 5 minutes.  May need to consider pulmonary function test and pulmonary referral if this progresses.   7.  Hypertension-if anything his blood pressure appears on the low side today but I found a value of 120/70 within the last couple months in his chart.      Follow-up in 6 months call question concerns        It has been a pleasure to participate in this patient's care.      Thank you,  ISABELLA Mclean      **I used Dragon to dictate this note:**

## 2022-10-26 ENCOUNTER — APPOINTMENT (OUTPATIENT)
Dept: GENERAL RADIOLOGY | Facility: HOSPITAL | Age: 67
End: 2022-10-26

## 2022-10-26 ENCOUNTER — HOSPITAL ENCOUNTER (INPATIENT)
Facility: HOSPITAL | Age: 67
LOS: 1 days | Discharge: HOME | End: 2022-10-28
Attending: EMERGENCY MEDICINE | Admitting: HOSPITALIST

## 2022-10-26 DIAGNOSIS — Z98.61 S/P PTCA (PERCUTANEOUS TRANSLUMINAL CORONARY ANGIOPLASTY): ICD-10-CM

## 2022-10-26 DIAGNOSIS — R73.9 HYPERGLYCEMIA: ICD-10-CM

## 2022-10-26 DIAGNOSIS — I21.4 NSTEMI (NON-ST ELEVATED MYOCARDIAL INFARCTION): Primary | ICD-10-CM

## 2022-10-26 LAB
BASOPHILS # BLD AUTO: 0.04 10*3/MM3 (ref 0–0.2)
BASOPHILS NFR BLD AUTO: 0.3 % (ref 0–1.5)
DEPRECATED RDW RBC AUTO: 45.3 FL (ref 37–54)
EOSINOPHIL # BLD AUTO: 0.58 10*3/MM3 (ref 0–0.4)
EOSINOPHIL NFR BLD AUTO: 4.2 % (ref 0.3–6.2)
ERYTHROCYTE [DISTWIDTH] IN BLOOD BY AUTOMATED COUNT: 15 % (ref 12.3–15.4)
HCT VFR BLD AUTO: 35.7 % (ref 37.5–51)
HGB BLD-MCNC: 10.8 G/DL (ref 13–17.7)
IMM GRANULOCYTES # BLD AUTO: 0.08 10*3/MM3 (ref 0–0.05)
IMM GRANULOCYTES NFR BLD AUTO: 0.6 % (ref 0–0.5)
LYMPHOCYTES # BLD AUTO: 1.65 10*3/MM3 (ref 0.7–3.1)
LYMPHOCYTES NFR BLD AUTO: 11.8 % (ref 19.6–45.3)
MCH RBC QN AUTO: 25.4 PG (ref 26.6–33)
MCHC RBC AUTO-ENTMCNC: 30.3 G/DL (ref 31.5–35.7)
MCV RBC AUTO: 83.8 FL (ref 79–97)
MONOCYTES # BLD AUTO: 1.04 10*3/MM3 (ref 0.1–0.9)
MONOCYTES NFR BLD AUTO: 7.4 % (ref 5–12)
NEUTROPHILS NFR BLD AUTO: 10.57 10*3/MM3 (ref 1.7–7)
NEUTROPHILS NFR BLD AUTO: 75.7 % (ref 42.7–76)
NRBC BLD AUTO-RTO: 0 /100 WBC (ref 0–0.2)
PLATELET # BLD AUTO: 257 10*3/MM3 (ref 140–450)
PMV BLD AUTO: 11 FL (ref 6–12)
RBC # BLD AUTO: 4.26 10*6/MM3 (ref 4.14–5.8)
WBC NRBC COR # BLD: 13.96 10*3/MM3 (ref 3.4–10.8)

## 2022-10-26 PROCEDURE — 80053 COMPREHEN METABOLIC PANEL: CPT | Performed by: EMERGENCY MEDICINE

## 2022-10-26 PROCEDURE — 93005 ELECTROCARDIOGRAM TRACING: CPT

## 2022-10-26 PROCEDURE — 71045 X-RAY EXAM CHEST 1 VIEW: CPT

## 2022-10-26 PROCEDURE — 84484 ASSAY OF TROPONIN QUANT: CPT | Performed by: EMERGENCY MEDICINE

## 2022-10-26 PROCEDURE — 83036 HEMOGLOBIN GLYCOSYLATED A1C: CPT | Performed by: NURSE PRACTITIONER

## 2022-10-26 PROCEDURE — 93010 ELECTROCARDIOGRAM REPORT: CPT | Performed by: INTERNAL MEDICINE

## 2022-10-26 PROCEDURE — 99284 EMERGENCY DEPT VISIT MOD MDM: CPT

## 2022-10-26 PROCEDURE — 93005 ELECTROCARDIOGRAM TRACING: CPT | Performed by: EMERGENCY MEDICINE

## 2022-10-26 PROCEDURE — 85025 COMPLETE CBC W/AUTO DIFF WBC: CPT | Performed by: EMERGENCY MEDICINE

## 2022-10-26 RX ORDER — SODIUM CHLORIDE 0.9 % (FLUSH) 0.9 %
10 SYRINGE (ML) INJECTION AS NEEDED
Status: DISCONTINUED | OUTPATIENT
Start: 2022-10-26 | End: 2022-10-28 | Stop reason: HOSPADM

## 2022-10-26 RX ORDER — ASPIRIN 325 MG
325 TABLET ORAL ONCE
Status: COMPLETED | OUTPATIENT
Start: 2022-10-26 | End: 2022-10-26

## 2022-10-26 RX ADMIN — ASPIRIN 325 MG: 325 TABLET ORAL at 23:56

## 2022-10-27 PROBLEM — I21.4 NSTEMI (NON-ST ELEVATED MYOCARDIAL INFARCTION): Status: ACTIVE | Noted: 2022-10-27

## 2022-10-27 PROBLEM — I25.118 CORONARY ARTERY DISEASE OF NATIVE ARTERY OF NATIVE HEART WITH STABLE ANGINA PECTORIS: Status: ACTIVE | Noted: 2018-04-17

## 2022-10-27 LAB
ALBUMIN SERPL-MCNC: 3.7 G/DL (ref 3.5–5.2)
ALBUMIN SERPL-MCNC: 3.9 G/DL (ref 3.5–5.2)
ALBUMIN/GLOB SERPL: 1.2 G/DL
ALBUMIN/GLOB SERPL: 1.4 G/DL
ALP SERPL-CCNC: 147 U/L (ref 39–117)
ALP SERPL-CCNC: 157 U/L (ref 39–117)
ALT SERPL W P-5'-P-CCNC: 12 U/L (ref 1–41)
ALT SERPL W P-5'-P-CCNC: 13 U/L (ref 1–41)
ANION GAP SERPL CALCULATED.3IONS-SCNC: 10.3 MMOL/L (ref 5–15)
ANION GAP SERPL CALCULATED.3IONS-SCNC: 12.5 MMOL/L (ref 5–15)
AST SERPL-CCNC: 22 U/L (ref 1–40)
AST SERPL-CCNC: 25 U/L (ref 1–40)
BILIRUB SERPL-MCNC: 0.2 MG/DL (ref 0–1.2)
BILIRUB SERPL-MCNC: <0.2 MG/DL (ref 0–1.2)
BUN SERPL-MCNC: 24 MG/DL (ref 8–23)
BUN SERPL-MCNC: 26 MG/DL (ref 8–23)
BUN/CREAT SERPL: 30.6 (ref 7–25)
BUN/CREAT SERPL: 34.8 (ref 7–25)
CALCIUM SPEC-SCNC: 9.2 MG/DL (ref 8.6–10.5)
CALCIUM SPEC-SCNC: 9.5 MG/DL (ref 8.6–10.5)
CHLORIDE SERPL-SCNC: 112 MMOL/L (ref 98–107)
CHLORIDE SERPL-SCNC: 112 MMOL/L (ref 98–107)
CO2 SERPL-SCNC: 17.5 MMOL/L (ref 22–29)
CO2 SERPL-SCNC: 17.7 MMOL/L (ref 22–29)
CREAT SERPL-MCNC: 0.69 MG/DL (ref 0.76–1.27)
CREAT SERPL-MCNC: 0.85 MG/DL (ref 0.76–1.27)
DEPRECATED RDW RBC AUTO: 44 FL (ref 37–54)
EGFRCR SERPLBLD CKD-EPI 2021: 101.4 ML/MIN/1.73
EGFRCR SERPLBLD CKD-EPI 2021: 95.2 ML/MIN/1.73
ERYTHROCYTE [DISTWIDTH] IN BLOOD BY AUTOMATED COUNT: 14.9 % (ref 12.3–15.4)
GLOBULIN UR ELPH-MCNC: 2.6 GM/DL
GLOBULIN UR ELPH-MCNC: 3.2 GM/DL
GLUCOSE BLDC GLUCOMTR-MCNC: 112 MG/DL (ref 70–130)
GLUCOSE BLDC GLUCOMTR-MCNC: 119 MG/DL (ref 70–130)
GLUCOSE BLDC GLUCOMTR-MCNC: 143 MG/DL (ref 70–130)
GLUCOSE BLDC GLUCOMTR-MCNC: 201 MG/DL (ref 70–130)
GLUCOSE SERPL-MCNC: 136 MG/DL (ref 65–99)
GLUCOSE SERPL-MCNC: 172 MG/DL (ref 65–99)
HBA1C MFR BLD: 6 % (ref 4.8–5.6)
HCT VFR BLD AUTO: 32.6 % (ref 37.5–51)
HGB BLD-MCNC: 10.3 G/DL (ref 13–17.7)
HOLD SPECIMEN: NORMAL
MCH RBC QN AUTO: 25.4 PG (ref 26.6–33)
MCHC RBC AUTO-ENTMCNC: 31.6 G/DL (ref 31.5–35.7)
MCV RBC AUTO: 80.5 FL (ref 79–97)
PLATELET # BLD AUTO: 235 10*3/MM3 (ref 140–450)
PMV BLD AUTO: 11.1 FL (ref 6–12)
POTASSIUM SERPL-SCNC: 4.3 MMOL/L (ref 3.5–5.2)
POTASSIUM SERPL-SCNC: 4.5 MMOL/L (ref 3.5–5.2)
PROT SERPL-MCNC: 6.3 G/DL (ref 6–8.5)
PROT SERPL-MCNC: 7.1 G/DL (ref 6–8.5)
QT INTERVAL: 331 MS
QT INTERVAL: 370 MS
RBC # BLD AUTO: 4.05 10*6/MM3 (ref 4.14–5.8)
SODIUM SERPL-SCNC: 140 MMOL/L (ref 136–145)
SODIUM SERPL-SCNC: 142 MMOL/L (ref 136–145)
TROPONIN T SERPL-MCNC: 0.07 NG/ML (ref 0–0.03)
TROPONIN T SERPL-MCNC: 0.14 NG/ML (ref 0–0.03)
TROPONIN T SERPL-MCNC: 0.25 NG/ML (ref 0–0.03)
WBC NRBC COR # BLD: 13.5 10*3/MM3 (ref 3.4–10.8)
WHOLE BLOOD HOLD COAG: NORMAL
WHOLE BLOOD HOLD SPECIMEN: NORMAL

## 2022-10-27 PROCEDURE — C1725 CATH, TRANSLUMIN NON-LASER: HCPCS | Performed by: STUDENT IN AN ORGANIZED HEALTH CARE EDUCATION/TRAINING PROGRAM

## 2022-10-27 PROCEDURE — 92920 PRQ TRLUML C ANGIOP 1ART&/BR: CPT | Performed by: STUDENT IN AN ORGANIZED HEALTH CARE EDUCATION/TRAINING PROGRAM

## 2022-10-27 PROCEDURE — 94799 UNLISTED PULMONARY SVC/PX: CPT

## 2022-10-27 PROCEDURE — 63710000001 INSULIN LISPRO (HUMAN) PER 5 UNITS: Performed by: STUDENT IN AN ORGANIZED HEALTH CARE EDUCATION/TRAINING PROGRAM

## 2022-10-27 PROCEDURE — 85027 COMPLETE CBC AUTOMATED: CPT | Performed by: NURSE PRACTITIONER

## 2022-10-27 PROCEDURE — 82962 GLUCOSE BLOOD TEST: CPT

## 2022-10-27 PROCEDURE — 93458 L HRT ARTERY/VENTRICLE ANGIO: CPT | Performed by: STUDENT IN AN ORGANIZED HEALTH CARE EDUCATION/TRAINING PROGRAM

## 2022-10-27 PROCEDURE — C1887 CATHETER, GUIDING: HCPCS | Performed by: STUDENT IN AN ORGANIZED HEALTH CARE EDUCATION/TRAINING PROGRAM

## 2022-10-27 PROCEDURE — 25010000002 NALOXONE PER 1 MG: Performed by: STUDENT IN AN ORGANIZED HEALTH CARE EDUCATION/TRAINING PROGRAM

## 2022-10-27 PROCEDURE — C1769 GUIDE WIRE: HCPCS | Performed by: STUDENT IN AN ORGANIZED HEALTH CARE EDUCATION/TRAINING PROGRAM

## 2022-10-27 PROCEDURE — 25010000002 HEPARIN (PORCINE) PER 1000 UNITS: Performed by: STUDENT IN AN ORGANIZED HEALTH CARE EDUCATION/TRAINING PROGRAM

## 2022-10-27 PROCEDURE — 36415 COLL VENOUS BLD VENIPUNCTURE: CPT | Performed by: NURSE PRACTITIONER

## 2022-10-27 PROCEDURE — 4A023N7 MEASUREMENT OF CARDIAC SAMPLING AND PRESSURE, LEFT HEART, PERCUTANEOUS APPROACH: ICD-10-PCS | Performed by: STUDENT IN AN ORGANIZED HEALTH CARE EDUCATION/TRAINING PROGRAM

## 2022-10-27 PROCEDURE — B2111ZZ FLUOROSCOPY OF MULTIPLE CORONARY ARTERIES USING LOW OSMOLAR CONTRAST: ICD-10-PCS | Performed by: STUDENT IN AN ORGANIZED HEALTH CARE EDUCATION/TRAINING PROGRAM

## 2022-10-27 PROCEDURE — C1894 INTRO/SHEATH, NON-LASER: HCPCS | Performed by: STUDENT IN AN ORGANIZED HEALTH CARE EDUCATION/TRAINING PROGRAM

## 2022-10-27 PROCEDURE — 93010 ELECTROCARDIOGRAM REPORT: CPT | Performed by: INTERNAL MEDICINE

## 2022-10-27 PROCEDURE — 0 IOPAMIDOL PER 1 ML: Performed by: STUDENT IN AN ORGANIZED HEALTH CARE EDUCATION/TRAINING PROGRAM

## 2022-10-27 PROCEDURE — 25010000002 MIDAZOLAM PER 1 MG: Performed by: STUDENT IN AN ORGANIZED HEALTH CARE EDUCATION/TRAINING PROGRAM

## 2022-10-27 PROCEDURE — 93005 ELECTROCARDIOGRAM TRACING: CPT | Performed by: INTERNAL MEDICINE

## 2022-10-27 PROCEDURE — 99153 MOD SED SAME PHYS/QHP EA: CPT | Performed by: STUDENT IN AN ORGANIZED HEALTH CARE EDUCATION/TRAINING PROGRAM

## 2022-10-27 PROCEDURE — 02703ZZ DILATION OF CORONARY ARTERY, ONE ARTERY, PERCUTANEOUS APPROACH: ICD-10-PCS | Performed by: STUDENT IN AN ORGANIZED HEALTH CARE EDUCATION/TRAINING PROGRAM

## 2022-10-27 PROCEDURE — 99152 MOD SED SAME PHYS/QHP 5/>YRS: CPT | Performed by: STUDENT IN AN ORGANIZED HEALTH CARE EDUCATION/TRAINING PROGRAM

## 2022-10-27 PROCEDURE — 84484 ASSAY OF TROPONIN QUANT: CPT | Performed by: NURSE PRACTITIONER

## 2022-10-27 PROCEDURE — 99222 1ST HOSP IP/OBS MODERATE 55: CPT | Performed by: INTERNAL MEDICINE

## 2022-10-27 PROCEDURE — 85347 COAGULATION TIME ACTIVATED: CPT

## 2022-10-27 PROCEDURE — 84484 ASSAY OF TROPONIN QUANT: CPT | Performed by: EMERGENCY MEDICINE

## 2022-10-27 PROCEDURE — 80053 COMPREHEN METABOLIC PANEL: CPT | Performed by: NURSE PRACTITIONER

## 2022-10-27 PROCEDURE — 25010000002 FENTANYL CITRATE (PF) 50 MCG/ML SOLUTION: Performed by: STUDENT IN AN ORGANIZED HEALTH CARE EDUCATION/TRAINING PROGRAM

## 2022-10-27 RX ORDER — FLUMAZENIL 0.1 MG/ML
INJECTION INTRAVENOUS
Status: DISPENSED
Start: 2022-10-27 | End: 2022-10-28

## 2022-10-27 RX ORDER — ONDANSETRON 2 MG/ML
4 INJECTION INTRAMUSCULAR; INTRAVENOUS EVERY 6 HOURS PRN
Status: DISCONTINUED | OUTPATIENT
Start: 2022-10-27 | End: 2022-10-28 | Stop reason: HOSPADM

## 2022-10-27 RX ORDER — NALOXONE HCL 0.4 MG/ML
0.2 VIAL (ML) INJECTION
Status: DISCONTINUED | OUTPATIENT
Start: 2022-10-27 | End: 2022-10-28 | Stop reason: HOSPADM

## 2022-10-27 RX ORDER — NICOTINE POLACRILEX 4 MG
15 LOZENGE BUCCAL
Status: DISCONTINUED | OUTPATIENT
Start: 2022-10-27 | End: 2022-10-28 | Stop reason: HOSPADM

## 2022-10-27 RX ORDER — ONDANSETRON 4 MG/1
4 TABLET, FILM COATED ORAL EVERY 6 HOURS PRN
Status: DISCONTINUED | OUTPATIENT
Start: 2022-10-27 | End: 2022-10-28 | Stop reason: HOSPADM

## 2022-10-27 RX ORDER — FLUMAZENIL 0.1 MG/ML
0.2 INJECTION INTRAVENOUS ONCE
Status: COMPLETED | OUTPATIENT
Start: 2022-10-27 | End: 2022-10-27

## 2022-10-27 RX ORDER — CARVEDILOL 25 MG/1
25 TABLET ORAL 2 TIMES DAILY WITH MEALS
Status: DISCONTINUED | OUTPATIENT
Start: 2022-10-27 | End: 2022-10-28 | Stop reason: HOSPADM

## 2022-10-27 RX ORDER — ATORVASTATIN CALCIUM 80 MG/1
80 TABLET, FILM COATED ORAL NIGHTLY
Status: DISCONTINUED | OUTPATIENT
Start: 2022-10-27 | End: 2022-10-28 | Stop reason: HOSPADM

## 2022-10-27 RX ORDER — ACETAMINOPHEN 325 MG/1
650 TABLET ORAL EVERY 4 HOURS PRN
Status: DISCONTINUED | OUTPATIENT
Start: 2022-10-27 | End: 2022-10-28 | Stop reason: HOSPADM

## 2022-10-27 RX ORDER — FENTANYL CITRATE 50 UG/ML
INJECTION, SOLUTION INTRAMUSCULAR; INTRAVENOUS
Status: DISCONTINUED | OUTPATIENT
Start: 2022-10-27 | End: 2022-10-27 | Stop reason: HOSPADM

## 2022-10-27 RX ORDER — NALOXONE HCL 0.4 MG/ML
0.4 VIAL (ML) INJECTION
Status: DISCONTINUED | OUTPATIENT
Start: 2022-10-27 | End: 2022-10-28 | Stop reason: HOSPADM

## 2022-10-27 RX ORDER — ALUMINA, MAGNESIA, AND SIMETHICONE 2400; 2400; 240 MG/30ML; MG/30ML; MG/30ML
15 SUSPENSION ORAL EVERY 6 HOURS PRN
Status: DISCONTINUED | OUTPATIENT
Start: 2022-10-27 | End: 2022-10-28 | Stop reason: HOSPADM

## 2022-10-27 RX ORDER — CLOPIDOGREL BISULFATE 75 MG/1
TABLET ORAL
Status: DISCONTINUED | OUTPATIENT
Start: 2022-10-27 | End: 2022-10-27 | Stop reason: HOSPADM

## 2022-10-27 RX ORDER — VERAPAMIL HYDROCHLORIDE 2.5 MG/ML
INJECTION, SOLUTION INTRAVENOUS
Status: DISCONTINUED | OUTPATIENT
Start: 2022-10-27 | End: 2022-10-27 | Stop reason: HOSPADM

## 2022-10-27 RX ORDER — PREGABALIN 50 MG/1
50 CAPSULE ORAL DAILY
Status: DISCONTINUED | OUTPATIENT
Start: 2022-10-27 | End: 2022-10-28 | Stop reason: HOSPADM

## 2022-10-27 RX ORDER — ACETAMINOPHEN 160 MG/5ML
650 SOLUTION ORAL EVERY 4 HOURS PRN
Status: DISCONTINUED | OUTPATIENT
Start: 2022-10-27 | End: 2022-10-28 | Stop reason: HOSPADM

## 2022-10-27 RX ORDER — CLOPIDOGREL BISULFATE 75 MG/1
75 TABLET ORAL DAILY
Status: DISCONTINUED | OUTPATIENT
Start: 2022-10-27 | End: 2022-10-28 | Stop reason: HOSPADM

## 2022-10-27 RX ORDER — ACETAMINOPHEN 650 MG/1
650 SUPPOSITORY RECTAL EVERY 4 HOURS PRN
Status: DISCONTINUED | OUTPATIENT
Start: 2022-10-27 | End: 2022-10-28 | Stop reason: HOSPADM

## 2022-10-27 RX ORDER — HEPARIN SODIUM 1000 [USP'U]/ML
INJECTION, SOLUTION INTRAVENOUS; SUBCUTANEOUS
Status: DISCONTINUED | OUTPATIENT
Start: 2022-10-27 | End: 2022-10-27 | Stop reason: HOSPADM

## 2022-10-27 RX ORDER — DEXTROSE MONOHYDRATE 25 G/50ML
25 INJECTION, SOLUTION INTRAVENOUS
Status: DISCONTINUED | OUTPATIENT
Start: 2022-10-27 | End: 2022-10-28 | Stop reason: HOSPADM

## 2022-10-27 RX ORDER — INSULIN LISPRO 100 [IU]/ML
0-9 INJECTION, SOLUTION INTRAVENOUS; SUBCUTANEOUS
Status: DISCONTINUED | OUTPATIENT
Start: 2022-10-27 | End: 2022-10-28 | Stop reason: HOSPADM

## 2022-10-27 RX ORDER — ASPIRIN 81 MG/1
81 TABLET ORAL DAILY
Status: DISCONTINUED | OUTPATIENT
Start: 2022-10-27 | End: 2022-10-28 | Stop reason: HOSPADM

## 2022-10-27 RX ORDER — SODIUM CHLORIDE 0.9 % (FLUSH) 0.9 %
10 SYRINGE (ML) INJECTION AS NEEDED
Status: DISCONTINUED | OUTPATIENT
Start: 2022-10-27 | End: 2022-10-28 | Stop reason: HOSPADM

## 2022-10-27 RX ORDER — LIDOCAINE HYDROCHLORIDE 20 MG/ML
INJECTION, SOLUTION INFILTRATION; PERINEURAL
Status: DISCONTINUED | OUTPATIENT
Start: 2022-10-27 | End: 2022-10-27 | Stop reason: HOSPADM

## 2022-10-27 RX ORDER — NITROGLYCERIN 0.4 MG/1
0.4 TABLET SUBLINGUAL
Status: DISCONTINUED | OUTPATIENT
Start: 2022-10-27 | End: 2022-10-28 | Stop reason: HOSPADM

## 2022-10-27 RX ORDER — MIDAZOLAM HYDROCHLORIDE 1 MG/ML
INJECTION INTRAMUSCULAR; INTRAVENOUS
Status: DISCONTINUED | OUTPATIENT
Start: 2022-10-27 | End: 2022-10-27 | Stop reason: HOSPADM

## 2022-10-27 RX ADMIN — CARVEDILOL 25 MG: 25 TABLET, FILM COATED ORAL at 21:27

## 2022-10-27 RX ADMIN — SACUBITRIL AND VALSARTAN 1 TABLET: 24; 26 TABLET, FILM COATED ORAL at 21:27

## 2022-10-27 RX ADMIN — ACETAMINOPHEN 650 MG: 325 TABLET, FILM COATED ORAL at 14:01

## 2022-10-27 RX ADMIN — INSULIN GLARGINE-YFGN 25 UNITS: 100 INJECTION, SOLUTION SUBCUTANEOUS at 21:28

## 2022-10-27 RX ADMIN — CARVEDILOL 25 MG: 25 TABLET, FILM COATED ORAL at 09:42

## 2022-10-27 RX ADMIN — ASPIRIN 81 MG: 81 TABLET, COATED ORAL at 09:42

## 2022-10-27 RX ADMIN — SACUBITRIL AND VALSARTAN 1 TABLET: 24; 26 TABLET, FILM COATED ORAL at 09:42

## 2022-10-27 RX ADMIN — INSULIN LISPRO 4 UNITS: 100 INJECTION, SOLUTION INTRAVENOUS; SUBCUTANEOUS at 17:36

## 2022-10-27 RX ADMIN — NITROGLYCERIN 1 INCH: 20 OINTMENT TOPICAL at 02:40

## 2022-10-27 RX ADMIN — CLOPIDOGREL 75 MG: 75 TABLET, FILM COATED ORAL at 09:42

## 2022-10-27 RX ADMIN — FLUMAZENIL 0.2 MG: 0.1 INJECTION INTRAVENOUS at 13:56

## 2022-10-27 RX ADMIN — ATORVASTATIN CALCIUM 80 MG: 80 TABLET, FILM COATED ORAL at 21:27

## 2022-10-27 RX ADMIN — NALXONE HYDROCHLORIDE 0.4 MG: 0.4 INJECTION INTRAMUSCULAR; INTRAVENOUS; SUBCUTANEOUS at 13:55

## 2022-10-28 ENCOUNTER — APPOINTMENT (OUTPATIENT)
Dept: CARDIOLOGY | Facility: HOSPITAL | Age: 67
End: 2022-10-28

## 2022-10-28 ENCOUNTER — READMISSION MANAGEMENT (OUTPATIENT)
Dept: CALL CENTER | Facility: HOSPITAL | Age: 67
End: 2022-10-28

## 2022-10-28 VITALS
BODY MASS INDEX: 33.74 KG/M2 | OXYGEN SATURATION: 98 % | HEIGHT: 67 IN | RESPIRATION RATE: 18 BRPM | WEIGHT: 215 LBS | SYSTOLIC BLOOD PRESSURE: 113 MMHG | TEMPERATURE: 97.8 F | DIASTOLIC BLOOD PRESSURE: 64 MMHG | HEART RATE: 69 BPM

## 2022-10-28 LAB
ACT BLD: 219 SECONDS (ref 82–152)
ACT BLD: 265 SECONDS (ref 82–152)
ANION GAP SERPL CALCULATED.3IONS-SCNC: 7.1 MMOL/L (ref 5–15)
AORTIC DIMENSIONLESS INDEX: 0.8 (DI)
BH CV ECHO MEAS - ACS: 1.63 CM
BH CV ECHO MEAS - AO MAX PG: 7.7 MMHG
BH CV ECHO MEAS - AO MEAN PG: 4.1 MMHG
BH CV ECHO MEAS - AO V2 MAX: 139 CM/SEC
BH CV ECHO MEAS - AO V2 VTI: 23.6 CM
BH CV ECHO MEAS - AVA(I,D): 2.8 CM2
BH CV ECHO MEAS - CONTRAST EF 4CH: 61 CM2
BH CV ECHO MEAS - EDV(CUBED): 84.9 ML
BH CV ECHO MEAS - EDV(MOD-SP2): 117 ML
BH CV ECHO MEAS - EDV(MOD-SP4): 136 ML
BH CV ECHO MEAS - EF(MOD-BP): 61.2 %
BH CV ECHO MEAS - EF(MOD-SP2): 56.4 %
BH CV ECHO MEAS - EF(MOD-SP4): 64 %
BH CV ECHO MEAS - ESV(CUBED): 27.8 ML
BH CV ECHO MEAS - ESV(MOD-SP2): 51 ML
BH CV ECHO MEAS - ESV(MOD-SP4): 49 ML
BH CV ECHO MEAS - FS: 31.1 %
BH CV ECHO MEAS - IVS/LVPW: 0.89 CM
BH CV ECHO MEAS - IVSD: 1.27 CM
BH CV ECHO MEAS - LAT PEAK E' VEL: 7.8 CM/SEC
BH CV ECHO MEAS - LV DIASTOLIC VOL/BSA (35-75): 65.2 CM2
BH CV ECHO MEAS - LV MASS(C)D: 227.5 GRAMS
BH CV ECHO MEAS - LV MAX PG: 4.2 MMHG
BH CV ECHO MEAS - LV MEAN PG: 2.32 MMHG
BH CV ECHO MEAS - LV SYSTOLIC VOL/BSA (12-30): 23.5 CM2
BH CV ECHO MEAS - LV V1 MAX: 102.2 CM/SEC
BH CV ECHO MEAS - LV V1 VTI: 19.9 CM
BH CV ECHO MEAS - LVIDD: 4.4 CM
BH CV ECHO MEAS - LVIDS: 3 CM
BH CV ECHO MEAS - LVOT AREA: 3.4 CM2
BH CV ECHO MEAS - LVOT DIAM: 2.07 CM
BH CV ECHO MEAS - LVPWD: 1.43 CM
BH CV ECHO MEAS - MED PEAK E' VEL: 7.3 CM/SEC
BH CV ECHO MEAS - MV A DUR: 0.14 SEC
BH CV ECHO MEAS - MV A MAX VEL: 117.5 CM/SEC
BH CV ECHO MEAS - MV DEC SLOPE: 302.2 CM/SEC2
BH CV ECHO MEAS - MV DEC TIME: 0.25 MSEC
BH CV ECHO MEAS - MV E MAX VEL: 86.3 CM/SEC
BH CV ECHO MEAS - MV E/A: 0.73
BH CV ECHO MEAS - MV MAX PG: 5.5 MMHG
BH CV ECHO MEAS - MV MEAN PG: 1.93 MMHG
BH CV ECHO MEAS - MV P1/2T: 91.5 MSEC
BH CV ECHO MEAS - MV V2 VTI: 35.3 CM
BH CV ECHO MEAS - MVA(P1/2T): 2.4 CM2
BH CV ECHO MEAS - MVA(VTI): 1.89 CM2
BH CV ECHO MEAS - PA ACC TIME: 0.11 SEC
BH CV ECHO MEAS - PA PR(ACCEL): 29.1 MMHG
BH CV ECHO MEAS - PA V2 MAX: 111.3 CM/SEC
BH CV ECHO MEAS - PULM A REVS DUR: 0.14 SEC
BH CV ECHO MEAS - PULM A REVS VEL: 35.5 CM/SEC
BH CV ECHO MEAS - PULM DIAS VEL: 35 CM/SEC
BH CV ECHO MEAS - PULM S/D: 1.57
BH CV ECHO MEAS - PULM SYS VEL: 55 CM/SEC
BH CV ECHO MEAS - RAP SYSTOLE: 3 MMHG
BH CV ECHO MEAS - RV MAX PG: 0.97 MMHG
BH CV ECHO MEAS - RV V1 MAX: 49.3 CM/SEC
BH CV ECHO MEAS - RV V1 VTI: 9 CM
BH CV ECHO MEAS - SI(MOD-SP2): 31.7 ML/M2
BH CV ECHO MEAS - SI(MOD-SP4): 41.7 ML/M2
BH CV ECHO MEAS - SV(LVOT): 66.9 ML
BH CV ECHO MEAS - SV(MOD-SP2): 66 ML
BH CV ECHO MEAS - SV(MOD-SP4): 87 ML
BH CV ECHO MEAS - TAPSE (>1.6): 1.6 CM
BH CV ECHO MEASUREMENTS AVERAGE E/E' RATIO: 11.43
BH CV XLRA - RV BASE: 3.2 CM
BH CV XLRA - RV LENGTH: 8.8 CM
BH CV XLRA - RV MID: 2.07 CM
BH CV XLRA - TDI S': 9.6 CM/SEC
BUN SERPL-MCNC: 16 MG/DL (ref 8–23)
BUN/CREAT SERPL: 23.9 (ref 7–25)
CALCIUM SPEC-SCNC: 8.9 MG/DL (ref 8.6–10.5)
CHLORIDE SERPL-SCNC: 112 MMOL/L (ref 98–107)
CHOLEST SERPL-MCNC: 63 MG/DL (ref 0–200)
CO2 SERPL-SCNC: 21.9 MMOL/L (ref 22–29)
CREAT SERPL-MCNC: 0.67 MG/DL (ref 0.76–1.27)
DEPRECATED RDW RBC AUTO: 47.1 FL (ref 37–54)
EGFRCR SERPLBLD CKD-EPI 2021: 102.3 ML/MIN/1.73
ERYTHROCYTE [DISTWIDTH] IN BLOOD BY AUTOMATED COUNT: 15 % (ref 12.3–15.4)
GLUCOSE BLDC GLUCOMTR-MCNC: 107 MG/DL (ref 70–130)
GLUCOSE BLDC GLUCOMTR-MCNC: 205 MG/DL (ref 70–130)
GLUCOSE SERPL-MCNC: 116 MG/DL (ref 65–99)
HCT VFR BLD AUTO: 31.1 % (ref 37.5–51)
HDLC SERPL-MCNC: 30 MG/DL (ref 40–60)
HGB BLD-MCNC: 9.4 G/DL (ref 13–17.7)
LDLC SERPL CALC-MCNC: 18 MG/DL (ref 0–100)
LDLC/HDLC SERPL: 0.66 {RATIO}
LEFT ATRIUM VOLUME INDEX: 34.4 ML/M2
MAXIMAL PREDICTED HEART RATE: 153 BPM
MCH RBC QN AUTO: 25.7 PG (ref 26.6–33)
MCHC RBC AUTO-ENTMCNC: 30.2 G/DL (ref 31.5–35.7)
MCV RBC AUTO: 85 FL (ref 79–97)
PLATELET # BLD AUTO: 208 10*3/MM3 (ref 140–450)
PMV BLD AUTO: 11.5 FL (ref 6–12)
POTASSIUM SERPL-SCNC: 4 MMOL/L (ref 3.5–5.2)
QT INTERVAL: 385 MS
RBC # BLD AUTO: 3.66 10*6/MM3 (ref 4.14–5.8)
SINUS: 3.6 CM
SODIUM SERPL-SCNC: 141 MMOL/L (ref 136–145)
STRESS TARGET HR: 130 BPM
TRIGL SERPL-MCNC: 66 MG/DL (ref 0–150)
VLDLC SERPL-MCNC: 15 MG/DL (ref 5–40)
WBC NRBC COR # BLD: 10.97 10*3/MM3 (ref 3.4–10.8)

## 2022-10-28 PROCEDURE — 25010000002 PERFLUTREN (DEFINITY) 8.476 MG IN SODIUM CHLORIDE (PF) 0.9 % 10 ML INJECTION: Performed by: NURSE PRACTITIONER

## 2022-10-28 PROCEDURE — 93306 TTE W/DOPPLER COMPLETE: CPT

## 2022-10-28 PROCEDURE — 80061 LIPID PANEL: CPT | Performed by: STUDENT IN AN ORGANIZED HEALTH CARE EDUCATION/TRAINING PROGRAM

## 2022-10-28 PROCEDURE — 93306 TTE W/DOPPLER COMPLETE: CPT | Performed by: INTERNAL MEDICINE

## 2022-10-28 PROCEDURE — 93005 ELECTROCARDIOGRAM TRACING: CPT | Performed by: STUDENT IN AN ORGANIZED HEALTH CARE EDUCATION/TRAINING PROGRAM

## 2022-10-28 PROCEDURE — 93010 ELECTROCARDIOGRAM REPORT: CPT | Performed by: INTERNAL MEDICINE

## 2022-10-28 PROCEDURE — 82962 GLUCOSE BLOOD TEST: CPT

## 2022-10-28 PROCEDURE — 85027 COMPLETE CBC AUTOMATED: CPT | Performed by: STUDENT IN AN ORGANIZED HEALTH CARE EDUCATION/TRAINING PROGRAM

## 2022-10-28 PROCEDURE — 99232 SBSQ HOSP IP/OBS MODERATE 35: CPT | Performed by: NURSE PRACTITIONER

## 2022-10-28 PROCEDURE — 63710000001 INSULIN LISPRO (HUMAN) PER 5 UNITS: Performed by: STUDENT IN AN ORGANIZED HEALTH CARE EDUCATION/TRAINING PROGRAM

## 2022-10-28 PROCEDURE — 80048 BASIC METABOLIC PNL TOTAL CA: CPT | Performed by: STUDENT IN AN ORGANIZED HEALTH CARE EDUCATION/TRAINING PROGRAM

## 2022-10-28 RX ADMIN — ACETAMINOPHEN 650 MG: 325 TABLET, FILM COATED ORAL at 02:46

## 2022-10-28 RX ADMIN — CARVEDILOL 25 MG: 25 TABLET, FILM COATED ORAL at 08:54

## 2022-10-28 RX ADMIN — ACETAMINOPHEN 650 MG: 325 TABLET, FILM COATED ORAL at 12:39

## 2022-10-28 RX ADMIN — PERFLUTREN 2 ML: 6.52 INJECTION, SUSPENSION INTRAVENOUS at 11:53

## 2022-10-28 RX ADMIN — SACUBITRIL AND VALSARTAN 1 TABLET: 24; 26 TABLET, FILM COATED ORAL at 08:54

## 2022-10-28 RX ADMIN — INSULIN LISPRO 4 UNITS: 100 INJECTION, SOLUTION INTRAVENOUS; SUBCUTANEOUS at 12:40

## 2022-10-28 RX ADMIN — CLOPIDOGREL 75 MG: 75 TABLET, FILM COATED ORAL at 08:54

## 2022-10-28 RX ADMIN — ASPIRIN 81 MG: 81 TABLET, COATED ORAL at 08:54

## 2022-10-28 RX ADMIN — PREGABALIN 50 MG: 50 CAPSULE ORAL at 08:54

## 2022-11-01 ENCOUNTER — READMISSION MANAGEMENT (OUTPATIENT)
Dept: CALL CENTER | Facility: HOSPITAL | Age: 67
End: 2022-11-01

## 2022-11-02 ENCOUNTER — TELEPHONE (OUTPATIENT)
Dept: CARDIOLOGY | Facility: CLINIC | Age: 67
End: 2022-11-02

## 2022-11-02 NOTE — TELEPHONE ENCOUNTER
----- Message from ISABELLA Madrid sent at 11/1/2022  3:36 PM EDT -----  Let patient know his echo was stable.   ----- Message -----  From: Awais Arrington MD  Sent: 10/28/2022   3:39 PM EDT  To: ISABELLA Madrid

## 2022-11-02 NOTE — PROGRESS NOTES
Enter Query Response Below      Query Response: NSTEMI due to in-stent restenosis of proximal left circumflex             If applicable, please update the problem list.         Patient: Matt Cruz        : 1955  Account: [id number]           Admit Date:         How to Respond to this query:       a. Click New Note     b. Answer query within the yellow box.                c. Update the Problem List, if applicable.      If you have any questions about this query contact me at: [email or web address]    Dr. Green,    67 yr old male presented with chest pain and found to have a NSTEMI. Patient has a documented history of CAD s/p stent, HFrEF secondary to ischemic cardiomyopathy with recovered EF, hypertension, hyperlipidemia, and diabetes. Cardiac cath report showed in-stent restenosis of proximal left circumflex and treated with balloon angioplasty x2, Plavix 600mg x1 dose in lab then 75mg daily, Aspirin, Lipitor and Coreg.    Can the patient's condition be further specified as:    - NSTEMI due to in-stent restenosis of proximal left circumflex  - NSTEMI    - Other (please specify) ____________  - Unable to determine    By submitting this query, we are merely seeking further clarification of documentation to accurately reflect all conditions that you are monitoring, evaluating, treating or that extend the hospitalization or utilize additional resources of care. Please utilize your independent clinical judgment when addressing the question(s) above.     This query and your response, once completed, will be entered into the legal medical record.    Sincerely,  Zak Mazariegos RN  Clinical Documentation Integrity Program

## 2022-11-02 NOTE — TELEPHONE ENCOUNTER
Called and left VM. Will continue to try to reach patient.     Latonia Cordero RN  [place] [facility]

## 2022-11-03 NOTE — TELEPHONE ENCOUNTER
Called and left VM. Will continue to try to reach patient.     Latonia Cordero RN  Triage [facility]

## 2022-11-04 NOTE — TELEPHONE ENCOUNTER
Called and left VM. Will continue to try to reach patient.     Latonia Cordero RN  [place] [facility]

## 2022-11-05 NOTE — PROGRESS NOTES
[facility] Clinical Pharmacy Services: National Cardiology Data Registry (NCDR) Medication Review    Matt Cruz is s/p angioplasty for NSTEMI. Pharmacy to review discharge medications to make sure appropriate medications have been prescribed.    Patient has been discharged on the following:  · Aspirin EC 81 mg daily  · High Intensity Statin: Atorvastatin 80 mg qHS  · Beta-blocker: Carvedilol 25 mg BID  · P2Y12 Inhibitor: Clopidogrel 75 mg daily  · LVEF=61%: Entresto 24-26 BID    These medications meet the requirements for NCDR discharge medication for chest pain and MI.    Niya Drake, Pharm.D., [facility]  Clinical Pharmacist  Phone Extension #[phone]

## 2022-11-09 ENCOUNTER — READMISSION MANAGEMENT (OUTPATIENT)
Dept: CALL CENTER | Facility: HOSPITAL | Age: 67
End: 2022-11-09

## 2022-11-09 NOTE — OUTREACH NOTE
AMI Week 2 Survey    Flowsheet Row Responses   [facility] [place] facility patient discharged from? [place]   Does the patient have one of the following disease processes/diagnoses(primary or secondary)? Acute MI (STEMI,NSTEMI)   Week 2 attempt successful? No   Unsuccessful attempts Attempt 1          EDWIN FRAGOSO - Registered Nurse

## 2022-11-14 RX ORDER — FUROSEMIDE 20 MG/1
TABLET ORAL
Qty: 90 TABLET | Refills: 3 | Status: SHIPPED | OUTPATIENT
Start: 2022-11-14

## 2022-11-14 RX ORDER — CLOPIDOGREL BISULFATE 75 MG/1
TABLET ORAL
Qty: 90 TABLET | Refills: 3 | Status: SHIPPED | OUTPATIENT
Start: 2022-11-14

## 2022-11-14 RX ORDER — CARVEDILOL 25 MG/1
TABLET ORAL
Qty: 180 TABLET | Refills: 2 | Status: SHIPPED | OUTPATIENT
Start: 2022-11-14

## 2022-11-17 ENCOUNTER — TELEPHONE (OUTPATIENT)
Dept: CARDIAC REHAB | Facility: HOSPITAL | Age: 67
End: 2022-11-17

## 2023-01-03 RX ORDER — ATORVASTATIN CALCIUM 80 MG/1
TABLET, FILM COATED ORAL
Qty: 90 TABLET | Refills: 2 | Status: SHIPPED | OUTPATIENT
Start: 2023-01-03

## 2023-03-31 RX ORDER — SACUBITRIL AND VALSARTAN 24; 26 MG/1; MG/1
TABLET, FILM COATED ORAL
Qty: 180 TABLET | Refills: 3 | Status: SHIPPED | OUTPATIENT
Start: 2023-03-31

## 2023-03-31 NOTE — TELEPHONE ENCOUNTER
Rx Refill Note  Requested Prescriptions     Pending Prescriptions Disp Refills    Entresto 24-26 MG tablet [Pharmacy Med Name: ENTRESTO 24 MG-26 MG TABLET] 180 tablet 3     Sig: TAKE ONE TABLET BY MOUTH EVERY 12 HOURS      Last office visit with prescribing clinician: 3/4/2022   Last telemedicine visit with prescribing clinician: Visit date not found   Next office visit with prescribing clinician: Visit date not found                         Would you like a call back once the refill request has been completed: [] Yes [] No    If the office needs to give you a call back, can they leave a voicemail: [] Yes [] No    Pratima Gonzalez MA  03/31/23, 12:41 EDT

## 2023-04-05 NOTE — OUTREACH NOTE
Prep Survey    Flowsheet Row Responses   [facility] facility patient discharged from? [facility]   Is LACE score < 7 ? No   Emergency Room discharge w/ pulse ox? No   Eligibility Readm Mgmt   Discharge diagnosis NSTEMI    Does the patient have one of the following disease processes/diagnoses(primary or secondary)? Acute MI (STEMI,NSTEMI)   Does the patient have Home health ordered? No   Is there a DME ordered? No   Prep survey completed? Yes          PO HENDERSON - Registered Nurse         oral

## 2023-05-30 RX ORDER — POTASSIUM CHLORIDE 750 MG/1
TABLET, FILM COATED, EXTENDED RELEASE ORAL
Qty: 180 TABLET | Refills: 3 | Status: SHIPPED | OUTPATIENT
Start: 2023-05-30

## 2023-10-03 RX ORDER — NITROGLYCERIN 0.4 MG/1
TABLET SUBLINGUAL
Qty: 25 TABLET | Refills: 1 | Status: SHIPPED | OUTPATIENT
Start: 2023-10-03

## 2023-11-06 ENCOUNTER — OFFICE VISIT (OUTPATIENT)
Dept: CARDIOLOGY | Facility: CLINIC | Age: 68
End: 2023-11-06
Payer: MEDICARE

## 2023-11-06 VITALS
BODY MASS INDEX: 32.93 KG/M2 | WEIGHT: 209.8 LBS | DIASTOLIC BLOOD PRESSURE: 64 MMHG | HEIGHT: 67 IN | SYSTOLIC BLOOD PRESSURE: 110 MMHG | HEART RATE: 62 BPM | OXYGEN SATURATION: 96 %

## 2023-11-06 DIAGNOSIS — E11.59 TYPE 2 DIABETES MELLITUS WITH OTHER CIRCULATORY COMPLICATION, WITH LONG-TERM CURRENT USE OF INSULIN: ICD-10-CM

## 2023-11-06 DIAGNOSIS — I25.118 CORONARY ARTERY DISEASE OF NATIVE ARTERY OF NATIVE HEART WITH STABLE ANGINA PECTORIS: Primary | ICD-10-CM

## 2023-11-06 DIAGNOSIS — I25.5 ISCHEMIC CARDIOMYOPATHY: ICD-10-CM

## 2023-11-06 DIAGNOSIS — E78.2 MIXED HYPERLIPIDEMIA: ICD-10-CM

## 2023-11-06 DIAGNOSIS — I10 ESSENTIAL HYPERTENSION: ICD-10-CM

## 2023-11-06 DIAGNOSIS — Z79.4 TYPE 2 DIABETES MELLITUS WITH OTHER CIRCULATORY COMPLICATION, WITH LONG-TERM CURRENT USE OF INSULIN: ICD-10-CM

## 2023-11-06 PROCEDURE — 99214 OFFICE O/P EST MOD 30 MIN: CPT | Performed by: INTERNAL MEDICINE

## 2023-11-06 PROCEDURE — 1159F MED LIST DOCD IN RCRD: CPT | Performed by: INTERNAL MEDICINE

## 2023-11-06 PROCEDURE — 93000 ELECTROCARDIOGRAM COMPLETE: CPT | Performed by: INTERNAL MEDICINE

## 2023-11-06 PROCEDURE — 3078F DIAST BP <80 MM HG: CPT | Performed by: INTERNAL MEDICINE

## 2023-11-06 PROCEDURE — 3074F SYST BP LT 130 MM HG: CPT | Performed by: INTERNAL MEDICINE

## 2023-11-06 PROCEDURE — 1160F RVW MEDS BY RX/DR IN RCRD: CPT | Performed by: INTERNAL MEDICINE

## 2023-11-06 RX ORDER — INSULIN GLARGINE 100 [IU]/ML
30 INJECTION, SOLUTION SUBCUTANEOUS DAILY
COMMUNITY
Start: 2023-10-19

## 2023-11-06 RX ORDER — FINASTERIDE 5 MG/1
5 TABLET, FILM COATED ORAL DAILY
COMMUNITY
Start: 2023-10-19

## 2023-11-06 RX ORDER — PANTOPRAZOLE SODIUM 20 MG/1
20 TABLET, DELAYED RELEASE ORAL DAILY
COMMUNITY
Start: 2023-10-19

## 2023-11-06 RX ORDER — LANOLIN ALCOHOL/MO/W.PET/CERES
1000 CREAM (GRAM) TOPICAL DAILY
COMMUNITY
Start: 2023-10-19

## 2023-11-06 RX ORDER — DULOXETIN HYDROCHLORIDE 30 MG/1
30 CAPSULE, DELAYED RELEASE ORAL DAILY
COMMUNITY
Start: 2023-10-19 | End: 2024-10-18

## 2023-11-06 NOTE — PROGRESS NOTES
CARDIOLOGY    Ashley Alba MD    ENCOUNTER DATE:  11/06/2023    Matt Cruz / 68 y.o. / male        CHIEF COMPLAINT / REASON FOR OFFICE VISIT     Congestive Heart Failure and Chest Pain      HISTORY OF PRESENT ILLNESS       HPI    Matt Cruz is a 68 y.o. male     He has coronary disease, status post a stent to the mid LAD, hypertension, TIA, diabetes, hyperlipidemia, and carotid stenosis. He was admitted in April 2018 with strokelike symptoms.     In 2010, he did have cardiac cath showing 3-vessel disease with a 90% stenosis in the mid LAD, RCA occlusion and 30% stenosis of the left circumflex with an EF of 50% to 55%, angioplasty and drug-eluting stent of the mid LAD. However, the angioplasty was unsuccessful of the RCA. Medical management was recommended.  An echocardiogram was performed on 04/13/2018 showing mild concentric hypertrophy and an EF of 57%. He also had some anginal chest pain. He did undergo a cardiac catheterization showing severe 3-vessel coronary disease with an occluded RCA, high-grade mid left circumflex, and diffuse LAD and patent LAD. He had a drug-eluting stent placed to the mid left circumflex, placed on dual antiplatelet therapy. He was sent home with a Zio patch which did not show arrhythmia. CT angiogram of the head and neck was normal without acute intracranial hemorrhage or stroke. He did have a severe focal stenosis of the left P2 segment.     He was admitted in September 2021 with angina and ruled in for non-ST elevation myocardial infarction.  He had a heart catheterization in September 2021 which showed chronic total occlusion of the right coronary artery.  There was severe stenosis of the proximal and mid circumflex.The left anterior descending artery had up to a 50% stenosis distally.  A drug-eluting stent was placed to the mid circumflex.  His LV function appeared to be 25%.  Echocardiogram in September 2021 put his ejection fraction at 30 to 35% with grade 2  "diastolic dysfunction.  No significant valve disease.     Echocardiogram March 4, 2022 showed his LV function had normalized with an ejection fraction of 60 to 65% and no significant valve disease.    He was admitted in October 2022 with a non-STEMI.  Heart catheterization showed some mild in-stent stenosis to the mid LAD.  The mid to distal LAD had severe diffuse disease.  The circumflex had a 90% stenosis at the site of a prior stent.  The right coronary artery had a known chronic total occlusion.  A stent was placed to the circumflex.  Echocardiogram October 2022 showed normal LV function ejection fraction 61%, grade 1 diastolic dysfunction, left atrial enlargement.  There was calcification of the aortic valve without significant stenosis or regurgitation.    He said he had some money problems and was not taking his medications for a while but he is back on all of his medicines.  He is not having chest pain, shortness of breath, palpitations or edema.    REVIEW OF SYSTEMS     Review of Systems   Constitutional: Negative for chills, fever, weight gain and weight loss.   Cardiovascular:  Negative for leg swelling.   Respiratory:  Negative for cough, snoring and wheezing.    Hematologic/Lymphatic: Negative for bleeding problem. Does not bruise/bleed easily.   Skin:  Negative for color change.   Musculoskeletal:  Negative for falls, joint pain and myalgias.   Gastrointestinal:  Negative for melena.   Genitourinary:  Negative for hematuria.   Neurological:  Negative for excessive daytime sleepiness.   Psychiatric/Behavioral:  Negative for depression. The patient is not nervous/anxious.          VITAL SIGNS     Visit Vitals  /64   Pulse 62   Ht 170.2 cm (67\")   Wt 95.2 kg (209 lb 12.8 oz)   SpO2 96%   BMI 32.86 kg/m²         Wt Readings from Last 3 Encounters:   11/06/23 95.2 kg (209 lb 12.8 oz)   10/28/22 97.5 kg (215 lb)   09/06/22 99.3 kg (219 lb)     Body mass index is 32.86 kg/m².      PHYSICAL EXAMINATION "     Constitutional:       General: Not in acute distress.  Neck:      Vascular: No carotid bruit or JVD.   Pulmonary:      Effort: Pulmonary effort is normal.      Breath sounds: Normal breath sounds.   Cardiovascular:      Normal rate. Regular rhythm.      Murmurs: There is no murmur.   Psychiatric:         Mood and Affect: Mood and affect normal.           REVIEWED DATA       ECG 12 Lead    Date/Time: 11/6/2023 1:50 PM  Performed by: Ashley Alba MD    Authorized by: Ashley Alba MD  Comparison: compared with previous ECG from 10/28/2022  Similar to previous ECG  Rhythm: sinus rhythm  BPM: 62  Conduction: conduction normal  ST Segments: ST segments normal  T Waves: T waves normal  Other findings: non-specific ST-T wave changes    Clinical impression: abnormal EKG                Lab Results   Component Value Date    GLUCOSE 116 (H) 10/28/2022    BUN 16 10/28/2022    CREATININE 0.67 (L) 10/28/2022    EGFR 102.3 10/28/2022    BCR 23.9 10/28/2022    K 4.0 10/28/2022    CO2 21.9 (L) 10/28/2022    CALCIUM 8.9 10/28/2022    ALBUMIN 3.70 10/27/2022    BILITOT 0.2 10/27/2022    AST 25 10/27/2022    ALT 13 10/27/2022       ASSESSMENT & PLAN      Diagnosis Plan   1. Coronary artery disease of native artery of native heart with stable angina pectoris        2. Ischemic cardiomyopathy        3. Mixed hyperlipidemia        4. Essential hypertension        5. Type 2 diabetes mellitus with other circulatory complication, with long-term current use of insulin              1.  Coronary artery disease he has a chronic total occlusion of the right coronary artery.  He had 2 drug-eluting stents to his circumflex in September 2021.  He had another drug-eluting stent to the circumflex in October 2022.  He has normal LV function by echo October 2022 ejection fraction 61%.  2.  Ischemic cardiomyopathy.  Ejection fraction 32% September 2021.  Ejection fraction normalized with an EF of 61% in October 2022.  Continue carvedilol and  Entresto.  3.  Carotid artery disease with 50% narrowing of the right internal carotid artery by CTA of the head neck April 2018  4.  Type 2 diabetes.  I agree with Jardiance.  He is also on insulin.  5.  Hyperlipidemia.  Goal LDL is less than 70.  Continue high-dose atorvastatin.     Continue current medications.  Follow-up with Erika in 1 year.      Orders Placed This Encounter   Procedures    ECG 12 Lead     This order was created via procedure documentation     Order Specific Question:   Release to patient     Answer:   Routine Release [[id number]]           MEDICATIONS         Discharge Medications            Accurate as of November 6, 2023  1:51 PM. If you have any questions, ask your nurse or doctor.                Continue These Medications        Instructions Start Date   aspirin 81 MG EC tablet   81 mg, Oral, Daily      atorvastatin 80 MG tablet  Commonly known as: LIPITOR   TAKE ONE TABLET BY MOUTH ONCE NIGHTLY      carvedilol 25 MG tablet  Commonly known as: COREG   TAKE ONE TABLET BY MOUTH EVERY 12 HOURS      Cholecalciferol 50 MCG (2000 UT) capsule   1 capsule, Oral, Daily      clopidogrel 75 MG tablet  Commonly known as: PLAVIX   TAKE ONE TABLET BY MOUTH DAILY      DULoxetine 30 MG capsule  Commonly known as: CYMBALTA   30 mg, Oral, Daily      empagliflozin 10 MG tablet tablet  Commonly known as: JARDIANCE   1 tablet, Oral, Daily      Entresto 24-26 MG tablet  Generic drug: sacubitril-valsartan   TAKE ONE TABLET BY MOUTH EVERY 12 HOURS      finasteride 5 MG tablet  Commonly known as: PROSCAR   5 mg, Oral, Daily      furosemide 20 MG tablet  Commonly known as: LASIX   TAKE ONE TABLET BY MOUTH DAILY      insulin detemir 100 UNIT/ML injection  Commonly known as: LEVEMIR   30 Units, Subcutaneous, Nightly      Lantus SoloStar 100 UNIT/ML injection pen  Generic drug: Insulin Glargine   30 Units, Subcutaneous, Daily      metFORMIN 1000 MG tablet  Commonly known as: GLUCOPHAGE   1,000 mg, Oral       nitroglycerin 0.4 MG SL tablet  Commonly known as: NITROSTAT   DISSOLVE 1 TAB UNDER TONGUE FOR CHEST PAIN - IF PAIN REMAINS AFTER 5 MIN, CALL 911 AND REPEAT DOSE. MAX 3 TABS IN 15 MINUTES      OneTouch Delica Plus [id number] misc   Use to test blood glucose four times daily      OneTouch Verio Flex System w/Device kit   Use to test blood glucose four times daily      OneTouch Verio test strip  Generic drug: glucose blood   Use to test blood glucose four times daily      pantoprazole 20 MG EC tablet  Commonly known as: PROTONIX   20 mg, Oral, Daily      potassium chloride 10 MEQ CR tablet   TAKE ONE TABLET BY MOUTH TWICE A DAY WITH MEALS      pregabalin 50 MG capsule  Commonly known as: LYRICA   50 mg, Oral      vitamin B-12 1000 MCG tablet  Commonly known as: CYANOCOBALAMIN   1,000 mcg, Oral, Daily                 Ashley Alba MD  11/06/23  13:51 EST    Part of this note may be an electronic transcription/translation of spoken language to printed text using the Dragon dictation system.

## 2023-12-05 ENCOUNTER — TELEPHONE (OUTPATIENT)
Dept: CARDIOLOGY | Facility: CLINIC | Age: 68
End: 2023-12-05
Payer: COMMERCIAL

## 2023-12-05 NOTE — TELEPHONE ENCOUNTER
[facility] is requesting Cardiac Clearance for Mr Cruz. He is scheduled to have a right shockwave lithotripsy with stent placement ASAP. Patient is currently on Aspirin and Plavix and will need to be off of both medications for SEVEN days Prior to the procedure.       Patient was last seen by  Dr Alba on 11/6/2023  His Next Scheduled appointment is with Erika on 11/13/2024  Echo done on 10/28/2022  Cardiac Cath done 10/27/2022     FAX [phone]  Dr Davis       Thanks  SENIA Andujar   12/5/2023  9:37 AM

## 2023-12-08 RX ORDER — FUROSEMIDE 20 MG/1
TABLET ORAL
Qty: 90 TABLET | Refills: 3 | Status: SHIPPED | OUTPATIENT
Start: 2023-12-08

## 2024-01-26 NOTE — CASE COMMUNICATION
THE skilled nursing VISIT ON  FOR THE ABOVE PATIENT WAS MISSED DUE TO inability to reach patient via phone, THEREFORE, THE PRESCRIBED FREQUENCY OF VISITS WAS NOT MET.             IF YOU HAVE QUESTIONS OR WOULD LIKE FURTHER INFORMATION AOUT THIS PATIENT, PLEASE CONTACT US [phone]   pain Medication Blood work

## 2024-04-29 RX ORDER — SACUBITRIL AND VALSARTAN 24; 26 MG/1; MG/1
TABLET, FILM COATED ORAL
Qty: 60 TABLET | Refills: 11 | Status: SHIPPED | OUTPATIENT
Start: 2024-04-29

## 2024-04-29 NOTE — TELEPHONE ENCOUNTER
Rx Refill Note  Requested Prescriptions     Pending Prescriptions Disp Refills    Entresto 24-26 MG tablet [Pharmacy Med Name: ENTRESTO 24 MG-26 MG TABLET] 60 tablet      Sig: TAKE ONE TABLET BY MOUTH EVERY 12 HOURS      Last office visit with prescribing clinician: 11/6/2023   Last telemedicine visit with prescribing clinician: Visit date not found   Next office visit with prescribing clinician: Visit date not found                         Would you like a call back once the refill request has been completed: [] Yes [] No    If the office needs to give you a call back, can they leave a voicemail: [] Yes [] No    Pratima Gonzalez CMA  04/29/24, 12:07 EDT

## 2024-06-26 RX ORDER — POTASSIUM CHLORIDE 750 MG/1
10 TABLET, FILM COATED, EXTENDED RELEASE ORAL 2 TIMES DAILY WITH MEALS
Qty: 180 TABLET | Refills: 3 | Status: SHIPPED | OUTPATIENT
Start: 2024-06-26

## 2024-08-13 RX ORDER — POTASSIUM CHLORIDE 750 MG/1
10 TABLET, FILM COATED, EXTENDED RELEASE ORAL 2 TIMES DAILY WITH MEALS
Qty: 180 TABLET | Refills: 3 | Status: SHIPPED | OUTPATIENT
Start: 2024-08-13

## 2024-08-26 ENCOUNTER — TELEPHONE (OUTPATIENT)
Dept: CARDIOLOGY | Facility: CLINIC | Age: 69
End: 2024-08-26
Payer: COMMERCIAL

## 2024-08-26 NOTE — TELEPHONE ENCOUNTER
Nichelle with Dr. Elise's office called wanting cardiac clearance. She is sending it over now to the main fax, but it also looks like someone scanned the clearance under the media tab. It is dated 8/8/24, but it has not been filled out. Please address.     Latonia Cuello RN  Triage [facility]

## 2024-11-13 ENCOUNTER — TELEPHONE (OUTPATIENT)
Dept: CARDIOLOGY | Facility: CLINIC | Age: 69
End: 2024-11-13

## 2024-11-13 NOTE — TELEPHONE ENCOUNTER
Caller: Matt Cruz    Relationship to patient: Self    Best call back number: [phone]    Patient is needing: HUB RESCHEDULED PATIENT APPOINTMENT ON 11.13.24 WITH JASMIN CASANOVA TO FIRST AVAILABLE 01.07.25 WITH JASMIN CASANOVA AND ADDED TO WAIT LIST. PLEASE CONTACT PATIENT TO RESCHEDULE  IF THEY NEED TO BE SEEN SOONER. THANK YOU.

## 2025-01-03 ENCOUNTER — TELEPHONE (OUTPATIENT)
Dept: CARDIOLOGY | Facility: CLINIC | Age: 70
End: 2025-01-03

## 2025-01-03 NOTE — TELEPHONE ENCOUNTER
Caller: Matt Cruz    Relationship to patient: Self    Best call back number: [phone]    Patient is needing: HUB RESCHEDULED PATIENT APPOINTMENT ON 01.07.25 WITH JASMIN MASON TO 03.26.25 WITH JASMIN MASON AND ADDED TO WAIT LIST. PLEASE CONTACT PATIENT TO RESCHEDULE  IF THEY NEED TO BE SEEN SOONER. THANK YOU.

## 2025-01-08 RX ORDER — FUROSEMIDE 20 MG/1
20 TABLET ORAL DAILY
Qty: 90 TABLET | Refills: 3 | Status: SHIPPED | OUTPATIENT
Start: 2025-01-08

## 2025-02-19 ENCOUNTER — TELEPHONE (OUTPATIENT)
Dept: CARDIOLOGY | Facility: CLINIC | Age: 70
End: 2025-02-19

## 2025-02-19 NOTE — TELEPHONE ENCOUNTER
Caller: Matt Cruz    Relationship: Self    Best call back number: [phone]      What was the call regarding: PATIENT WAS GIVEN PRESCRIPTION FOR ENTRESTO BUT INSURANCE DOESN'T WANT TO COVER IT AND IS OVER 400 DOLLARS PER FILL PLEASE CALL WITH ALTERNATIVE OR FINANCIAL ASSISTANCE INFO.

## 2025-03-08 NOTE — CONSULTS
"Adult Nutrition  Assessment/PES    Patient Name:  Matt Cruz  YOB: 1955  MRN: [id number]  Admit Date:  4/13/2018    Assessment Date:  4/17/2018    Comments:  Late entry. Provided patient and wife on diet education. Pt and wife have been educated on diet before but compliance is poor.  Encouraged good compliance for better diabetes control.          Adult Nutrition Assessment     Row Name 04/17/18 0917       Nutrition Prescription PO    Current PO Diet Regular    Common Modifiers Consistent Carbohydrate;Cardiac    Row Name 04/17/18 0916       Physical Findings    Overall Physical Appearance obese    Skin --   intact       Calculation Measurements    Weight Used For Calculations 101 kg (222 lb 10.6 oz)       Estimated/Assessed Needs    Additional Documentation Calorie Requirements (Group);Protein Requirements (Group);Fluid Requirements (Group)       Calorie Requirements    Estimated Calorie Requirement (kcal/day) 2525   25kcal/kg       KCAL/KG    14 Kcal/Kg (kcal) 1414    15 Kcal/Kg (kcal) 1515    18 Kcal/Kg (kcal) 1818    20 Kcal/Kg (kcal) 2020    25 Kcal/Kg (kcal) 2525    30 Kcal/Kg (kcal) 3030    35 Kcal/Kg (kcal) 3535    40 Kcal/Kg (kcal) 4040    45 Kcal/Kg (kcal) 4545    50 Kcal/Kg (kcal) 5050       [place]-St. Jeor Equation    RMR ([place]-St. Jeor Equation) 1768.75       Protein Requirements    Est Protein Requirement Amount (gms/kg) 0.8 gm protein    Estimated Protein Requirements (gms/day) 80.8       Fluid Requirements    Estimated Fluid Requirements (mL/day) 2525    RDA Method (mL) 2525    Dry[place]-Segar Method (over 20 kg) 3520    Row Name 04/17/18 0915       Anthropometrics    Height 170.2 cm (67.01\")    Weight 101 kg (221 lb 9 oz)       Ideal Body Weight (IBW)    Ideal Body Weight (IBW) (kg) 68.12    % Ideal Body Weight 147.53       Body Mass Index (BMI)    BMI (kg/m2) 34.77    BMI Assessment BMI 30-34.9: obesity grade I       IBW Adjustment, Para/Tetraplegia    5% Adjustment, Para " (IBW) 64.71    10% Adjustment, Para (IBW) 61.31    10% Adjustment, Tetra (IBW) 61.31    15% Adjustment, Tetra (IBW) 57.9       Labs/Procedures/Meds    Lab Results Reviewed reviewed    Lab Results Comments glu, A1C, chol, trig, cr, wbc    Row Name 04/17/18 0914       Reason for Assessment    Reason For Assessment physician consult   late entry, saw 4/17    Diagnosis diabetes diagnosis/complications          Problem/Interventions:        Problem 1     Row Name 04/17/18 0918       Nutrition Diagnoses Problem 1    Problem 1 Knowledge Deficit    Etiology (related to) Medical Diagnosis    Endocrine DM                    Intervention Goal     Row Name 04/17/18 0919       Intervention Goal    General Maintain nutrition    PO Tolerate PO    Weight Appropriate weight loss            Nutrition Intervention     Row Name 04/17/18 0919       Nutrition Intervention    RD/Tech Action Follow Tx progress;Care plan reviewd              Education/Evaluation     Row Name 04/17/18 0919       Education    Education Provided education regarding   late entry, education provided 4/16    Provided education regarding Healthy eating for diabetes       Monitor/Evaluation    Monitor Per protocol        Electronically signed by:  Iraida Smith RD  04/17/18 9:20 AM   no

## 2025-03-26 ENCOUNTER — OFFICE VISIT (OUTPATIENT)
Dept: CARDIOLOGY | Age: 70
End: 2025-03-26

## 2025-03-26 VITALS
WEIGHT: 208 LBS | SYSTOLIC BLOOD PRESSURE: 108 MMHG | HEIGHT: 67 IN | BODY MASS INDEX: 32.65 KG/M2 | HEART RATE: 66 BPM | DIASTOLIC BLOOD PRESSURE: 68 MMHG

## 2025-03-26 DIAGNOSIS — E78.2 MIXED HYPERLIPIDEMIA: ICD-10-CM

## 2025-03-26 DIAGNOSIS — R06.02 SHORTNESS OF BREATH: ICD-10-CM

## 2025-03-26 DIAGNOSIS — I25.118 CORONARY ARTERY DISEASE OF NATIVE ARTERY OF NATIVE HEART WITH STABLE ANGINA PECTORIS: ICD-10-CM

## 2025-03-26 DIAGNOSIS — I25.5 ISCHEMIC CARDIOMYOPATHY: Primary | ICD-10-CM

## 2025-03-26 DIAGNOSIS — I10 ESSENTIAL HYPERTENSION: ICD-10-CM

## 2025-03-26 PROCEDURE — 93000 ELECTROCARDIOGRAM COMPLETE: CPT | Performed by: NURSE PRACTITIONER

## 2025-03-26 PROCEDURE — 99214 OFFICE O/P EST MOD 30 MIN: CPT | Performed by: NURSE PRACTITIONER

## 2025-03-26 RX ORDER — CARVEDILOL 25 MG/1
25 TABLET ORAL EVERY 12 HOURS
Qty: 180 TABLET | Refills: 2 | Status: SHIPPED | OUTPATIENT
Start: 2025-03-26

## 2025-03-26 RX ORDER — SACUBITRIL AND VALSARTAN 24; 26 MG/1; MG/1
1 TABLET, FILM COATED ORAL EVERY 12 HOURS
Qty: 60 TABLET | Refills: 11 | Status: SHIPPED | OUTPATIENT
Start: 2025-03-26

## 2025-03-26 NOTE — PROGRESS NOTES
Date of Office Visit: 25  Encounter Provider: ISABELLA Mclean  Place of Service: [facility]  Patient Name: Matt Cruz  :1955    Chief Complaint   Patient presents with    Coronary artery disease involving native heart without laurita    Cardiomyopathy    Follow-up   :     HPI: Matt Cruz is a 69 y.o. male  with hypertension, hyperlipidemia, diabetes mellitus, aortic valve sclerosis, coronary artery disease status post CABG times 4 May 2024, history of non-STEMI, obesity, and ischemic cardiomyopathy.  He is followed by Dr. Alba.  I will visit with him in follow-up and have reviewed his medical record.  He has history of a stent to the mid LAD in .  At that time angioplasty was unsuccessful of the right coronary artery.  In 2018 he had some strokelike symptoms and CT of the head was negative for acute disease.  MRI showed no acute infarct.  He was hypertensive and also complained of chest pain.     He is followed by Dr. Ashley Alba.  I will visit with him in follow-up and have reviewed his medical record.      He was admitted 2021 with angina.  He ruled in for non-STEMI.  Echocardiogram revealed ejection fraction approximately 33%, extensive regional wall motion abnormalities, grade 2 diastolic dysfunction and moderately calcified aortic valve with no significant regurgitation or stenosis.  He had cardiac catheterization which revealed nonobstructive coronary artery disease of the left main and left anterior descending artery.  Left circumflex had a 95% stenosis in the proximal segment followed by a 75% stenosis in the mid segment.  Marginal branches had nonobstructive disease.  The right coronary artery was completely occluded proximally with left to right collateral flow.  He received 2 drug-eluting stents at the proximal and mid circumflex.  He was started on goal-directed medical therapy for ischemic cardiomyopathy.  His home valsartan  was stopped and he was started on Entresto.  He was also started on furosemide.  His hemoglobin A1c was not controlled.  Follow-up echo March 2022 showed ejection fraction returned to normal.  He had aortic valve sclerosis without regurgitation or stenosis    In May 2024 he was admitted at [facility] with chest pain.  He had elevated troponin during that admission and also had issues with hematuria.  He had cardiac catheterization which showed proximal LAD stent probably patent with severe diffuse mid to distal LAD stenosis.  There was a patent proximal left circumflex stent with severe OM1 branch stenosis and occluded OM 2 branch with left to left collaterals, severe distal left circumflex stenosis and dominant right coronary artery with  and faint left-to-right collaterals.  He had an echo in March of that year which showed normal left ventricular systolic function.  Severe mid LAD disease and evaluation by cardiothoracic surgeon was recommended.  He had CABG x 4 with LIMA to distal LAD, SVG to third obtuse marginal and sequential SVG to posterior descending artery and posterior LV branch of the circumflex with right leg harvest on 5/9/2024 with Dr. Jordy Mckay.  He had an unremarkable postop course.    He was hospitalized in January 2025 at [facility].  He had acute on chronic congestive heart failure and was treated for pneumonia.  His swelling went down tremendously.    He presents today for reassessment.  He is had a little more swelling than usual.  Supine with his medication.  He ambulates with a cane.  No recent chest pain.  No dizziness or palpitation.  He has shortness of breath if he walks for prolonged period of time.  No near-syncope or syncope.  No recent hematuria.        No Known Allergies        Family and social history reviewed.     ROS  All other systems were reviewed and are negative          Objective:     Vitals:    03/26/25 1017 03/26/25 1055   BP: (!) 80/60 108/68   BP  "Location: Right arm    Patient Position: Sitting    Cuff Size: Large Adult    Pulse: 66    Weight: 94.3 kg (208 lb)    Height: 170.2 cm (67\")      Body mass index is 32.58 kg/m².    PHYSICAL EXAM:  Pulmonary:      Effort: Pulmonary effort is normal.      Breath sounds: Examination of the right-lower field reveals decreased breath sounds. Examination of the left-lower field reveals decreased breath sounds. Decreased breath sounds present.   Cardiovascular:      Normal rate. Regular rhythm.      Comments: Compression socks in place bilateral  Edema:     Pretibial: bilateral 1+ edema of the pretibial area.          ECG 12 Lead    Date/Time: 3/26/2025 10:41 AM  Performed by: Erika Clifford APRN    Authorized by: Erika Clifford APRN  Comparison: compared with previous ECG   Similar to previous ECG  Rhythm: sinus rhythm  Rate: normal  Conduction: 1st degree AV block  T inversion: III, aVF and V6  QRS axis: right    Clinical impression: abnormal EKG  Comments: No significant change            Current Outpatient Medications   Medication Sig Dispense Refill    atorvastatin (LIPITOR) 80 MG tablet TAKE ONE TABLET BY MOUTH ONCE NIGHTLY 90 tablet 2    Blood Glucose Monitoring Suppl (OneTouch Verio) w/Device kit Use to test blood glucose four times daily 1 kit 0    carvedilol (COREG) 25 MG tablet Take 1 tablet by mouth Every 12 (Twelve) Hours. 180 tablet 2    Cholecalciferol 50 MCG (2000 UT) capsule Take 1 capsule by mouth Daily.      clopidogrel (PLAVIX) 75 MG tablet TAKE ONE TABLET BY MOUTH DAILY 90 tablet 3    empagliflozin (JARDIANCE) 10 MG tablet tablet Take 1 tablet by mouth Daily.      finasteride (PROSCAR) 5 MG tablet Take 1 tablet by mouth Daily.      furosemide (LASIX) 20 MG tablet TAKE 1 TABLET BY MOUTH DAILY 90 tablet 3    glucose blood (OneTouch Verio) test strip Use to test blood glucose four times daily 100 each 0    Lancets (OneTouch Delica Plus [id number]) misc Use to test blood glucose four times daily 100 each 0 "    Lantus SoloStar 100 UNIT/ML injection pen Inject 30 Units under the skin into the appropriate area as directed Daily.      metFORMIN (GLUCOPHAGE) 1000 MG tablet Take 1 tablet by mouth.      nitroglycerin (NITROSTAT) 0.4 MG SL tablet DISSOLVE 1 TAB UNDER TONGUE FOR CHEST PAIN - IF PAIN REMAINS AFTER 5 MIN, CALL 911 AND REPEAT DOSE. MAX 3 TABS IN 15 MINUTES 25 tablet 1    pantoprazole (PROTONIX) 20 MG EC tablet Take 1 tablet by mouth Daily.      potassium chloride 10 MEQ CR tablet TAKE 1 TABLET BY MOUTH TWICE A DAY WITH MEALS 180 tablet 3    pregabalin (LYRICA) 50 MG capsule Take 1 capsule by mouth.      sacubitril-valsartan (Entresto) 24-26 MG tablet Take 1 tablet by mouth Every 12 (Twelve) Hours. 60 tablet 11    vitamin B-12 (CYANOCOBALAMIN) 1000 MCG tablet Take 1 tablet by mouth Daily.      DULoxetine (CYMBALTA) 30 MG capsule Take 1 capsule by mouth Daily.      insulin detemir (LEVEMIR) 100 UNIT/ML injection Inject 30 Units under the skin into the appropriate area as directed Every Night.       No current facility-administered medications for this visit.     Assessment:       Diagnosis Plan   1. Ischemic cardiomyopathy  ECG 12 Lead      2. Mixed hyperlipidemia        3. Essential hypertension        4. Coronary artery disease of native artery of native heart with stable angina pectoris        5. Shortness of breath  Adult Transthoracic Echo Complete W/ Cont if Necessary Per Protocol           Orders Placed This Encounter   Procedures    ECG 12 Lead     This order was created via procedure documentation     Release to patient:   Routine Release [[id number]]    Adult Transthoracic Echo Complete W/ Cont if Necessary Per Protocol     Standing Status:   Future     Expected Date:   9/22/2025     Expiration Date:   3/26/2026     Reason for exam?:   Dyspnea     Release to patient:   Routine Release [[id number]]         Plan:       1. 69 year old gentleman with coronary artery disease with prior coronary artery stenting.   He had an EF of 31% in 2021 at the time of non-STEMI and required drug-eluting stents to the circumflex.  He is now status post CABG x 4 in May 2024 through [facility].  He is inconsistently taking aspirin and Plavix is cheaper for him.  He will continue Plavix monotherapy with statin therapy and beta-blocker  2.  Ischemic cardiomyopathy with an EF of 32% in September 2021. EF normalized and was last normal March 2024 through [facility].  He is maintained on Entresto, carvedilol, Jardiance and Lasix.  Continue the same regimen  3.Carotid artery stenosis, moderate bilateral on duplex May 2024 at [facility].  4.  Diabetes type 2.-Not very well-controlled with A1c of 8.7 in February 2025  5.  Hyperlipidemia continue atorvastatin 80 mg. Target LDL 70 or less  6. Probable COPD-  7.  Hypertension-if anything his blood pressure appears on the low side initially today but his recheck improved.  He denies dizziness or lightheadedness or near syncope  8.  Aortic valve sclerosis-his intermittent shortness of breath with exertion.  Will plan repeat echo when he returns to reevaluate valvular structure and function and other internal cardiac structure and function  9.  History of kidney stones with multiple cystoscopies, stent and stone extraction.  Follows closely with urology.  No recent hematuria  10. Former smoker  Call question or concern otherwise follow-up in 6 months.            It has been a pleasure to participate in this patient's care.      Thank you,  ISABELLA Mclean      **I used Dragon to dictate this note:**

## 2025-04-02 ENCOUNTER — TELEPHONE (OUTPATIENT)
Dept: CARDIOLOGY | Age: 70
End: 2025-04-02

## 2025-04-02 NOTE — TELEPHONE ENCOUNTER
[facility] is requesting a Cardiac Clearance for Pt to have a Cataract extraction to be done under MAC Anesthesia to take place in a Surgical Center.         Pt aw you last on 3/26/25    Fax number is [phone]

## 2025-04-02 NOTE — TELEPHONE ENCOUNTER
This patient is at acceptable cardiovascular risk to proceed with cataract surgery under MAC anesthesia and may hold Plavix 5 days prior and resume soon as possible.      ISABELLA Mclean  [facility]   [address]  [place] [address]  Ph: [phone]  Fax: [phone]

## 2025-07-30 ENCOUNTER — APPOINTMENT (OUTPATIENT)
Dept: GENERAL RADIOLOGY | Facility: HOSPITAL | Age: 70
End: 2025-07-30
Payer: COMMERCIAL

## 2025-07-30 ENCOUNTER — HOSPITAL ENCOUNTER (OUTPATIENT)
Facility: HOSPITAL | Age: 70
Setting detail: OBSERVATION
Discharge: HOME | End: 2025-07-31
Attending: EMERGENCY MEDICINE | Admitting: EMERGENCY MEDICINE
Payer: COMMERCIAL

## 2025-07-30 DIAGNOSIS — Z86.79 HISTORY OF CAD (CORONARY ARTERY DISEASE): ICD-10-CM

## 2025-07-30 DIAGNOSIS — R07.89 CHEST DISCOMFORT: Primary | ICD-10-CM

## 2025-07-30 PROBLEM — R07.9 CHEST PAIN: Status: ACTIVE | Noted: 2025-07-30

## 2025-07-30 LAB
ALBUMIN SERPL-MCNC: 4 G/DL (ref 3.5–5.2)
ALBUMIN/GLOB SERPL: 1.3 G/DL
ALP SERPL-CCNC: 151 U/L (ref 39–117)
ALT SERPL W P-5'-P-CCNC: 30 U/L (ref 1–41)
ANION GAP SERPL CALCULATED.3IONS-SCNC: 8.6 MMOL/L (ref 5–15)
AST SERPL-CCNC: 28 U/L (ref 1–40)
BASOPHILS # BLD AUTO: 0.05 10*3/MM3 (ref 0–0.2)
BASOPHILS NFR BLD AUTO: 0.6 % (ref 0–1.5)
BILIRUB SERPL-MCNC: 0.3 MG/DL (ref 0–1.2)
BUN SERPL-MCNC: 18 MG/DL (ref 8–23)
BUN/CREAT SERPL: 21.2 (ref 7–25)
CALCIUM SPEC-SCNC: 10 MG/DL (ref 8.6–10.5)
CHLORIDE SERPL-SCNC: 107 MMOL/L (ref 98–107)
CO2 SERPL-SCNC: 25.4 MMOL/L (ref 22–29)
CREAT SERPL-MCNC: 0.85 MG/DL (ref 0.76–1.27)
DEPRECATED RDW RBC AUTO: 48.1 FL (ref 37–54)
EGFRCR SERPLBLD CKD-EPI 2021: 94.1 ML/MIN/1.73
EOSINOPHIL # BLD AUTO: 0.33 10*3/MM3 (ref 0–0.4)
EOSINOPHIL NFR BLD AUTO: 3.8 % (ref 0.3–6.2)
ERYTHROCYTE [DISTWIDTH] IN BLOOD BY AUTOMATED COUNT: 13.8 % (ref 12.3–15.4)
GEN 5 1HR TROPONIN T REFLEX: 25 NG/L
GLOBULIN UR ELPH-MCNC: 3.1 GM/DL
GLUCOSE BLDC GLUCOMTR-MCNC: 146 MG/DL (ref 65–99)
GLUCOSE BLDC GLUCOMTR-MCNC: 171 MG/DL (ref 65–99)
GLUCOSE SERPL-MCNC: 197 MG/DL (ref 65–99)
HBA1C MFR BLD: 7 % (ref 4.8–5.6)
HCT VFR BLD AUTO: 52.6 % (ref 37.5–51)
HGB BLD-MCNC: 17.2 G/DL (ref 13–17.7)
HOLD SPECIMEN: NORMAL
HOLD SPECIMEN: NORMAL
IMM GRANULOCYTES # BLD AUTO: 0.06 10*3/MM3 (ref 0–0.05)
IMM GRANULOCYTES NFR BLD AUTO: 0.7 % (ref 0–0.5)
LYMPHOCYTES # BLD AUTO: 2.03 10*3/MM3 (ref 0.7–3.1)
LYMPHOCYTES NFR BLD AUTO: 23.5 % (ref 19.6–45.3)
MCH RBC QN AUTO: 30.6 PG (ref 26.6–33)
MCHC RBC AUTO-ENTMCNC: 32.7 G/DL (ref 31.5–35.7)
MCV RBC AUTO: 93.6 FL (ref 79–97)
MONOCYTES # BLD AUTO: 0.86 10*3/MM3 (ref 0.1–0.9)
MONOCYTES NFR BLD AUTO: 10 % (ref 5–12)
NEUTROPHILS NFR BLD AUTO: 5.3 10*3/MM3 (ref 1.7–7)
NEUTROPHILS NFR BLD AUTO: 61.4 % (ref 42.7–76)
NRBC BLD AUTO-RTO: 0 /100 WBC (ref 0–0.2)
PLATELET # BLD AUTO: 141 10*3/MM3 (ref 140–450)
PMV BLD AUTO: 11.5 FL (ref 6–12)
POTASSIUM SERPL-SCNC: 4.4 MMOL/L (ref 3.5–5.2)
PROT SERPL-MCNC: 7.1 G/DL (ref 6–8.5)
QT INTERVAL: 408 MS
QTC INTERVAL: 414 MS
RBC # BLD AUTO: 5.62 10*6/MM3 (ref 4.14–5.8)
SODIUM SERPL-SCNC: 141 MMOL/L (ref 136–145)
TROPONIN T % DELTA: -4
TROPONIN T NUMERIC DELTA: -1 NG/L
TROPONIN T SERPL HS-MCNC: 26 NG/L
WBC NRBC COR # BLD AUTO: 8.63 10*3/MM3 (ref 3.4–10.8)
WHOLE BLOOD HOLD COAG: NORMAL
WHOLE BLOOD HOLD SPECIMEN: NORMAL

## 2025-07-30 PROCEDURE — 83036 HEMOGLOBIN GLYCOSYLATED A1C: CPT | Performed by: NURSE PRACTITIONER

## 2025-07-30 PROCEDURE — 36415 COLL VENOUS BLD VENIPUNCTURE: CPT | Performed by: EMERGENCY MEDICINE

## 2025-07-30 PROCEDURE — 80053 COMPREHEN METABOLIC PANEL: CPT | Performed by: EMERGENCY MEDICINE

## 2025-07-30 PROCEDURE — 99285 EMERGENCY DEPT VISIT HI MDM: CPT

## 2025-07-30 PROCEDURE — G0378 HOSPITAL OBSERVATION PER HR: HCPCS

## 2025-07-30 PROCEDURE — 93010 ELECTROCARDIOGRAM REPORT: CPT | Performed by: INTERNAL MEDICINE

## 2025-07-30 PROCEDURE — 63710000001 INSULIN GLARGINE PER 5 UNITS: Performed by: NURSE PRACTITIONER

## 2025-07-30 PROCEDURE — 71045 X-RAY EXAM CHEST 1 VIEW: CPT

## 2025-07-30 PROCEDURE — 93005 ELECTROCARDIOGRAM TRACING: CPT

## 2025-07-30 PROCEDURE — 93005 ELECTROCARDIOGRAM TRACING: CPT | Performed by: EMERGENCY MEDICINE

## 2025-07-30 PROCEDURE — 84484 ASSAY OF TROPONIN QUANT: CPT | Performed by: EMERGENCY MEDICINE

## 2025-07-30 PROCEDURE — 85025 COMPLETE CBC W/AUTO DIFF WBC: CPT | Performed by: EMERGENCY MEDICINE

## 2025-07-30 PROCEDURE — 63710000001 INSULIN LISPRO (HUMAN) PER 5 UNITS: Performed by: NURSE PRACTITIONER

## 2025-07-30 PROCEDURE — 82948 REAGENT STRIP/BLOOD GLUCOSE: CPT

## 2025-07-30 PROCEDURE — 99214 OFFICE O/P EST MOD 30 MIN: CPT | Performed by: INTERNAL MEDICINE

## 2025-07-30 RX ORDER — CARVEDILOL 25 MG/1
25 TABLET ORAL EVERY 12 HOURS
Status: DISCONTINUED | OUTPATIENT
Start: 2025-07-30 | End: 2025-07-31 | Stop reason: HOSPADM

## 2025-07-30 RX ORDER — SODIUM CHLORIDE 0.9 % (FLUSH) 0.9 %
10 SYRINGE (ML) INJECTION AS NEEDED
Status: DISCONTINUED | OUTPATIENT
Start: 2025-07-30 | End: 2025-07-31 | Stop reason: HOSPADM

## 2025-07-30 RX ORDER — POTASSIUM CHLORIDE 750 MG/1
10 TABLET, EXTENDED RELEASE ORAL 2 TIMES DAILY WITH MEALS
Status: DISCONTINUED | OUTPATIENT
Start: 2025-07-30 | End: 2025-07-31 | Stop reason: HOSPADM

## 2025-07-30 RX ORDER — PANTOPRAZOLE SODIUM 40 MG/1
40 TABLET, DELAYED RELEASE ORAL
Status: DISCONTINUED | OUTPATIENT
Start: 2025-07-31 | End: 2025-07-31 | Stop reason: HOSPADM

## 2025-07-30 RX ORDER — NICOTINE POLACRILEX 4 MG
15 LOZENGE BUCCAL
Status: DISCONTINUED | OUTPATIENT
Start: 2025-07-30 | End: 2025-07-31 | Stop reason: HOSPADM

## 2025-07-30 RX ORDER — CLOPIDOGREL BISULFATE 75 MG/1
75 TABLET ORAL DAILY
Status: DISCONTINUED | OUTPATIENT
Start: 2025-07-30 | End: 2025-07-31 | Stop reason: HOSPADM

## 2025-07-30 RX ORDER — SODIUM CHLORIDE 9 MG/ML
40 INJECTION, SOLUTION INTRAVENOUS AS NEEDED
Status: DISCONTINUED | OUTPATIENT
Start: 2025-07-30 | End: 2025-07-31 | Stop reason: HOSPADM

## 2025-07-30 RX ORDER — DULOXETIN HYDROCHLORIDE 30 MG/1
30 CAPSULE, DELAYED RELEASE ORAL DAILY
Status: DISCONTINUED | OUTPATIENT
Start: 2025-07-30 | End: 2025-07-31 | Stop reason: HOSPADM

## 2025-07-30 RX ORDER — DEXTROSE MONOHYDRATE 25 G/50ML
25 INJECTION, SOLUTION INTRAVENOUS
Status: DISCONTINUED | OUTPATIENT
Start: 2025-07-30 | End: 2025-07-31 | Stop reason: HOSPADM

## 2025-07-30 RX ORDER — PREGABALIN 50 MG/1
50 CAPSULE ORAL EVERY 8 HOURS SCHEDULED
Status: DISCONTINUED | OUTPATIENT
Start: 2025-07-30 | End: 2025-07-31 | Stop reason: HOSPADM

## 2025-07-30 RX ORDER — ALBUTEROL SULFATE 0.83 MG/ML
2.5 SOLUTION RESPIRATORY (INHALATION) EVERY 6 HOURS PRN
Status: DISCONTINUED | OUTPATIENT
Start: 2025-07-30 | End: 2025-07-31 | Stop reason: HOSPADM

## 2025-07-30 RX ORDER — IBUPROFEN 600 MG/1
1 TABLET ORAL
Status: DISCONTINUED | OUTPATIENT
Start: 2025-07-30 | End: 2025-07-31 | Stop reason: HOSPADM

## 2025-07-30 RX ORDER — TRAZODONE HYDROCHLORIDE 50 MG/1
50 TABLET ORAL NIGHTLY
COMMUNITY

## 2025-07-30 RX ORDER — ALBUTEROL SULFATE 90 UG/1
2 INHALANT RESPIRATORY (INHALATION) EVERY 4 HOURS PRN
COMMUNITY

## 2025-07-30 RX ORDER — ASPIRIN 325 MG
325 TABLET ORAL ONCE
Status: COMPLETED | OUTPATIENT
Start: 2025-07-30 | End: 2025-07-30

## 2025-07-30 RX ORDER — ASPIRIN 81 MG/1
81 TABLET ORAL DAILY
Status: DISCONTINUED | OUTPATIENT
Start: 2025-07-31 | End: 2025-07-31 | Stop reason: HOSPADM

## 2025-07-30 RX ORDER — FUROSEMIDE 20 MG/1
20 TABLET ORAL DAILY
Status: DISCONTINUED | OUTPATIENT
Start: 2025-07-30 | End: 2025-07-31 | Stop reason: HOSPADM

## 2025-07-30 RX ORDER — SODIUM CHLORIDE 0.9 % (FLUSH) 0.9 %
10 SYRINGE (ML) INJECTION EVERY 12 HOURS SCHEDULED
Status: DISCONTINUED | OUTPATIENT
Start: 2025-07-30 | End: 2025-07-31 | Stop reason: HOSPADM

## 2025-07-30 RX ORDER — ATORVASTATIN CALCIUM 80 MG/1
80 TABLET, FILM COATED ORAL DAILY
Status: DISCONTINUED | OUTPATIENT
Start: 2025-07-30 | End: 2025-07-31 | Stop reason: HOSPADM

## 2025-07-30 RX ORDER — INSULIN LISPRO 100 [IU]/ML
2-7 INJECTION, SOLUTION INTRAVENOUS; SUBCUTANEOUS
Status: DISCONTINUED | OUTPATIENT
Start: 2025-07-30 | End: 2025-07-31 | Stop reason: HOSPADM

## 2025-07-30 RX ORDER — NITROGLYCERIN 0.4 MG/1
0.4 TABLET SUBLINGUAL
Status: DISCONTINUED | OUTPATIENT
Start: 2025-07-30 | End: 2025-07-31 | Stop reason: HOSPADM

## 2025-07-30 RX ORDER — ASPIRIN 81 MG/1
324 TABLET, CHEWABLE ORAL ONCE
Status: DISCONTINUED | OUTPATIENT
Start: 2025-07-30 | End: 2025-07-31 | Stop reason: HOSPADM

## 2025-07-30 RX ORDER — SACUBITRIL AND VALSARTAN 24; 26 MG/1; MG/1
1 TABLET, FILM COATED ORAL EVERY 12 HOURS SCHEDULED
Status: DISCONTINUED | OUTPATIENT
Start: 2025-07-30 | End: 2025-07-31 | Stop reason: HOSPADM

## 2025-07-30 RX ORDER — TRAZODONE HYDROCHLORIDE 50 MG/1
50 TABLET ORAL NIGHTLY
Status: DISCONTINUED | OUTPATIENT
Start: 2025-07-30 | End: 2025-07-31 | Stop reason: HOSPADM

## 2025-07-30 RX ADMIN — PREGABALIN 50 MG: 50 CAPSULE ORAL at 21:45

## 2025-07-30 RX ADMIN — CLOPIDOGREL BISULFATE 75 MG: 75 TABLET, FILM COATED ORAL at 17:35

## 2025-07-30 RX ADMIN — DULOXETINE 30 MG: 30 CAPSULE, DELAYED RELEASE ORAL at 17:35

## 2025-07-30 RX ADMIN — Medication 10 ML: at 14:25

## 2025-07-30 RX ADMIN — NITROGLYCERIN 0.4 MG: 0.4 TABLET SUBLINGUAL at 12:03

## 2025-07-30 RX ADMIN — ATORVASTATIN CALCIUM 80 MG: 80 TABLET, FILM COATED ORAL at 17:35

## 2025-07-30 RX ADMIN — Medication 10 ML: at 21:49

## 2025-07-30 RX ADMIN — POTASSIUM CHLORIDE 10 MEQ: 750 TABLET, EXTENDED RELEASE ORAL at 17:35

## 2025-07-30 RX ADMIN — SACUBITRIL AND VALSARTAN 1 TABLET: 24; 26 TABLET, FILM COATED ORAL at 21:45

## 2025-07-30 RX ADMIN — INSULIN GLARGINE 24 UNITS: 100 INJECTION, SOLUTION SUBCUTANEOUS at 21:45

## 2025-07-30 RX ADMIN — ASPIRIN 325 MG ORAL TABLET 325 MG: 325 PILL ORAL at 12:03

## 2025-07-30 RX ADMIN — TRAZODONE HYDROCHLORIDE 50 MG: 50 TABLET ORAL at 21:45

## 2025-07-30 RX ADMIN — EMPAGLIFLOZIN 10 MG: 10 TABLET, FILM COATED ORAL at 17:35

## 2025-07-30 RX ADMIN — FUROSEMIDE 20 MG: 20 TABLET ORAL at 17:35

## 2025-07-30 RX ADMIN — INSULIN LISPRO 2 UNITS: 100 INJECTION, SOLUTION INTRAVENOUS; SUBCUTANEOUS at 21:45

## 2025-07-30 NOTE — ED PROVIDER NOTES
EMERGENCY DEPARTMENT ENCOUNTER  Room Number:  HE2/J  PCP: Yelena Jacobson APRN  Independent Historians: Patient      HPI:  Chief Complaint: had concerns including Chest Pain.     A complete HPI/ROS/PMH/PSH/SH/FH are unobtainable due to: EM_Unobtainable History : None    Chronic or social conditions impacting patient care (Social Determinants of Health): None      Context: Matt Cruz is a 69 y.o. male with a medical history of hypertension, hyperlipidemia, diabetes, aortic valve sclerosis, CAD, obesity and ischemic cardiomyopathy who presents to the ED c/o acute right-sided nonradiating chest pressure that began about an hour and 1/2 to 2 hours ago.  It has eased down but is not resolved.  It was associated with some nausea and dyspnea initially though those symptoms have resolved.  Began at rest with no clear exacerbating relieving factors.      Review of prior external notes (non-ED) -and- Review of prior external test results outside of this encounter:  Allergy office note 3/26/2025 reviewed: Patient followed for history of hypertension, hyperlipidemia, aortic valve sclerosis and coronary artery disease status post CABG May 2024, and ischemic cardiomyopathy      PAST MEDICAL HISTORY  Active Ambulatory Problems     Diagnosis Date Noted    Essential hypertension 04/13/2018    Type 2 diabetes mellitus with circulatory disorder, with long-term current use of insulin 04/13/2018    Mixed hyperlipidemia 04/13/2018    Carotid stenosis 04/15/2018    Coronary artery disease of native artery of native heart with stable angina pectoris 04/17/2018    Ischemic cardiomyopathy 03/04/2022    NSTEMI (non-ST elevated myocardial infarction) 10/27/2022     Resolved Ambulatory Problems     Diagnosis Date Noted    Exertional chest pain 04/13/2018    TIA (transient ischemic attack) 04/13/2018    Chest pain with high risk of acute coronary syndrome 09/08/2021     Past Medical History:   Diagnosis Date    CAD (coronary artery  disease)     Cataracts, bilateral     Coronary artery disease     Elevated cholesterol     Hyperlipidemia     Hypertension     Kidney stone     Pneumonia 2025    Type 2 diabetes mellitus with other specified complication          PAST SURGICAL HISTORY  Past Surgical History:   Procedure Laterality Date    CARDIAC CATHETERIZATION Left 04/16/2018    Procedure: Cardiac Catheterization/Vascular Study;  Surgeon: Zane De La O MD;  Location: [facility] CATH INVASIVE LOCATION;  Service: Cardiovascular    CARDIAC CATHETERIZATION N/A 04/16/2018    Procedure: Stent KD coronary;  Surgeon: Zane De La O MD;  Location: [facility] CATH INVASIVE LOCATION;  Service: Cardiovascular    CARDIAC CATHETERIZATION N/A 09/09/2021    Procedure: Left Heart Cath;  Surgeon: Zane De La O MD;  Location:  [facility] CATH INVASIVE LOCATION;  Service: Cardiology;  Laterality: N/A;    CARDIAC CATHETERIZATION N/A 09/09/2021    Procedure: Coronary angiography;  Surgeon: Zane De La O MD;  Location: [facility] CATH INVASIVE LOCATION;  Service: Cardiology;  Laterality: N/A;    CARDIAC CATHETERIZATION N/A 09/09/2021    Procedure: Left ventriculography;  Surgeon: Zane De La O MD;  Location: [facility] CATH INVASIVE LOCATION;  Service: Cardiology;  Laterality: N/A;    CARDIAC CATHETERIZATION N/A 09/09/2021    Procedure: Stent KD coronary;  Surgeon: Zane De La O MD;  Location: [facility] CATH INVASIVE LOCATION;  Service: Cardiology;  Laterality: N/A;    CARDIAC CATHETERIZATION N/A 09/09/2021    Procedure: Percutaneous Coronary Intervention;  Surgeon: Zane De La O MD;  Location: [facility] CATH INVASIVE LOCATION;  Service: Cardiology;  Laterality: N/A;    CARDIAC CATHETERIZATION N/A 10/27/2022    Procedure: Left Heart Cath;  Surgeon: Stalin Green MD;  Location: [facility] CATH INVASIVE LOCATION;  Service: Cardiology;  Laterality: N/A;    CARDIAC CATHETERIZATION N/A 10/27/2022    Procedure: Coronary angiography;  Surgeon: Stalin Green MD;   Location:  [facility] CATH INVASIVE LOCATION;  Service: Cardiology;  Laterality: N/A;    CARDIAC CATHETERIZATION N/A 10/27/2022    Procedure: Percutaneous Coronary Intervention;  Surgeon: Stalin Green MD;  Location: [facility] CATH INVASIVE LOCATION;  Service: Cardiology;  Laterality: N/A;    CORONARY ANGIOPLASTY WITH STENT PLACEMENT      CORONARY ARTERY BYPASS GRAFT      KIDNEY STONE SURGERY           FAMILY HISTORY  Family History   Problem Relation Age of Onset    Coronary artery disease Father 60    Heart attack Brother 48                 SOCIAL HISTORY  Social History     Socioeconomic History    Marital status:    Tobacco Use    Smoking status: Former     Types: Cigars, Pipe     Quit date:      Years since quittin.5     Passive exposure: Past (father and brother smoked in the home)    Smokeless tobacco: Never    Tobacco comments:     caffeine- dr efren toth 1-2 daily    Vaping Use    Vaping status: Never Used   Substance and Sexual Activity    Alcohol use: Yes     Alcohol/week: 2.0 standard drinks of alcohol     Types: 2 Cans of beer per week     Comment: beer on occas    Drug use: No    Sexual activity: Defer         ALLERGIES  Patient has no known allergies.      REVIEW OF SYSTEMS  Review of Systems  Included in HPI  All systems reviewed and negative except for those discussed in HPI.      PHYSICAL EXAM    I have reviewed the triage vital signs and nursing notes.    ED Triage Vitals   Temp Heart Rate Resp BP SpO2   25 1058 25 1058 25 1058 25 1105 25 1058   97.7 °F (36.5 °C) 78 18 135/61 94 %      Temp src Heart Rate Source Patient Position BP Location FiO2 (%)   25 1058 25 1122 25 1105 25 1105 --   Oral Monitor Lying Left arm        Physical Exam    Physical Exam   Constitutional: No distress.  Nontoxic  HENT:  Head: Normocephalic and atraumatic.   Oropharynx: Mucous membranes are moist.   Eyes: . No scleral icterus. No conjunctival  pallor.  Neck: Normal range of motion. Neck supple.   Cardiovascular: Pink warm and well perfused throughout.  Regular  Pulmonary/Chest: No respiratory distress.  No tachypnea or increased work of breathing appreciated.  Clear to auscultation.  Speaks in full sentences without difficulty  Abdominal: Soft. There is no tenderness. There is no rebound and no guarding.   Musculoskeletal: Moves all extremities equally.  Chronic appearing bilateral edema  Neurological: Alert and oriented.  No acute focal deficit appreciated.  Skin: Skin is pink, warm, and dry.   Psychiatric: Mood and affect normal.   Nursing note and vitals reviewed.             LAB RESULTS  Recent Results (from the past 24 hours)   ECG 12 Lead ED Triage Standing Order; Chest Pain    Collection Time: 07/30/25 11:03 AM   Result Value Ref Range    QT Interval 408 ms    QTC Interval 414 ms   Comprehensive Metabolic Panel    Collection Time: 07/30/25 11:04 AM    Specimen: Arm, Right; Blood   Result Value Ref Range    Glucose 197 (H) 65 - 99 mg/dL    BUN 18.0 8.0 - 23.0 mg/dL    Creatinine 0.85 0.76 - 1.27 mg/dL    Sodium 141 136 - 145 mmol/L    Potassium 4.4 3.5 - 5.2 mmol/L    Chloride 107 98 - 107 mmol/L    CO2 25.4 22.0 - 29.0 mmol/L    Calcium 10.0 8.6 - 10.5 mg/dL    Total Protein 7.1 6.0 - 8.5 g/dL    Albumin 4.0 3.5 - 5.2 g/dL    ALT (SGPT) 30 1 - 41 U/L    AST (SGOT) 28 1 - 40 U/L    Alkaline Phosphatase 151 (H) 39 - 117 U/L    Total Bilirubin 0.3 0.0 - 1.2 mg/dL    Globulin 3.1 gm/dL    A/G Ratio 1.3 g/dL    BUN/Creatinine Ratio 21.2 7.0 - 25.0    Anion Gap 8.6 5.0 - 15.0 mmol/L    eGFR 94.1 >60.0 mL/min/1.73   High Sensitivity Troponin T    Collection Time: 07/30/25 11:04 AM    Specimen: Arm, Right; Blood   Result Value Ref Range    HS Troponin T 26 (H) <22 ng/L   Green Top (Gel)    Collection Time: 07/30/25 11:04 AM   Result Value Ref Range    Extra Tube Hold for add-ons.    Lavender Top    Collection Time: 07/30/25 11:04 AM   Result Value Ref  Range    Extra Tube hold for add-on    Gold Top - SST    Collection Time: 07/30/25 11:04 AM   Result Value Ref Range    Extra Tube Hold for add-ons.    Light Blue Top    Collection Time: 07/30/25 11:04 AM   Result Value Ref Range    Extra Tube Hold for add-ons.    CBC Auto Differential    Collection Time: 07/30/25 11:04 AM    Specimen: Arm, Right; Blood   Result Value Ref Range    WBC 8.63 3.40 - 10.80 10*3/mm3    RBC 5.62 4.14 - 5.80 10*6/mm3    Hemoglobin 17.2 13.0 - 17.7 g/dL    Hematocrit 52.6 (H) 37.5 - 51.0 %    MCV 93.6 79.0 - 97.0 fL    MCH 30.6 26.6 - 33.0 pg    MCHC 32.7 31.5 - 35.7 g/dL    RDW 13.8 12.3 - 15.4 %    RDW-SD 48.1 37.0 - 54.0 fl    MPV 11.5 6.0 - 12.0 fL    Platelets 141 140 - 450 10*3/mm3    Neutrophil % 61.4 42.7 - 76.0 %    Lymphocyte % 23.5 19.6 - 45.3 %    Monocyte % 10.0 5.0 - 12.0 %    Eosinophil % 3.8 0.3 - 6.2 %    Basophil % 0.6 0.0 - 1.5 %    Immature Grans % 0.7 (H) 0.0 - 0.5 %    Neutrophils, Absolute 5.30 1.70 - 7.00 10*3/mm3    Lymphocytes, Absolute 2.03 0.70 - 3.10 10*3/mm3    Monocytes, Absolute 0.86 0.10 - 0.90 10*3/mm3    Eosinophils, Absolute 0.33 0.00 - 0.40 10*3/mm3    Basophils, Absolute 0.05 0.00 - 0.20 10*3/mm3    Immature Grans, Absolute 0.06 (H) 0.00 - 0.05 10*3/mm3    nRBC 0.0 0.0 - 0.2 /100 WBC   High Sensitivity Troponin T 1Hr    Collection Time: 07/30/25 12:08 PM    Specimen: Hand, Left; Blood   Result Value Ref Range    HS Troponin T 25 (H) <22 ng/L    Troponin T Numeric Delta -1 ng/L    Troponin T % Delta -4 Abnormal if >/= 20%         RADIOLOGY  XR Chest 1 View  Result Date: 7/30/2025  XR CHEST 1 VW-  Clinical: Chest pain  COMPARISON examination 10/26/2022  FINDINGS: The cardiomediastinal silhouette is stable and the right lung is clear. Similar to the previous examination there is elevation of the left hemidiaphragm. Chronic or recurrent left base airspace disease similar to the previous examination. There could be a small amount of associated pleural fluid  or thickening. No vascular congestion. The remainder is unremarkable.  This report was finalized on 7/30/2025 11:24 AM by Dr. Mike Dodd M.D on Workstation: [id number]          MEDICATIONS GIVEN IN ER  Medications   sodium chloride 0.9 % flush 10 mL (has no administration in time range)   nitroglycerin (NITROSTAT) SL tablet 0.4 mg (0.4 mg Sublingual Given 7/30/25 1203)   aspirin tablet 325 mg (325 mg Oral Given 7/30/25 1203)         ORDERS PLACED DURING THIS VISIT:  Orders Placed This Encounter   Procedures    XR Chest 1 View    [place] Draw    Comprehensive Metabolic Panel    High Sensitivity Troponin T    CBC Auto Differential    High Sensitivity Troponin T 1Hr    NPO Diet NPO Type: Strict NPO    Undress & Gown    Continuous Pulse Oximetry    Yes To aspirin  Misc Nursing Order (Specify)    LCG (on-call MD unless specified)    Oxygen Therapy- Nasal Cannula; Titrate 1-6 LPM Per SpO2; 90 - 95%    ECG 12 Lead ED Triage Standing Order; Chest Pain    ECG 12 Lead ED Triage Standing Order; Chest Pain    Insert Peripheral IV    Initiate Emergency Department Observation Status    CBC & Differential    Green Top (Gel)    Lavender Top    Gold Top - SST    Light Blue Top         OUTPATIENT MEDICATION MANAGEMENT:  Current Facility-Administered Medications Ordered in Epic   Medication Dose Route Frequency Provider Last Rate Last Admin    nitroglycerin (NITROSTAT) SL tablet 0.4 mg  0.4 mg Sublingual Q5 Min PRN Hill Downey MD   0.4 mg at 07/30/25 1203    sodium chloride 0.9 % flush 10 mL  10 mL Intravenous PRN Hill Downey MD         Current Outpatient Medications Ordered in Epic   Medication Sig Dispense Refill    atorvastatin (LIPITOR) 80 MG tablet TAKE ONE TABLET BY MOUTH ONCE NIGHTLY 90 tablet 2    Blood Glucose Monitoring Suppl (OneTouch Verio) w/Device kit Use to test blood glucose four times daily 1 kit 0    carvedilol (COREG) 25 MG tablet Take 1 tablet by mouth Every 12 (Twelve) Hours. 180 tablet 2     Cholecalciferol 50 MCG (2000 UT) capsule Take 1 capsule by mouth Daily.      clopidogrel (PLAVIX) 75 MG tablet TAKE ONE TABLET BY MOUTH DAILY 90 tablet 3    DULoxetine (CYMBALTA) 30 MG capsule Take 1 capsule by mouth Daily.      empagliflozin (JARDIANCE) 10 MG tablet tablet Take 1 tablet by mouth Daily.      finasteride (PROSCAR) 5 MG tablet Take 1 tablet by mouth Daily.      furosemide (LASIX) 20 MG tablet TAKE 1 TABLET BY MOUTH DAILY 90 tablet 3    glucose blood (OneTouch Verio) test strip Use to test blood glucose four times daily 100 each 0    Lancets (OneTouch Delica Plus [id number]) misc Use to test blood glucose four times daily 100 each 0    Lantus SoloStar 100 UNIT/ML injection pen Inject 30 Units under the skin into the appropriate area as directed Daily.      metFORMIN (GLUCOPHAGE) 1000 MG tablet Take 1 tablet by mouth.      nitroglycerin (NITROSTAT) 0.4 MG SL tablet DISSOLVE 1 TAB UNDER TONGUE FOR CHEST PAIN - IF PAIN REMAINS AFTER 5 MIN, CALL 911 AND REPEAT DOSE. MAX 3 TABS IN 15 MINUTES 25 tablet 1    pantoprazole (PROTONIX) 20 MG EC tablet Take 1 tablet by mouth Daily.      potassium chloride 10 MEQ CR tablet TAKE 1 TABLET BY MOUTH TWICE A DAY WITH MEALS 180 tablet 3    pregabalin (LYRICA) 50 MG capsule Take 1 capsule by mouth.      sacubitril-valsartan (Entresto) 24-26 MG tablet Take 1 tablet by mouth Every 12 (Twelve) Hours. 60 tablet 11    vitamin B-12 (CYANOCOBALAMIN) 1000 MCG tablet Take 1 tablet by mouth Daily.           PROCEDURES  Procedures            PROGRESS, DATA ANALYSIS, CONSULTS, AND MEDICAL DECISION MAKING  All labs have been independently interpreted by me.  All radiology studies have been reviewed by me. All EKGs have been independently viewed and interpreted by me.  Discussion below represents my analysis of pertinent findings related to patient's condition, differential diagnosis, treatment plan and final disposition.    Differential diagnosis:   My differential diagnosis for chest  pain includes but is not limited to:  Muscle strain, costochondritis, myositis, pleurisy, rib fracture, intercostal neuritis, herpes zoster, tumor, myocardial infarction, coronary syndrome, unstable angina, angina, aortic dissection, mitral valve prolapse, pericarditis, palpitations, pulmonary embolus, pneumonia, pneumothorax, lung cancer, GERD, esophagitis, esophageal spasm      Clinical Scores: HEART Score: 7                  ED Course as of 07/30/25 1316   Wed Jul 30, 2025   1131 WBC: 8.63 [RS]   1131 Hemoglobin: 17.2 [RS]   1132 RADIOLOGY      Study: Single view chest  Findings: Evidence of prior sternotomy.  No pneumothorax or large focal infiltrate noted  I independently viewed and interpreted these images contemporaneously with treatment.    [RS]   1132 EKG           EKG time: 1104  Rhythm/Rate: Sinus, 60-65  P waves and TN: Borderline first-degree AV block  QRS, axis: Narrow complex with slow R wave progression  ST and T waves: No STEMI; inferolateral T wave flattening    Interpreted Contemporaneously by me, independently viewed  Comparison: 3/26/2025-similar appearing [RS]   1151 HS Troponin T(!): 26  Plan delta troponin [RS]   1151 Glucose(!): 197 [RS]   1151 BUN: 18.0 [RS]   1151 Creatinine: 0.85 [RS]   1151 Sodium: 141 [RS]   1151 Potassium: 4.4 [RS]   1152 Aspirin and nitroglycerin.  Monitor for change in symptoms. [RS]   1303 HS Troponin T(!): 25 [RS]   1315 Reviewed all findings with patient.  He feels much better after nitroglycerin.  Discussed the possibility of admission and this would be the patient's preference. [RS]   1315 Cardiology consult placed.   [RS]   1316 CONSULT        Provider: BLANCA ALVARADO    Discussion: Reviewed patient history, ED presentation and evaluation as well as response to therapies and pending cardiology consult.  Agreeable to accept patient for further evaluation and treatment.    Agreeable c treatment and planned disposition.         [RS]      ED Course User  Index  [RS] Hill Downey MD         Prescription drug monitoring program review:     AS OF 13:16 EDT VITALS:    BP - 120/59  HR - 55  TEMP - 97.7 °F (36.5 °C) (Oral)  O2 SATS - 94%    COMPLEXITY OF CARE  The patient requires admission.      DIAGNOSIS  Final diagnoses:   Chest discomfort   History of CAD (coronary artery disease)         DISPOSITION  ED Disposition       ED Disposition   Decision to Admit    Condition   --    Comment   --                  ADMISSION    Discussed treatment plan and reason for admission with pt/family and admitting physician.  Pt/family voiced understanding of the plan for admission for further testing/treatment as needed.       Please note that portions of this document were completed with a voice recognition program.    Note Disclaimer: At [facility], we believe that sharing information builds trust and better relationships. You are receiving this note because you recently visited [facility]. It is possible you will see health information before a provider has talked with you about it. This kind of information can be easy to misunderstand. To help you fully understand what it means for your health, we urge you to discuss this note with your provider.         Hill Downey MD  07/30/25 [id number]

## 2025-07-30 NOTE — H&P
[facility]   HISTORY AND PHYSICAL    Patient Name: Matt Cruz  : 1955  MRN: [id number]  Primary Care Physician:  Yelena Jacobson APRN  Date of admission: 2025    Subjective   Subjective     Chief Complaint:   Chief Complaint   Patient presents with    Chest Pain     HPI:    Matt Cruz is a 69 y.o. male with a history of CAD with prior CABG/PCI, ischemic cardiomyopathy carotid stenosis, hypertension, hyperlipidemia, TIA, type 2 diabetes who presented to the emergency department with complaints of acute right-sided nonradiating chest pain.  Pain occurred this morning while he was at rest.  Symptoms were improved after nitroglycerin in the emergency department.  Denies pain currently.  Reports associated nausea and shortness of breath.    High-sensitivity troponin was 26, 25 with a delta of -1.  Glucose was 197, alk phos 151, otherwise CBC and CMP largely unremarkable.  Chest x-ray shows sinus rhythm, no acute ischemia.  Chest x-ray negative for acute findings.    He will be admitted to the ED observation for further management.  Will obtain echocardiogram and consult cardiology.    Review of Systems   All systems were reviewed and negative except for: Those mentioned in HPI    Personal History     Past Medical History:   Diagnosis Date    CAD (coronary artery disease)     Carotid stenosis     Cataracts, bilateral     Coronary artery disease     Elevated cholesterol     Essential hypertension     Exertional chest pain     Hyperlipidemia     Hypertension     Kidney stone     Pneumonia     TIA (transient ischemic attack)     Type 2 diabetes mellitus with other specified complication        Past Surgical History:   Procedure Laterality Date    CARDIAC CATHETERIZATION Left 2018    Procedure: Cardiac Catheterization/Vascular Study;  Surgeon: Zane De La O MD;  Location: [facility] INVASIVE LOCATION;  Service: Cardiovascular    CARDIAC CATHETERIZATION N/A 2018     Procedure: Stent KD coronary;  Surgeon: Zane De La O MD;  Location:  [facility] CATH INVASIVE LOCATION;  Service: Cardiovascular    CARDIAC CATHETERIZATION N/A 09/09/2021    Procedure: Left Heart Cath;  Surgeon: Zane De La O MD;  Location:  [facility] CATH INVASIVE LOCATION;  Service: Cardiology;  Laterality: N/A;    CARDIAC CATHETERIZATION N/A 09/09/2021    Procedure: Coronary angiography;  Surgeon: Zane De La O MD;  Location:  [facility] CATH INVASIVE LOCATION;  Service: Cardiology;  Laterality: N/A;    CARDIAC CATHETERIZATION N/A 09/09/2021    Procedure: Left ventriculography;  Surgeon: Zane De La O MD;  Location:  [facility] CATH INVASIVE LOCATION;  Service: Cardiology;  Laterality: N/A;    CARDIAC CATHETERIZATION N/A 09/09/2021    Procedure: Stent KD coronary;  Surgeon: Zane De La O MD;  [facility];  Service: Cardiology;  Laterality: N/A;    CARDIAC CATHETERIZATION N/A 09/09/2021    Procedure: Percutaneous Coronary Intervention;  Surgeon: Zane De La O MD;  [facility];  Service: Cardiology;  Laterality: N/A;    CARDIAC CATHETERIZATION N/A 10/27/2022    Procedure: Left Heart Cath;  Surgeon: Stalin Green MD;  [facility];  Service: Cardiology;  Laterality: N/A;    CARDIAC CATHETERIZATION N/A 10/27/2022    Procedure: Coronary angiography;  Surgeon: Stalin Green MD;  [facility];  Service: Cardiology;  Laterality: N/A;    CARDIAC CATHETERIZATION N/A 10/27/2022    Procedure: Percutaneous Coronary Intervention;  Surgeon: Stalin Green MD;  [facility];  Service: Cardiology;  Laterality: N/A;    CORONARY ANGIOPLASTY WITH STENT PLACEMENT      CORONARY ARTERY BYPASS GRAFT      KIDNEY STONE SURGERY         Family History: family history includes Coronary artery disease (age of onset: 60) in his father; Heart attack (age of onset: 48) in his brother.  Otherwise pertinent FHx was reviewed and not pertinent to current issue.    Social History:  reports that he quit smoking about 26 years ago. His smoking use included cigars and pipe. He has been exposed to tobacco smoke. He has never used smokeless tobacco. He reports current alcohol use of about 2.0 standard drinks of alcohol per week. He reports that he does not use drugs.    Home Medications:  Cholecalciferol, DULoxetine, Insulin Glargine, OneTouch Delica Plus [id number], OneTouch Verio Flex System, atorvastatin, carvedilol, clopidogrel, empagliflozin, finasteride, furosemide, glucose blood, metFORMIN, nitroglycerin, pantoprazole, potassium chloride, pregabalin, sacubitril-valsartan, and vitamin B-12    Allergies:  No Known Allergies    Objective   Objective     Vitals:   Temp:  [97.7 °F (36.5 °C)] 97.7 °F (36.5 °C)  Heart Rate:  [55-78] 55  Resp:  [16-18] 16  BP: ()/(46-62) 120/59  Physical Exam    Constitutional: Awake, alert   Eyes: PERRLA, sclerae anicteric, no conjunctival injection   HENT: NCAT, mucous membranes moist   Neck: Supple, no thyromegaly, no lymphadenopathy, trachea midline   Respiratory: Clear to auscultation bilaterally, nonlabored respirations    Cardiovascular: RRR, no murmurs, rubs, or gallops, palpable pedal pulses bilaterally   Gastrointestinal: Positive bowel sounds, soft, nontender, nondistended   Musculoskeletal: No bilateral ankle edema, no clubbing or cyanosis to extremities   Psychiatric: Appropriate affect, cooperative   Neurologic: Oriented x 3, strength symmetric in all extremities, Cranial Nerves grossly intact to confrontation, speech clear   Skin: No rashes     Result Review    Result Review:  I have personally reviewed the results from the time of this admission to 7/30/2025 14:16 EDT and agree with these findings:  [x]  Laboratory list / accordion  []  Microbiology  [x]  Radiology  [x]  EKG/Telemetry   []  Cardiology/Vascular   []  Pathology  []  Old records  []   Other:  Most notable findings include: High-sensitivity troponin 26, 25, glucose 197, alk phos 151      Assessment & Plan   The ASCVD Risk score (Deny DK, et al., 2019) failed to calculate for the following reasons:    Risk score cannot be calculated because patient has a medical history suggesting prior/existing ASCVD    Assessment / Plan     Brief Patient Summary:  Matt Cruz is a 69 y.o. male who will be admitted to the ED observation for further management due to chest pain.  High-sensitivity troponin was mildly elevated but flat.  EKG was negative for acute ischemic changes.  Will obtain an echocardiogram and consult cardiology.    Active Hospital Problems:  Active Hospital Problems    Diagnosis     **Chest pain      Plan:     Chest pain  History of CAD with prior CABG and PCI  Continuous telemetry monitoring  Vital signs per nurse routine  Troponin 26, 25, repeat in the morning  EKG sinus rhythm, no acute ischemia  Chest x-ray negative for acute findings  Consult cardiology  Echocardiogram  Hold beta-blocker for now  Continue statin, Plavix  Lipid panel  N.p.o. after midnight    Cardiomyopathy  Continue Entresto, furosemide    Diabetes  Continue long-acting insulin  Correction scale while inpatient  A1c    GERD  Continue PPI    VTE Prophylaxis:  Mechanical VTE prophylaxis orders are present.    CODE STATUS:    Code Status (Patient has no pulse and is not breathing): CPR (Attempt to Resuscitate)  Medical Interventions (Patient has pulse or is breathing): Full Support  Level Of Support Discussed With: Patient    Admission Status:  I believe this patient meets observation status.    Electronically signed by ISABELLA Hendricks, 07/30/25, 2:16 PM EDT.    75 minutes has been spent by [facility] providers in the care of this patient while under observation status on this date 07/30/25    I have worn appropriate PPE during this patient encounter, sanitized my hands both with  entering and exiting patient's room.    I have discussed plan of care with patient including advance care plan and/or surrogate decision maker.  Patient advises that their wife, Riley will be their primary surrogate decision maker

## 2025-07-30 NOTE — CONSULTS
Date of Consultation: 25    Referral Provider: Pa Tony MD.    Reason for Consultation: Chest pain, coronary artery disease.    Encounter Provider: Dov Chatterjee MD    Group of Service: [facility]     Patient Name: Matt Cruz    :1955    Chief complaint: Chest pain.    History of Present Illness:      This is a very pleasant 69 year-old male who is normally followed by Dr. Alba in our practice.  He has a history of coronary artery disease, prior ischemic cardiomyopathy, hypertension, hyperlipidemia, diabetes, and a previous TIA.    The patient has a significant history of coronary artery disease.  He first presented in  with an NSTEMI, and had 2 drug-eluting stents to the proximal to mid left circumflex at that time.  In May 2024, he was admitted at [place] with chest pain, and was found to have severe three-vessel coronary artery disease.  He subsequently underwent a four-vessel CABG with LIMA to LAD, SVG to OM3, and sequential SVG to PDA and PLV of the left circumflex on 2024 with Dr. Mckay.  His ejection fraction has been as low as 33% in , although it was 61% in , most recently 62% in 2024.    The patient presented after an episode of chest discomfort.  He states that he had a right cataract removed on 2025.  He was doing well and was recovering, although he developed pressure just to the right of his sternum earlier today.  He states it felt like someone was pushing on him, and it did not radiate.  He had some shortness of breath with it as well.  In the emergency department, his EKG did not show ischemic changes.  His troponin was 26 and 25, not consistent with ACS or type II NSTEMI.  He has been admitted for further care.  He is currently chest pain-free.      Past Medical History:   Diagnosis Date    CAD (coronary artery disease)     Carotid stenosis     Cataracts, bilateral     Coronary artery disease     Elevated cholesterol      Essential hypertension     Exertional chest pain     Hyperlipidemia     Hypertension     Kidney stone     Pneumonia 2025    TIA (transient ischemic attack)     Type 2 diabetes mellitus with other specified complication          Past Surgical History:   Procedure Laterality Date    CARDIAC CATHETERIZATION Left 04/16/2018    Procedure: Cardiac Catheterization/Vascular Study;  Surgeon: Zane De La O MD;  [facility];  Service: Cardiovascular    CARDIAC CATHETERIZATION N/A 04/16/2018    Procedure: Stent KD coronary;  Surgeon: Zane De La O MD;  [facility];  Service: Cardiovascular    CARDIAC CATHETERIZATION N/A 09/09/2021    Procedure: Left Heart Cath;  Surgeon: Zane De La O MD;  [facility];  Service: Cardiology;  Laterality: N/A;    CARDIAC CATHETERIZATION N/A 09/09/2021    Procedure: Coronary angiography;  Surgeon: Zane De La O MD;  [facility];  Service: Cardiology;  Laterality: N/A;    CARDIAC CATHETERIZATION N/A 09/09/2021    Procedure: Left ventriculography;  Surgeon: Zane De La O MD;  [facility];  Service: Cardiology;  Laterality: N/A;    CARDIAC CATHETERIZATION N/A 09/09/2021    Procedure: Stent KD coronary;  Surgeon: Zane De La O MD;  [facility];  Service: Cardiology;  Laterality: N/A;    CARDIAC CATHETERIZATION N/A 09/09/2021    Procedure: Percutaneous Coronary Intervention;  Surgeon: Zane De La O MD;  [facility];  Service: Cardiology;  Laterality: N/A;    CARDIAC CATHETERIZATION N/A 10/27/2022    Procedure: Left Heart Cath;  Surgeon: Stalin Green MD;  [facility];  Service: Cardiology;  Laterality: N/A;    CARDIAC CATHETERIZATION N/A 10/27/2022    Procedure: Coronary angiography;  Surgeon: Stalin Green MD;  [facility];  Service: Cardiology;  Laterality: N/A;    CARDIAC CATHETERIZATION N/A 10/27/2022    Procedure: Percutaneous Coronary Intervention;  Surgeon: Stalin Green MD;  Location:  [facility] CATH INVASIVE LOCATION;  Service: Cardiology;  Laterality: N/A;    CORONARY ANGIOPLASTY WITH STENT PLACEMENT      CORONARY ARTERY BYPASS GRAFT      KIDNEY STONE SURGERY           No Known Allergies      No current facility-administered medications on file prior to encounter.     Current Outpatient Medications on File Prior to Encounter   Medication Sig Dispense Refill    albuterol sulfate  (90 Base) MCG/ACT inhaler Inhale 2 puffs Every 4 (Four) Hours As Needed for Wheezing.      atorvastatin (LIPITOR) 80 MG tablet TAKE ONE TABLET BY MOUTH ONCE NIGHTLY (Patient taking differently: Take 1 tablet by mouth Daily.) 90 tablet 2    carvedilol (COREG) 25 MG tablet Take 1 tablet by mouth Every 12 (Twelve) Hours. 180 tablet 2    Cholecalciferol 50 MCG (2000 UT) capsule Take 1 capsule by mouth Daily.      clopidogrel (PLAVIX) 75 MG tablet TAKE ONE TABLET BY MOUTH DAILY (Patient taking differently: Take 1 tablet by mouth Daily.) 90 tablet 3    DULoxetine (CYMBALTA) 30 MG capsule Take 1 capsule by mouth Daily.      empagliflozin (JARDIANCE) 10 MG tablet tablet Take 1 tablet by mouth Daily.      furosemide (LASIX) 20 MG tablet TAKE 1 TABLET BY MOUTH DAILY 90 tablet 3    Lantus SoloStar 100 UNIT/ML injection pen Inject 30 Units under the skin into the appropriate area as directed Daily.      metFORMIN (GLUCOPHAGE) 1000 MG tablet Take 1 tablet by mouth 2 (Two) Times a Day With Meals.      pantoprazole (PROTONIX) 20 MG EC tablet Take 1 tablet by mouth Daily.      potassium chloride 10 MEQ CR tablet TAKE 1 TABLET BY MOUTH TWICE A DAY WITH MEALS 180 tablet 3    pregabalin (LYRICA) 50 MG capsule Take 1 capsule by mouth 3 (Three) Times a Day.      sacubitril-valsartan (Entresto) 24-26 MG tablet Take 1 tablet by mouth Every 12 (Twelve)  Hours. 60 tablet 11    traZODone (DESYREL) 50 MG tablet Take 1 tablet by mouth Every Night.      vitamin B-12 (CYANOCOBALAMIN) 1000 MCG tablet Take 1 tablet by mouth Daily.      Blood Glucose Monitoring Suppl (OneTouch Verio) w/Device kit Use to test blood glucose four times daily 1 kit 0    finasteride (PROSCAR) 5 MG tablet Take 1 tablet by mouth Daily. (Patient not taking: Reported on 2025)      glucose blood (OneTouch Verio) test strip Use to test blood glucose four times daily 100 each 0    Lancets (OneTouch Delica Plus [id number]) misc Use to test blood glucose four times daily 100 each 0    nitroglycerin (NITROSTAT) 0.4 MG SL tablet DISSOLVE 1 TAB UNDER TONGUE FOR CHEST PAIN - IF PAIN REMAINS AFTER 5 MIN, CALL 911 AND REPEAT DOSE. MAX 3 TABS IN 15 MINUTES 25 tablet 1         Social History     Socioeconomic History    Marital status:    Tobacco Use    Smoking status: Former     Types: Cigars, Pipe     Quit date:      Years since quittin.5     Passive exposure: Past (father and brother smoked in the home)    Smokeless tobacco: Never    Tobacco comments:     caffeine- dr efren toth 1-2 daily    Vaping Use    Vaping status: Never Used   Substance and Sexual Activity    Alcohol use: Yes     Alcohol/week: 2.0 standard drinks of alcohol     Types: 2 Cans of beer per week     Comment: beer on occas    Drug use: No    Sexual activity: Defer         Family History   Problem Relation Age of Onset    Coronary artery disease Father 60    Heart attack Brother 48               REVIEW OF SYSTEMS:   Pertinent positives are noted in the HPI above.  Otherwise, all other systems were reviewed, and are negative.     Objective:     Vitals:    25 1230 25 1300 25 1433 25 1519   BP: 108/56 120/59 138/77 151/72   BP Location: Left arm  Left arm Left arm   Patient Position:   Lying Lying   Pulse: 57 55 60 54   Resp:   16 16   Temp:   98.2 °F (36.8 °C) 98.1 °F (36.7 °C)   TempSrc:    "Oral Oral   SpO2: 94% 94%  97%   Weight:       Height:         Body mass index is 33.67 kg/m².  Flowsheet Rows      Flowsheet Row First Filed Value   Admission Height 170.2 cm (67\") Documented at 07/30/2025 1058   Admission Weight 97.5 kg (215 lb) Documented at 07/30/2025 1058             General:    No acute distress, alert and oriented x4, pleasant                   Head:    Normocephalic, atraumatic.   Eyes:          Right eye postoperative from cataracts.   Throat:   No oral lesions, no thrush, oral mucosa moist.    Neck:   Supple, trachea midline.   Lungs:     Clear to auscultation bilaterally     Heart:    Regular rhythm and normal rate.  No murmurs, gallops, or rubs noted.   Abdomen:     Soft, non-tender, non-distended, positive bowel sounds.    Extremities:   Trace pedal edema.   Pulses:   Pulses palpable and equal bilaterally.    Skin:   No bleeding or rash.   Neuro:   Non-focal.  Moves all extremities well.    Psychiatric:   Normal mood and affect.             Lab Review:                Results from last 7 days   Lab Units 07/30/25  1104   SODIUM mmol/L 141   POTASSIUM mmol/L 4.4   CHLORIDE mmol/L 107   CO2 mmol/L 25.4   BUN mg/dL 18.0   CREATININE mg/dL 0.85   GLUCOSE mg/dL 197*   CALCIUM mg/dL 10.0     Results from last 7 days   Lab Units 07/30/25  1208 07/30/25  1104   HSTROP T ng/L 25* 26*     Results from last 7 days   Lab Units 07/30/25  1104   WBC 10*3/mm3 8.63   HEMOGLOBIN g/dL 17.2   HEMATOCRIT % 52.6*   PLATELETS 10*3/mm3 141                       EKG (reviewed by me personally):                Assessment:   1.  Chest pain  2.  Coronary artery disease, status post KD x 2 to the proximal to mid left circumflex in 2021.  Also status post 4 vessel CABG on 5/9/2024 at [facility] by Dr. Mckay (LIMA to LAD, SVG to OM3, and sequential SVG to PDA and PLV of the circumflex).  3.  History of ischemic cardiomyopathy with recovered ejection fraction (EF 33% in 2021, but last noted to be 62% in March 2024)  4.  " Obesity, complicating all aspects of care  5.  Recent right cataract removal on 7/28/2025  6.  Hyperlipidemia  7.  History of previous TIA  8.  Hypertension  9.  Diabetes    Plan:       He had an episode of chest pressure to the right of his sternum earlier today which was similar but less intense than the pain he had before his CABG.  He was short of breath during this episode.  However, his symptoms are resolved, and he has not had symptoms prior to this.  He did just have a right cataract removal on 7/28/2025.    He is EKG was nonischemic, and his troponin was flat and not consistent with ACS or type II NSTEMI.  An echocardiogram has been ordered.  I agree with checking an echo given his history of ischemic cardiomyopathy in the past.  However, his last ejection fraction was 62% in March 2024.  I have recommended proceeding with a Lexiscan Myoview stress test tomorrow morning.  He cannot exercise on a treadmill because of musculoskeletal issues.    In the meantime, he will remain on aspirin, Plavix, Lipitor, Coreg, Jardiance, and Entresto.    Cardiology will continue to follow.  Thank you very much for this consult.    Seb Chatterjee MD

## 2025-07-30 NOTE — PROGRESS NOTES
Clinical Pharmacy Services: Medication History    Matt Cruz is a 69 y.o. male presenting to [facility] for   Chief Complaint   Patient presents with    Chest Pain       He  has a past medical history of CAD (coronary artery disease), Carotid stenosis, Cataracts, bilateral, Coronary artery disease, Elevated cholesterol, Essential hypertension, Exertional chest pain, Hyperlipidemia, Hypertension, Kidney stone, Pneumonia (2025), TIA (transient ischemic attack), and Type 2 diabetes mellitus with other specified complication.    Allergies as of 07/30/2025    (No Known Allergies)       Medication information was obtained from: Patient/Pharmacy    Pharmacy and Phone Number:     Prior to Admission Medications       Prescriptions Last Dose Informant Patient Reported? Taking?    albuterol sulfate  (90 Base) MCG/ACT inhaler  Pharmacy Yes Yes    Inhale 2 puffs Every 4 (Four) Hours As Needed for Wheezing.    atorvastatin (LIPITOR) 80 MG tablet  Pharmacy No Yes    TAKE ONE TABLET BY MOUTH ONCE NIGHTLY    Patient taking differently:  Take 1 tablet by mouth Daily.    carvedilol (COREG) 25 MG tablet  Pharmacy No Yes    Take 1 tablet by mouth Every 12 (Twelve) Hours.    Cholecalciferol 50 MCG (2000 UT) capsule  Self Yes Yes    Take 1 capsule by mouth Daily.    clopidogrel (PLAVIX) 75 MG tablet  Pharmacy No Yes    TAKE ONE TABLET BY MOUTH DAILY    Patient taking differently:  Take 1 tablet by mouth Daily.    DULoxetine (CYMBALTA) 30 MG capsule  Medication Bottle Yes Yes    Take 1 capsule by mouth Daily.    empagliflozin (JARDIANCE) 10 MG tablet tablet  Pharmacy Yes Yes    Take 1 tablet by mouth Daily.    furosemide (LASIX) 20 MG tablet  Pharmacy No Yes    TAKE 1 TABLET BY MOUTH DAILY    Lantus SoloStar 100 UNIT/ML injection pen  Self Yes Yes    Inject 30 Units under the skin into the appropriate area as directed Daily.    metFORMIN (GLUCOPHAGE) 1000 MG tablet   Yes Yes    Take 1 tablet by mouth 2 (Two) Times  a Day With Meals.    pantoprazole (PROTONIX) 20 MG EC tablet  Pharmacy Yes Yes    Take 1 tablet by mouth Daily.    potassium chloride 10 MEQ CR tablet  Pharmacy No Yes    TAKE 1 TABLET BY MOUTH TWICE A DAY WITH MEALS    pregabalin (LYRICA) 50 MG capsule  Pharmacy Yes Yes    Take 1 capsule by mouth 3 (Three) Times a Day.    sacubitril-valsartan (Entresto) 24-26 MG tablet  Pharmacy No Yes    Take 1 tablet by mouth Every 12 (Twelve) Hours.    traZODone (DESYREL) 50 MG tablet  Pharmacy Yes Yes    Take 1 tablet by mouth Every Night.    vitamin B-12 (CYANOCOBALAMIN) 1000 MCG tablet  Self Yes Yes    Take 1 tablet by mouth Daily.    Blood Glucose Monitoring Suppl (OneTouch Verio) w/Device kit   No No    Use to test blood glucose four times daily    finasteride (PROSCAR) 5 MG tablet Not Taking  Yes No    Take 1 tablet by mouth Daily.    Patient not taking:  Reported on 7/30/2025    glucose blood (OneTouch Verio) test strip   No No    Use to test blood glucose four times daily    Lancets (OneTouch Delica Plus [id number]) misc   No No    Use to test blood glucose four times daily    nitroglycerin (NITROSTAT) 0.4 MG SL tablet   No No    DISSOLVE 1 TAB UNDER TONGUE FOR CHEST PAIN - IF PAIN REMAINS AFTER 5 MIN, CALL 911 AND REPEAT DOSE. MAX 3 TABS IN 15 MINUTES              Medication notes:     This medication list is complete to the best of my knowledge as of 7/30/2025    Please call if questions.    Chidi Isbell  Medication History Technician  [phone]    7/30/2025 15:01 EDT

## 2025-07-30 NOTE — ED NOTES
Nursing report ED to floor  Matt Cruz  69 y.o.  male     Matt Cruz is a 69 y.o. male with a medical history of hypertension, hyperlipidemia, diabetes, aortic valve sclerosis, CAD, obesity and ischemic cardiomyopathy who presents to the ED c/o acute right-sided nonradiating chest pressure that began about an hour and 1/2 to 2 hours ago.  It has eased down but is not resolved.  It was associated with some nausea and dyspnea initially though those symptoms have resolved.  Began at rest with no clear exacerbating relieving factors.     HPI :  HPI  Stated Reason for Visit: midsternal CP that radiates to R side of chest x1 hour  History Obtained From: patient    Chief Complaint  Chief Complaint   Patient presents with    Chest Pain       Admitting doctor:   Rajan Tony MD    Admitting diagnosis:   The primary encounter diagnosis was Chest discomfort. A diagnosis of History of CAD (coronary artery disease) was also pertinent to this visit.    Code status:   Current Code Status       Date Active Code Status Order ID Comments User Context       7/30/2025 1322 CPR (Attempt to Resuscitate) [id number]  Caron Melendez APRN ED        Question Answer    Code Status (Patient has no pulse and is not breathing) CPR (Attempt to Resuscitate)    Medical Interventions (Patient has pulse or is breathing) Full Support    Level Of Support Discussed With Patient                    Allergies:   Patient has no known allergies.    Isolation:   No active isolations    Intake and Output  No intake or output data in the 24 hours ending 07/30/25 1324    Weight:       07/30/25  1058   Weight: 97.5 kg (215 lb)       Most recent vitals:   Vitals:    07/30/25 1131 07/30/25 1203 07/30/25 1230 07/30/25 1300   BP: 99/57 110/46 108/56 120/59   BP Location: Left arm  Left arm    Patient Position: Lying      Pulse: 60 57 57 55   Resp: 16      Temp:       TempSrc:       SpO2: 96%  94% 94%   Weight:       Height:           Active LDAs/IV  Access:   Lines, Drains & Airways       Active LDAs       Name Placement date Placement time Site Days    Peripheral IV 07/30/25 1323 20 G Anterior;Distal;Left Forearm 07/30/25  1323  Forearm  less than 1                    Labs (abnormal labs have a star):   Labs Reviewed   COMPREHENSIVE METABOLIC PANEL - Abnormal; Notable for the following components:       Result Value    Glucose 197 (*)     Alkaline Phosphatase 151 (*)     All other components within normal limits    Narrative:     GFR Categories in Chronic Kidney Disease (CKD)              GFR Category          GFR (mL/min/1.73)    Interpretation  G1                    90 or greater        Normal or high (1)  G2                    60-89                Mild decrease (1)  G3a                   45-59                Mild to moderate decrease  G3b                   30-44                Moderate to severe decrease  G4                    15-29                Severe decrease  G5                    14 or less           Kidney failure    (1)In the absence of evidence of kidney disease, neither GFR category G1 or G2 fulfill the criteria for CKD.    eGFR calculation 2021 CKD-EPI creatinine equation, which does not include race as a factor   TROPONIN - Abnormal; Notable for the following components:    HS Troponin T 26 (*)     All other components within normal limits    Narrative:     High Sensitive Troponin T Reference Range:  <14.0 ng/L- Negative Female for AMI  <22.0 ng/L- Negative Male for AMI  >=14 - Abnormal Female indicating possible myocardial injury.  >=22 - Abnormal Male indicating possible myocardial injury.   Clinicians would have to utilize clinical acumen, EKG, Troponin, and serial changes to determine if it is an Acute Myocardial Infarction or myocardial injury due to an underlying chronic condition.        CBC WITH AUTO DIFFERENTIAL - Abnormal; Notable for the following components:    Hematocrit 52.6 (*)     Immature Grans % 0.7 (*)     Immature Grans,  Absolute 0.06 (*)     All other components within normal limits   HIGH SENSITIVITIY TROPONIN T 1HR - Abnormal; Notable for the following components:    HS Troponin T 25 (*)     All other components within normal limits    Narrative:     High Sensitive Troponin T Reference Range:  <14.0 ng/L- Negative Female for AMI  <22.0 ng/L- Negative Male for AMI  >=14 - Abnormal Female indicating possible myocardial injury.  >=22 - Abnormal Male indicating possible myocardial injury.   Clinicians would have to utilize clinical acumen, EKG, Troponin, and serial changes to determine if it is an Acute Myocardial Infarction or myocardial injury due to an underlying chronic condition.        RAINBOW DRAW    Narrative:     The following orders were created for panel order [place] Draw.  Procedure                               Abnormality         Status                     ---------                               -----------         ------                     Green Top (Gel)[[id number]]                                  Final result               Lavender Top[[id number]]                                     Final result               Gold Top - SST[[id number]]                                   Final result               Light Blue Top[[id number]]                                   Final result                 Please view results for these tests on the individual orders.   CBC AND DIFFERENTIAL    Narrative:     The following orders were created for panel order CBC & Differential.  Procedure                               Abnormality         Status                     ---------                               -----------         ------                     CBC Auto Differential[[id number]]        Abnormal            Final result                 Please view results for these tests on the individual orders.   GREEN TOP   LAVENDER TOP   GOLD TOP - SST   LIGHT BLUE TOP       EKG:   ECG 12 Lead ED Triage Standing Order; Chest Pain   Preliminary Result    HEART RATE=62  bpm   RR Lfynrukf=891  ms   HI Xdejyvno=798  ms   P Horizontal Axis=2  deg   P Front Axis=53  deg   QRSD Interval=92  ms   QT Wlfpvbrv=450  ms   YAgH=564  ms   QRS Axis=90  deg   T Wave Axis=53  deg   - BORDERLINE ECG -   Sinus rhythm   Borderline prolonged HI interval   Borderline right axis deviation   Consider anterior infarct   Date and Time of Study:2025 11:03:42          Meds given in ED:   Medications   sodium chloride 0.9 % flush 10 mL (has no administration in time range)   nitroglycerin (NITROSTAT) SL tablet 0.4 mg (0.4 mg Sublingual Given 25 1203)   nitroglycerin (NITROSTAT) SL tablet 0.4 mg (has no administration in time range)   sodium chloride 0.9 % flush 10 mL (has no administration in time range)   sodium chloride 0.9 % flush 10 mL (has no administration in time range)   sodium chloride 0.9 % infusion 40 mL (has no administration in time range)   aspirin chewable tablet 324 mg (324 mg Oral Not Given 25 1323)     And   aspirin EC tablet 81 mg (has no administration in time range)   aspirin tablet 325 mg (325 mg Oral Given 25 1203)       Imaging results:  No radiology results for the last day    Ambulatory status:   - stand by    Social issues:   Social History     Socioeconomic History    Marital status:    Tobacco Use    Smoking status: Former     Types: Cigars, Pipe     Quit date:      Years since quittin.5     Passive exposure: Past (father and brother smoked in the home)    Smokeless tobacco: Never    Tobacco comments:     caffeine- dr efren toth 1-2 daily    Vaping Use    Vaping status: Never Used   Substance and Sexual Activity    Alcohol use: Yes     Alcohol/week: 2.0 standard drinks of alcohol     Types: 2 Cans of beer per week     Comment: beer on occas    Drug use: No    Sexual activity: Defer       Peripheral Neurovascular  Peripheral Neurovascular (Adult)  Peripheral Neurovascular WDL: neurovascular assessment upper, neurovascular  assessment lower  Additional Documentation: Edema (Group)  Edema  Edema: ankle, left, ankle, right  Ankle, Left Edema: 1+ (Trace)  Ankle, Right Edema: 1+ (Trace)  LUE Neurovascular Assessment  Temperature LUE: warm  Color LUE: no discoloration  Sensation LUE: no tenderness, no tingling, no numbness  RUE Neurovascular Assessment  Temperature RUE: warm  Color RUE: no discoloration  Sensation RUE: no numbness, no tenderness, no tingling  LLE Neurovascular Assessment  Temperature LLE: warm  Color LLE: no discoloration  Sensation LLE: no numbness, no tenderness, no tingling  RLE Neurovascular Assessment  Temperature RLE: warm  Color RLE: no discoloration  Sensation RLE: no numbness, no tenderness, no tingling    Neuro Cognitive  Neuro Cognitive (Adult)  Cognitive/Neuro/Behavioral WDL: WDL, orientation  Orientation: oriented x 4    Learning  Learning Assessment  Learning Readiness and Ability: no barriers identified  Education Provided  Person Taught: patient  Teaching Method: verbal instruction  Teaching Focus: symptom/problem overview  Education Outcome Evaluation: eager to learn, acceptance expressed, verbalizes understanding    Respiratory  Respiratory  Airway WDL: WDL  Additional Documentation: Breath Sounds (Group)  Respiratory WDL  Respiratory WDL: rhythm/pattern  Rhythm/Pattern, Respiratory: shortness of breath (excertional worse from baseline)  Breath Sounds  All Lung Fields Breath Sounds: All Fields  All Lung Fields Breath Sounds: Anterior:, Posterior:, equal bilaterally, clear    Abdominal Pain       Pain Assessments  Pain (Adult)  (0-10) Pain Rating: Rest: 2  Pain Location: chest  Pain Side/Orientation: medial  Response to Pain Interventions: interventions effective per patient    NIH Stroke Scale       Mirela Pulido RN  07/30/25 13:24 EDT

## 2025-07-31 ENCOUNTER — APPOINTMENT (OUTPATIENT)
Dept: NUCLEAR MEDICINE | Facility: HOSPITAL | Age: 70
End: 2025-07-31
Payer: COMMERCIAL

## 2025-07-31 ENCOUNTER — APPOINTMENT (OUTPATIENT)
Dept: CARDIOLOGY | Facility: HOSPITAL | Age: 70
End: 2025-07-31
Payer: COMMERCIAL

## 2025-07-31 VITALS
DIASTOLIC BLOOD PRESSURE: 69 MMHG | HEIGHT: 67 IN | SYSTOLIC BLOOD PRESSURE: 145 MMHG | WEIGHT: 215 LBS | OXYGEN SATURATION: 97 % | TEMPERATURE: 97.7 F | HEART RATE: 63 BPM | BODY MASS INDEX: 33.74 KG/M2 | RESPIRATION RATE: 17 BRPM

## 2025-07-31 LAB
ANION GAP SERPL CALCULATED.3IONS-SCNC: 12 MMOL/L (ref 5–15)
AORTIC ARCH: 2.6 CM
AORTIC DIMENSIONLESS INDEX: 0.87 (DI)
ASCENDING AORTA: 3.1 CM
AV MEAN PRESS GRAD SYS DOP V1V2: 2.9 MMHG
AV VMAX SYS DOP: 114.7 CM/SEC
BH CV ECHO MEAS - ACS: 2.02 CM
BH CV ECHO MEAS - AO MAX PG: 5.3 MMHG
BH CV ECHO MEAS - AO ROOT AREA (BSA CORRECTED): 1.7 CM2
BH CV ECHO MEAS - AO ROOT DIAM: 3.5 CM
BH CV ECHO MEAS - AO V2 VTI: 24.9 CM
BH CV ECHO MEAS - AVA(I,D): 2.7 CM2
BH CV ECHO MEAS - EDV(CUBED): 69.5 ML
BH CV ECHO MEAS - EDV(MOD-SP2): 100 ML
BH CV ECHO MEAS - EDV(MOD-SP4): 110 ML
BH CV ECHO MEAS - EF(MOD-SP2): 65 %
BH CV ECHO MEAS - EF(MOD-SP4): 69.1 %
BH CV ECHO MEAS - ESV(CUBED): 17.1 ML
BH CV ECHO MEAS - ESV(MOD-SP2): 35 ML
BH CV ECHO MEAS - ESV(MOD-SP4): 34 ML
BH CV ECHO MEAS - FS: 37.3 %
BH CV ECHO MEAS - IVS/LVPW: 0.97 CM
BH CV ECHO MEAS - IVSD: 0.95 CM
BH CV ECHO MEAS - LAT PEAK E' VEL: 4.9 CM/SEC
BH CV ECHO MEAS - LV DIASTOLIC VOL/BSA (35-75): 52.8 CM2
BH CV ECHO MEAS - LV MASS(C)D: 126.8 GRAMS
BH CV ECHO MEAS - LV MAX PG: 2.9 MMHG
BH CV ECHO MEAS - LV MEAN PG: 1.76 MMHG
BH CV ECHO MEAS - LV SYSTOLIC VOL/BSA (12-30): 16.3 CM2
BH CV ECHO MEAS - LV V1 MAX: 85.7 CM/SEC
BH CV ECHO MEAS - LV V1 VTI: 21.6 CM
BH CV ECHO MEAS - LVIDD: 4.1 CM
BH CV ECHO MEAS - LVIDS: 2.6 CM
BH CV ECHO MEAS - LVOT AREA: 3.1 CM2
BH CV ECHO MEAS - LVOT DIAM: 2 CM
BH CV ECHO MEAS - LVPWD: 0.99 CM
BH CV ECHO MEAS - MED PEAK E' VEL: 4.6 CM/SEC
BH CV ECHO MEAS - MV A DUR: 0.16 SEC
BH CV ECHO MEAS - MV A MAX VEL: 82 CM/SEC
BH CV ECHO MEAS - MV DEC SLOPE: 366.8 CM/SEC2
BH CV ECHO MEAS - MV DEC TIME: 0.29 SEC
BH CV ECHO MEAS - MV E MAX VEL: 71 CM/SEC
BH CV ECHO MEAS - MV E/A: 0.87
BH CV ECHO MEAS - MV MAX PG: 2.9 MMHG
BH CV ECHO MEAS - MV MEAN PG: 0.94 MMHG
BH CV ECHO MEAS - MV P1/2T: 70.8 MSEC
BH CV ECHO MEAS - MV V2 VTI: 31.4 CM
BH CV ECHO MEAS - MVA(P1/2T): 3.1 CM2
BH CV ECHO MEAS - MVA(VTI): 2.16 CM2
BH CV ECHO MEAS - PA ACC TIME: 0.11 SEC
BH CV ECHO MEAS - PA V2 MAX: 73.3 CM/SEC
BH CV ECHO MEAS - RAP SYSTOLE: 3 MMHG
BH CV ECHO MEAS - RV MAX PG: 0.65 MMHG
BH CV ECHO MEAS - RV V1 MAX: 40.3 CM/SEC
BH CV ECHO MEAS - RV V1 VTI: 11 CM
BH CV ECHO MEAS - SUP REN AO DIAM: 1.7 CM
BH CV ECHO MEAS - SV(LVOT): 67.7 ML
BH CV ECHO MEAS - SV(MOD-SP2): 65 ML
BH CV ECHO MEAS - SV(MOD-SP4): 76 ML
BH CV ECHO MEAS - SVI(LVOT): 32.5 ML/M2
BH CV ECHO MEAS - SVI(MOD-SP2): 31.2 ML/M2
BH CV ECHO MEAS - SVI(MOD-SP4): 36.4 ML/M2
BH CV ECHO MEAS - TAPSE (>1.6): 1.43 CM
BH CV ECHO MEASUREMENTS AVERAGE E/E' RATIO: 14.95
BH CV REST NUCLEAR ISOTOPE DOSE: 11.8 MCI
BH CV STRESS COMMENTS STAGE 1: NORMAL
BH CV STRESS DOSE REGADENOSON STAGE 1: 0.4
BH CV STRESS DURATION MIN STAGE 1: 0
BH CV STRESS DURATION SEC STAGE 1: 10
BH CV STRESS NUCLEAR ISOTOPE DOSE: 34 MCI
BH CV STRESS PROTOCOL 1: NORMAL
BH CV STRESS RECOVERY BP: NORMAL MMHG
BH CV STRESS RECOVERY HR: 59 BPM
BH CV STRESS STAGE 1: 1
BUN SERPL-MCNC: 18 MG/DL (ref 8–23)
BUN/CREAT SERPL: 28.6 (ref 7–25)
CALCIUM SPEC-SCNC: 8.8 MG/DL (ref 8.6–10.5)
CHLORIDE SERPL-SCNC: 106 MMOL/L (ref 98–107)
CHOLEST SERPL-MCNC: 106 MG/DL (ref 0–200)
CO2 SERPL-SCNC: 22 MMOL/L (ref 22–29)
CREAT SERPL-MCNC: 0.63 MG/DL (ref 0.76–1.27)
DEPRECATED RDW RBC AUTO: 46.3 FL (ref 37–54)
EGFRCR SERPLBLD CKD-EPI 2021: 103 ML/MIN/1.73
ERYTHROCYTE [DISTWIDTH] IN BLOOD BY AUTOMATED COUNT: 13.6 % (ref 12.3–15.4)
GLUCOSE BLDC GLUCOMTR-MCNC: 114 MG/DL (ref 65–99)
GLUCOSE SERPL-MCNC: 131 MG/DL (ref 65–99)
HCT VFR BLD AUTO: 51 % (ref 37.5–51)
HDLC SERPL-MCNC: 39 MG/DL (ref 40–60)
HGB BLD-MCNC: 16.4 G/DL (ref 13–17.7)
LDLC SERPL CALC-MCNC: 44 MG/DL (ref 0–100)
LDLC/HDLC SERPL: 1.05 {RATIO}
LEFT ATRIUM VOLUME INDEX: 29.4 ML/M2
LV EF BIPLANE MOD: 67.4 %
MAGNESIUM SERPL-MCNC: 2.1 MG/DL (ref 1.6–2.4)
MAXIMAL PREDICTED HEART RATE: 151 BPM
MCH RBC QN AUTO: 30 PG (ref 26.6–33)
MCHC RBC AUTO-ENTMCNC: 32.2 G/DL (ref 31.5–35.7)
MCV RBC AUTO: 93.2 FL (ref 79–97)
PERCENT MAX PREDICTED HR: 38.41 %
PLATELET # BLD AUTO: 129 10*3/MM3 (ref 140–450)
PMV BLD AUTO: 11.7 FL (ref 6–12)
POTASSIUM SERPL-SCNC: 3.5 MMOL/L (ref 3.5–5.2)
QT INTERVAL: 455 MS
QTC INTERVAL: 421 MS
RBC # BLD AUTO: 5.47 10*6/MM3 (ref 4.14–5.8)
SINUS: 3.2 CM
SODIUM SERPL-SCNC: 140 MMOL/L (ref 136–145)
SPECT HRT GATED+EF W RNC IV: 57 %
STJ: 3.3 CM
STRESS BASELINE BP: NORMAL MMHG
STRESS BASELINE HR: 50 BPM
STRESS PERCENT HR: 45 %
STRESS POST PEAK BP: NORMAL MMHG
STRESS POST PEAK HR: 58 BPM
STRESS TARGET HR: 128 BPM
TRIGL SERPL-MCNC: 130 MG/DL (ref 0–150)
VLDLC SERPL-MCNC: 23 MG/DL (ref 5–40)
WBC NRBC COR # BLD AUTO: 8.2 10*3/MM3 (ref 3.4–10.8)

## 2025-07-31 PROCEDURE — 85027 COMPLETE CBC AUTOMATED: CPT | Performed by: NURSE PRACTITIONER

## 2025-07-31 PROCEDURE — 80048 BASIC METABOLIC PNL TOTAL CA: CPT | Performed by: NURSE PRACTITIONER

## 2025-07-31 PROCEDURE — 83735 ASSAY OF MAGNESIUM: CPT | Performed by: NURSE PRACTITIONER

## 2025-07-31 PROCEDURE — 93018 CV STRESS TEST I&R ONLY: CPT | Performed by: INTERNAL MEDICINE

## 2025-07-31 PROCEDURE — 25010000002 REGADENOSON 0.4 MG/5ML SOLUTION: Performed by: EMERGENCY MEDICINE

## 2025-07-31 PROCEDURE — 93005 ELECTROCARDIOGRAM TRACING: CPT | Performed by: NURSE PRACTITIONER

## 2025-07-31 PROCEDURE — 80061 LIPID PANEL: CPT | Performed by: NURSE PRACTITIONER

## 2025-07-31 PROCEDURE — 82948 REAGENT STRIP/BLOOD GLUCOSE: CPT | Performed by: NURSE PRACTITIONER

## 2025-07-31 PROCEDURE — 34310000005 TECHNETIUM TETROFOSMIN KIT: Performed by: EMERGENCY MEDICINE

## 2025-07-31 PROCEDURE — G0378 HOSPITAL OBSERVATION PER HR: HCPCS

## 2025-07-31 PROCEDURE — 78452 HT MUSCLE IMAGE SPECT MULT: CPT

## 2025-07-31 PROCEDURE — 78452 HT MUSCLE IMAGE SPECT MULT: CPT | Performed by: INTERNAL MEDICINE

## 2025-07-31 PROCEDURE — 93010 ELECTROCARDIOGRAM REPORT: CPT | Performed by: INTERNAL MEDICINE

## 2025-07-31 PROCEDURE — A9502 TC99M TETROFOSMIN: HCPCS | Performed by: EMERGENCY MEDICINE

## 2025-07-31 PROCEDURE — 99214 OFFICE O/P EST MOD 30 MIN: CPT | Performed by: INTERNAL MEDICINE

## 2025-07-31 PROCEDURE — 25510000001 PERFLUTREN 6.52 MG/ML SUSPENSION 2 ML VIAL: Performed by: NURSE PRACTITIONER

## 2025-07-31 PROCEDURE — 93306 TTE W/DOPPLER COMPLETE: CPT | Performed by: INTERNAL MEDICINE

## 2025-07-31 PROCEDURE — 93016 CV STRESS TEST SUPVJ ONLY: CPT | Performed by: INTERNAL MEDICINE

## 2025-07-31 PROCEDURE — 93306 TTE W/DOPPLER COMPLETE: CPT

## 2025-07-31 PROCEDURE — 93017 CV STRESS TEST TRACING ONLY: CPT

## 2025-07-31 RX ORDER — REGADENOSON 0.08 MG/ML
0.4 INJECTION, SOLUTION INTRAVENOUS
Status: COMPLETED | OUTPATIENT
Start: 2025-07-31 | End: 2025-07-31

## 2025-07-31 RX ADMIN — CLOPIDOGREL BISULFATE 75 MG: 75 TABLET, FILM COATED ORAL at 13:10

## 2025-07-31 RX ADMIN — POTASSIUM CHLORIDE 10 MEQ: 750 TABLET, EXTENDED RELEASE ORAL at 13:15

## 2025-07-31 RX ADMIN — ATORVASTATIN CALCIUM 80 MG: 80 TABLET, FILM COATED ORAL at 13:10

## 2025-07-31 RX ADMIN — FUROSEMIDE 20 MG: 20 TABLET ORAL at 13:09

## 2025-07-31 RX ADMIN — TETROFOSMIN 1 DOSE: 1.38 INJECTION, POWDER, LYOPHILIZED, FOR SOLUTION INTRAVENOUS at 09:56

## 2025-07-31 RX ADMIN — PREGABALIN 50 MG: 50 CAPSULE ORAL at 13:10

## 2025-07-31 RX ADMIN — ASPIRIN 81 MG: 81 TABLET, COATED ORAL at 13:09

## 2025-07-31 RX ADMIN — PERFLUTREN 2 ML: 6.52 INJECTION, SUSPENSION INTRAVENOUS at 11:38

## 2025-07-31 RX ADMIN — REGADENOSON 0.4 MG: 0.08 INJECTION, SOLUTION INTRAVENOUS at 09:56

## 2025-07-31 RX ADMIN — PANTOPRAZOLE SODIUM 40 MG: 40 TABLET, DELAYED RELEASE ORAL at 05:04

## 2025-07-31 RX ADMIN — TETROFOSMIN 1 DOSE: 1.38 INJECTION, POWDER, LYOPHILIZED, FOR SOLUTION INTRAVENOUS at 07:02

## 2025-07-31 RX ADMIN — PREGABALIN 50 MG: 50 CAPSULE ORAL at 05:04

## 2025-07-31 RX ADMIN — CARVEDILOL 25 MG: 25 TABLET, FILM COATED ORAL at 05:04

## 2025-07-31 NOTE — PROGRESS NOTES
This patient was admitted with atrial fibrillation with rapid ventricular rate yesterday.  He has been seen by cardiology today and yesterday started on digoxin and diltiazem he is converted to normal sinus rhythm.  Never had chest pain or shortness of breath and has been completely asymptomatic here in the observation unit and is ready to go home.  On my exam today he is normal sinus rhythm with normal blood pressure and no respiratory distress.  He did have a stress test which showed no evidence of myocardial ischemia.  Patient's troponins are flat.  He has been seen by cardiology again today.  He was started on Eliquis and will continue the Eliquis at discharge.  Patient also has a beta-blocker.

## 2025-07-31 NOTE — PROGRESS NOTES
This is a 69-year-old male who has multiple comorbidities history of prior to drug-eluting stents in 2021 history of CABG in the distant past with ischemic cardiomyopathy that has resolved on recent echoes.  He had an episode yesterday morning where he had some pressure in the center of his chest.  It lasted 2 to 3 hours.  It was mild it was not severe.  He does not notice any association with deep breathing or exertion or moving.  He has been chest pain-free since it stopped almost 24 hours ago.  Is not worse with eating or drinking had no nausea vomiting or shortness of breath with it.  Currently when I am seeing him in bed 111 he is just returned from his stress test.  And he is pain-free.  He does suffer from chronic back pain and laying back to get the stress test did aggravate his back pain but that is now improved.  Denies any weakness to his arms or legs or any new sensory changes.  Physical exam he is alert and orient x 3 he is an elderly male that looks older than his stated age.  Appears chronically ill and frail  Head exam is normocephalic atraumatic.  He is wearing dark sunglasses over his eyes as he did have recent cataract surgery on July 28, 2025.  Cardiovascular exam regular rate and rhythm  Normal inspection to his chest he has no pain on palpation or deep breathing  Respiratory exam clear to auscultation bilaterally  Abdomen exam his belly is obese soft and nontender  Extremity exam he has intact distal pulses that are equal strong and symmetric  Assessment and plan chest pain.  Had an episode of chest discomfort yesterday the morning that lasted 2 to 3 hours.  Has been chest pain-free since that time.  Patient's EKG does not show any acute injury pattern is similar to previous EKG.  Patient's troponins are flat.  Chest x-ray is unremarkable.  Cardiology has seen and evaluated the patient.  I have reviewed their note.  The plan is to do a stress test on him today which she is just completed and  also an echo.  His pain seems very atypical for any acute emergent condition.  All questions answered at this time.

## 2025-07-31 NOTE — DISCHARGE SUMMARY
ED OBSERVATION PROGRESS/DISCHARGE SUMMARY    Date of Admission: 7/30/2025   LOS: 0 days   PCP: Yelena Jacobson APRN        Hospital Outcome:   Matt Cruz is a 69 y.o. male who was admitted to the ED observation unit due to chest pain.  High-sensitivity troponin was mildly elevated but flat.  EKG was negative for acute ischemic changes.     No acute event overnight.  Cardiology evaluated patient and ordered stress test which shows no evidence of myocardial ischemia and EF 57%..  Echocardiogram demonstrates LVEF 61 there are 65%, normal systolic function, mildly dilated left atrium otherwise no evidence of pericardial fusion.  Patient will be discharged home and to follow-up with his PCP.      ROS:  General: no fevers, chills  Respiratory: no cough, dyspnea  Cardiovascular: no chest pain, palpitations  Abdomen: No abdominal pain, nausea, vomiting, or diarrhea  Neurologic: No focal weakness    Objective   Physical Exam:  I have reviewed the vital signs.  Temp:  [97.7 °F (36.5 °C)-98.2 °F (36.8 °C)] 97.9 °F (36.6 °C)  Heart Rate:  [51-78] 51  Resp:  [16-18] 16  BP: ()/(46-77) 143/69  General Appearance:    Alert, cooperative, no distress  Head:    Normocephalic, atraumatic  Eyes:    Sclerae anicteric  Neck:   Supple, no mass  Lungs: Clear to auscultation bilaterally, respirations unlabored  Heart: Regular rate and rhythm, S1 and S2 normal, no murmur, rub or gallop  Abdomen:  Soft, nontender, bowel sounds active, nondistended  Extremities: No clubbing, cyanosis, or edema to lower extremities  Pulses:  2+ and symmetric in distal lower extremities  Skin: No rashes   Neurologic: Oriented x3, Normal strength to extremities    Results Review:    I have reviewed the labs, radiology results and diagnostic studies.    Results from last 7 days   Lab Units 07/30/25  1104   WBC 10*3/mm3 8.63   HEMOGLOBIN g/dL 17.2   HEMATOCRIT % 52.6*   PLATELETS 10*3/mm3 141     Results from last 7 days   Lab Units 07/30/25  1104    SODIUM mmol/L 141   POTASSIUM mmol/L 4.4   CHLORIDE mmol/L 107   CO2 mmol/L 25.4   BUN mg/dL 18.0   CREATININE mg/dL 0.85   CALCIUM mg/dL 10.0   BILIRUBIN mg/dL 0.3   ALK PHOS U/L 151*   ALT (SGPT) U/L 30   AST (SGOT) U/L 28   GLUCOSE mg/dL 197*     Imaging Results (Last 24 Hours)       Procedure Component Value Units Date/Time    XR Chest 1 View [[id number]] Collected: 07/30/25 1123     Updated: 07/30/25 1128    Narrative:      XR CHEST 1 VW-     Clinical: Chest pain     COMPARISON examination 10/26/2022     FINDINGS: The cardiomediastinal silhouette is stable and the right lung  is clear. Similar to the previous examination there is elevation of the  left hemidiaphragm. Chronic or recurrent left base airspace disease  similar to the previous examination. There could be a small amount of  associated pleural fluid or thickening. No vascular congestion. The  remainder is unremarkable.     This report was finalized on 7/30/2025 11:24 AM by Dr. Mike Dodd M.D on Workstation: [id number]               I have reviewed the medications.     Discharge Medications        Continue These Medications        Instructions Start Date   OneTouch Delica Plus [id number] misc   Use to test blood glucose four times daily      OneTouch Verio Flex System w/Device kit   Use to test blood glucose four times daily             ASK your doctor about these medications        Instructions Start Date   albuterol sulfate  (90 Base) MCG/ACT inhaler  Commonly known as: PROVENTIL HFA;VENTOLIN HFA;PROAIR HFA   2 puffs, Every 4 Hours PRN      atorvastatin 80 MG tablet  Commonly known as: LIPITOR   TAKE ONE TABLET BY MOUTH ONCE NIGHTLY      carvedilol 25 MG tablet  Commonly known as: COREG   25 mg, Oral, Every 12 Hours      Cholecalciferol 50 MCG (2000 UT) capsule   1 capsule, Daily      clopidogrel 75 MG tablet  Commonly known as: PLAVIX   TAKE ONE TABLET BY MOUTH DAILY      DULoxetine 30 MG capsule  Commonly known as: CYMBALTA   30 mg,  Daily      empagliflozin 10 MG tablet tablet  Commonly known as: JARDIANCE   1 tablet, Daily      Entresto 24-26 MG tablet  Generic drug: sacubitril-valsartan   1 tablet, Oral, Every 12 Hours      finasteride 5 MG tablet  Commonly known as: PROSCAR   5 mg, Daily      furosemide 20 MG tablet  Commonly known as: LASIX   20 mg, Oral, Daily      Lantus SoloStar 100 UNIT/ML injection pen  Generic drug: Insulin Glargine   30 Units, Daily      metFORMIN 1000 MG tablet  Commonly known as: GLUCOPHAGE   1,000 mg, 2 Times Daily With Meals      nitroglycerin 0.4 MG SL tablet  Commonly known as: NITROSTAT   DISSOLVE 1 TAB UNDER TONGUE FOR CHEST PAIN - IF PAIN REMAINS AFTER 5 MIN, CALL 911 AND REPEAT DOSE. MAX 3 TABS IN 15 MINUTES      OneTouch Verio test strip  Generic drug: glucose blood   Use to test blood glucose four times daily      pantoprazole 20 MG EC tablet  Commonly known as: PROTONIX   20 mg, Daily      potassium chloride 10 MEQ CR tablet   10 mEq, Oral, 2 Times Daily With Meals      pregabalin 50 MG capsule  Commonly known as: LYRICA   50 mg, 3 Times Daily      traZODone 50 MG tablet  Commonly known as: DESYREL   50 mg, Nightly      vitamin B-12 1000 MCG tablet  Commonly known as: CYANOCOBALAMIN   1,000 mcg, Daily              ---------------------------------------------------------------------------------------------  Assessment & Plan   Assessment/Problem List    Chest pain      Plan:  Chest pain  History of CAD with prior CABG and PCI  Continuous telemetry monitoring  Vital signs per nurse routine  Troponin 26, 25, repeat in the morning  EKG sinus rhythm, no acute ischemia  Chest x-ray negative for acute findings  Consult cardiology  Echocardiogram  Hold beta-blocker for now  Continue statin, Plavix  Lipid panel  N.p.o. after midnight     Cardiomyopathy  Continue Entresto, furosemide     Diabetes  Continue long-acting insulin  Correction scale while inpatient  A1c     GERD  Continue PPI         Disposition:  Home    Follow-up after Discharge: DCP    This note will serve as a discharge summary    ISABELLA Wiseman 07/31/25 03:10 EDT    I have worn appropriate PPE during this patient encounter, sanitized my hands both with entering and exiting patient's room.      30 minutes has been spent by [facility] providers in the care of this patient while under observation status on this date 07/31/25

## 2025-07-31 NOTE — PROGRESS NOTES
LOS: 0 days   Patient Care Team:  Yelena Jacobson APRN as PCP - General (Nurse Practitioner)  Shaji Murguia MD as Consulting Physician (Urology)    Chief Complaint: Follow-up chest pain, coronary artery disease, history of ischemic cardiomyopathy with recovered ejection fraction.    Interval History: No further chest pain or shortness of breath overnight.  No acute events.    Vital Signs:  Temp:  [97.5 °F (36.4 °C)-98.2 °F (36.8 °C)] 97.5 °F (36.4 °C)  Heart Rate:  [51-60] 53  Resp:  [16] 16  BP: (120-151)/(59-77) 145/69  No intake or output data in the 24 hours ending 07/31/25 1247    Physical Exam:   General Appearance:    No acute distress, alert and oriented x4   Lungs:     Clear to auscultation bilaterally     Heart:    Regular rhythm and normal rate.  No murmurs, gallops, or  rubs.   Abdomen:     Soft, nontender, nondistended.    Extremities:   Trace pedal edema     Results Review:    Results from last 7 days   Lab Units 07/31/25  0344   SODIUM mmol/L 140   POTASSIUM mmol/L 3.5   CHLORIDE mmol/L 106   CO2 mmol/L 22.0   BUN mg/dL 18.0   CREATININE mg/dL 0.63*   GLUCOSE mg/dL 131*   CALCIUM mg/dL 8.8     Results from last 7 days   Lab Units 07/30/25  1208 07/30/25  1104   HSTROP T ng/L 25* 26*     Results from last 7 days   Lab Units 07/31/25  0344   WBC 10*3/mm3 8.20   HEMOGLOBIN g/dL 16.4   HEMATOCRIT % 51.0   PLATELETS 10*3/mm3 129*         Results from last 7 days   Lab Units 07/31/25  0344   CHOLESTEROL mg/dL 106     Results from last 7 days   Lab Units 07/31/25  0344   MAGNESIUM mg/dL 2.1     Results from last 7 days   Lab Units 07/31/25  0344   CHOLESTEROL mg/dL 106   TRIGLYCERIDES mg/dL 130   HDL CHOL mg/dL 39*   LDL CHOL mg/dL 44       I reviewed the patient's new clinical results.        Assessment:  1.  Chest pain.  Lexiscan Myoview stress test on 7/31/2025.  2.  Coronary artery disease, status post KD x 2 to the proximal to mid left circumflex in 2021.  Also status post 4 vessel  CABG on 5/9/2024 at [facility] by Dr. Mckay (LIMA to LAD, SVG to OM3, and sequential SVG to PDA and PLV of the circumflex).  3.  History of ischemic cardiomyopathy with recovered ejection fraction (EF 33% in 2021, but last noted to be 62% in March 2024).  EF 60 to 65% by echo on 7/31/2025.  4.  Obesity, complicating all aspects of care  5.  Recent right cataract removal on 7/28/2025  6.  Hyperlipidemia  7.  History of previous TIA  8.  Hypertension  9.  Diabetes    Plan:  -Reviewed echo and stress test from this morning.  There was no ischemia on the stress test.  His ejection fraction was 60 to 65%.    -He has had no further chest pain since yesterday in the morning prior to presentation.    -Okay to discharge on current medications.  He is on DAPT with aspirin and Plavix.  Continue Lipitor 80 mg/day, Jardiance, and Entresto.  He has no evidence of decompensated congestive heart failure.    -I will set him up for follow-up with ISABELLA Mclean, in 2 weeks.  I will have him see Dr. Alba in 4 weeks.    Dov Chatterjee MD  07/31/25  12:47 EDT

## 2025-07-31 NOTE — DISCHARGE INSTRUCTIONS
Return to the emergency department for symptoms recurrence or exacerbation  Follow-up with your PCP as scheduled  Your stress test and echocardiogram negative acute

## 2025-07-31 NOTE — PLAN OF CARE
Goal Outcome Evaluation:      Pt had no complaints overnight. Aox4, vss, SBA to bathroom with cane. NPO since midnight. Cardio consulted. Going for stress test in AM

## 2025-08-01 NOTE — CASE MANAGEMENT/SOCIAL WORK
Case Management Discharge Note      Final Note: Home via private vehicle.         Selected Continued Care - Discharged on 7/31/2025 Admission date: 7/30/2025 - Discharge disposition: Home or Self Care      Destination    No services have been selected for the patient.                Durable Medical Equipment    No services have been selected for the patient.                Dialysis/Infusion    No services have been selected for the patient.                Home Medical Care    No services have been selected for the patient.                Therapy    No services have been selected for the patient.                Community Resources    No services have been selected for the patient.                Community & DME    No services have been selected for the patient.                    Transportation Services  Transportation: Private Transportation  Private: Car    Final Discharge Disposition Code: 01 - home or self-care

## (undated) DEVICE — CATH DIAG IMPULSE FL3.5 5F 100CM

## (undated) DEVICE — PK CATH CARD 40

## (undated) DEVICE — KT MANIFLD CARDIAC

## (undated) DEVICE — CATH DIAG IMPULSE FL3.5 6F 100CM

## (undated) DEVICE — GLIDESHEATH SLENDER STAINLESS STEEL KIT: Brand: GLIDESHEATH SLENDER

## (undated) DEVICE — CATH GUIDE LAUNCHER FR 3.5 6F 100CM STD LT

## (undated) DEVICE — CATH DIAG IMPULSE PIG145 6F 110CM

## (undated) DEVICE — GUIDE CATHETER: Brand: MACH1™

## (undated) DEVICE — TR BAND RADIAL ARTERY COMPRESSION DEVICE: Brand: TR BAND

## (undated) DEVICE — GW EMR FIX EXCHG J STD .035 3MM 260CM

## (undated) DEVICE — CATH GUIDE ZUMA2 EBU 6F 3.5X100CM

## (undated) DEVICE — HI-TORQUE EXTRA S'PORT GUIDE WIRE .014 STRAIGHT TIP 3.0 CM X 190 CM: Brand: HI-TORQUE EXTRA S'PORT

## (undated) DEVICE — DEV INDEFLATOR

## (undated) DEVICE — CATH DIAG IMPULSE FR4 5F 100CM

## (undated) DEVICE — TREK CORONARY DILATATION CATHETER 3.0 MM X 15 MM / RAPID-EXCHANGE: Brand: TREK

## (undated) DEVICE — CATH DIAG IMPULSE FR4 6F 100CM

## (undated) DEVICE — TREK CORONARY DILATATION CATHETER 3.0 MM X 12 MM / RAPID-EXCHANGE: Brand: TREK

## (undated) DEVICE — Device: Brand: ASAHI SION BLUE

## (undated) DEVICE — 6F .070 JL3.5 100CM: Brand: CORDIS

## (undated) DEVICE — CATH DIAG IMPULSE PIG 5F 100CM

## (undated) DEVICE — DEV INDEFLATOR P/N 580289

## (undated) DEVICE — SUTURE REMOVAL TRAY: Brand: MEDLINE INDUSTRIES, INC.

## (undated) DEVICE — GLIDESHEATH BASIC HYDROPHILIC COATED INTRODUCER SHEATH: Brand: GLIDESHEATH